# Patient Record
Sex: FEMALE | Race: BLACK OR AFRICAN AMERICAN | NOT HISPANIC OR LATINO | Employment: OTHER | URBAN - METROPOLITAN AREA
[De-identification: names, ages, dates, MRNs, and addresses within clinical notes are randomized per-mention and may not be internally consistent; named-entity substitution may affect disease eponyms.]

---

## 2017-07-25 ENCOUNTER — OFFICE VISIT (OUTPATIENT)
Dept: OPHTHALMOLOGY | Facility: CLINIC | Age: 77
End: 2017-07-25
Payer: MEDICARE

## 2017-07-25 DIAGNOSIS — H44.521: ICD-10-CM

## 2017-07-25 DIAGNOSIS — E11.3553 STABLE PROLIFERATIVE DIABETIC RETINOPATHY OF BOTH EYES ASSOCIATED WITH TYPE 2 DIABETES MELLITUS: ICD-10-CM

## 2017-07-25 DIAGNOSIS — H10.13 ALLERGIC CONJUNCTIVITIS, BILATERAL: Primary | ICD-10-CM

## 2017-07-25 PROCEDURE — 99999 PR PBB SHADOW E&M-EST. PATIENT-LVL II: CPT | Mod: PBBFAC,,, | Performed by: OPHTHALMOLOGY

## 2017-07-25 PROCEDURE — 92012 INTRM OPH EXAM EST PATIENT: CPT | Mod: S$PBB,,, | Performed by: OPHTHALMOLOGY

## 2017-07-25 PROCEDURE — 99212 OFFICE O/P EST SF 10 MIN: CPT | Mod: PBBFAC,PO | Performed by: OPHTHALMOLOGY

## 2017-07-25 RX ORDER — NEOMYCIN SULFATE, POLYMYXIN B SULFATE AND DEXAMETHASONE 3.5; 10000; 1 MG/ML; [USP'U]/ML; MG/ML
1 SUSPENSION/ DROPS OPHTHALMIC 3 TIMES DAILY
Qty: 5 ML | Refills: 0 | Status: SHIPPED | OUTPATIENT
Start: 2017-07-25 | End: 2017-08-04

## 2017-07-25 NOTE — PROGRESS NOTES
===============================  2017   Stephanie Wilder,   76 y.o. female   Last visit [facility]: :2015   Last visit eye dept. Visit date not found  VA:  Uncorrected distance visual acuity was NLP in the right eye and HM in the left eye.   Not recorded         Not recorded         Not recorded        Chief Complaint   Patient presents with    PPDR     having discharge in both eyes/ mostly in her right eye/  started up 2 weeks ago     Ophthalmic Medications     Artificial Tears and Lubricant Single Agents Start End    polyvinyl alcohol, artificial tears, (LIQUIFILM TEARS) 1.4 % ophthalmic solution      Si drop as needed.    Class: Historical Med         HPI     PPDR    Additional comments: having discharge in both eyes/ mostly in her right   eye/  started up 2 weeks ago           Comments   DM SINCE   PPDR  TRD OS  TRAUMA OD X 50+ YEARS  S/p stk od 3/21/13  Dry eyes  PRP OS 10/30/13  Pain in or around eye, right   Blind hypotensive eye, right       Last edited by Raina Reid on 2017  2:28 PM. 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  od tender - red  rec ou mtx tid x 10 days   od phthisical  Os - trd   pcoil   MR /os -.75 +1.50x180 ni   ===========================

## 2018-05-03 ENCOUNTER — TELEPHONE (OUTPATIENT)
Dept: OPHTHALMOLOGY | Facility: CLINIC | Age: 78
End: 2018-05-03

## 2018-05-03 RX ORDER — NEOMYCIN SULFATE, POLYMYXIN B SULFATE AND DEXAMETHASONE 3.5; 10000; 1 MG/ML; [USP'U]/ML; MG/ML
1 SUSPENSION/ DROPS OPHTHALMIC 3 TIMES DAILY
Qty: 5 ML | Refills: 0 | Status: SHIPPED | OUTPATIENT
Start: 2018-05-03 | End: 2018-05-13

## 2018-05-03 RX ORDER — NEOMYCIN SULFATE, POLYMYXIN B SULFATE AND DEXAMETHASONE 3.5; 10000; 1 MG/ML; [USP'U]/ML; MG/ML
1 SUSPENSION/ DROPS OPHTHALMIC 3 TIMES DAILY
Qty: 5 ML | Refills: 0 | Status: CANCELLED | OUTPATIENT
Start: 2018-05-03 | End: 2018-05-13

## 2018-05-03 RX ORDER — NEOMYCIN SULFATE, POLYMYXIN B SULFATE AND DEXAMETHASONE 3.5; 10000; 1 MG/ML; [USP'U]/ML; MG/ML
1 SUSPENSION/ DROPS OPHTHALMIC 3 TIMES DAILY
Qty: 5 ML | Refills: 0 | Status: SHIPPED | OUTPATIENT
Start: 2018-05-03 | End: 2018-05-03 | Stop reason: SDUPTHER

## 2018-05-03 NOTE — TELEPHONE ENCOUNTER
----- Message from Caridad Bustos sent at 5/3/2018  2:39 PM CDT -----  Contact: Patient  Patient needs to speak to nurse about her dry itchy eye, please call her [phone]. Thank  you      I spoke with Ms. Wilder.  Dr. Agustin will refill maxitrol for her.  I will fax it to her nurse at [phone]

## 2018-05-08 ENCOUNTER — TELEPHONE (OUTPATIENT)
Dept: OPHTHALMOLOGY | Facility: CLINIC | Age: 78
End: 2018-05-08

## 2018-06-29 ENCOUNTER — TELEPHONE (OUTPATIENT)
Dept: CARDIOLOGY | Facility: CLINIC | Age: 78
End: 2018-06-29

## 2018-06-29 ENCOUNTER — OFFICE VISIT (OUTPATIENT)
Dept: CARDIOLOGY | Facility: CLINIC | Age: 78
End: 2018-06-29
Payer: MEDICARE

## 2018-06-29 VITALS
SYSTOLIC BLOOD PRESSURE: 154 MMHG | WEIGHT: 154 LBS | DIASTOLIC BLOOD PRESSURE: 46 MMHG | HEART RATE: 72 BPM | HEIGHT: 66 IN | BODY MASS INDEX: 24.75 KG/M2

## 2018-06-29 DIAGNOSIS — M79.3 LIPODERMATOSCLEROSIS: ICD-10-CM

## 2018-06-29 DIAGNOSIS — I87.2 VENOUS INSUFFICIENCY OF BOTH LOWER EXTREMITIES: Primary | ICD-10-CM

## 2018-06-29 DIAGNOSIS — I95.3 HEMODIALYSIS-ASSOCIATED HYPOTENSION: ICD-10-CM

## 2018-06-29 DIAGNOSIS — R60.0 BILATERAL LEG EDEMA: ICD-10-CM

## 2018-06-29 PROCEDURE — 99204 OFFICE O/P NEW MOD 45 MIN: CPT | Mod: S$PBB,,, | Performed by: INTERNAL MEDICINE

## 2018-06-29 PROCEDURE — 99999 PR PBB SHADOW E&M-EST. PATIENT-LVL V: CPT | Mod: PBBFAC,,, | Performed by: INTERNAL MEDICINE

## 2018-06-29 PROCEDURE — 99215 OFFICE O/P EST HI 40 MIN: CPT | Mod: PBBFAC,PO | Performed by: INTERNAL MEDICINE

## 2018-06-29 RX ORDER — CARVEDILOL 25 MG/1
25 TABLET ORAL 2 TIMES DAILY WITH MEALS
COMMUNITY
End: 2020-06-16 | Stop reason: SDUPTHER

## 2018-06-29 RX ORDER — ACETAMINOPHEN 325 MG/1
650 TABLET ORAL EVERY 8 HOURS
Status: ON HOLD | COMMUNITY
End: 2022-03-18 | Stop reason: HOSPADM

## 2018-06-29 RX ORDER — GABAPENTIN 100 MG/1
100 CAPSULE ORAL NIGHTLY
COMMUNITY

## 2018-06-29 RX ORDER — LISINOPRIL 40 MG/1
40 TABLET ORAL DAILY
COMMUNITY
End: 2022-03-15

## 2018-06-29 RX ORDER — AMLODIPINE BESYLATE 10 MG/1
10 TABLET ORAL DAILY
COMMUNITY
End: 2020-06-16 | Stop reason: SDUPTHER

## 2018-06-29 RX ORDER — HYDROCORTISONE 1 %
CREAM (GRAM) TOPICAL 2 TIMES DAILY
COMMUNITY
End: 2022-03-15 | Stop reason: ALTCHOICE

## 2018-06-29 RX ORDER — ETHYL CHLORIDE 100 %
AEROSOL, SPRAY (ML) TOPICAL
COMMUNITY

## 2018-06-29 RX ORDER — TRAMADOL HYDROCHLORIDE 50 MG/1
50 TABLET ORAL EVERY 12 HOURS PRN
Status: ON HOLD | COMMUNITY
End: 2022-03-18 | Stop reason: SDUPTHER

## 2018-06-29 RX ORDER — LOPERAMIDE HYDROCHLORIDE 2 MG/1
2 CAPSULE ORAL 4 TIMES DAILY PRN
COMMUNITY
End: 2022-03-15

## 2018-06-29 RX ORDER — HYDRALAZINE HYDROCHLORIDE 50 MG/1
50 TABLET, FILM COATED ORAL 3 TIMES DAILY
COMMUNITY
End: 2019-07-11 | Stop reason: SDUPTHER

## 2018-06-29 RX ORDER — NEOMYCIN SULFATE, POLYMYXIN B SULFATE, AND DEXAMETHASONE 3.5; 10000; 1 MG/G; [USP'U]/G; MG/G
OINTMENT OPHTHALMIC EVERY 6 HOURS
COMMUNITY
End: 2022-03-15 | Stop reason: ALTCHOICE

## 2018-06-29 RX ORDER — CETIRIZINE HYDROCHLORIDE 10 MG/1
10 TABLET ORAL DAILY
COMMUNITY

## 2018-06-29 RX ORDER — MIRTAZAPINE 15 MG/1
15 TABLET, ORALLY DISINTEGRATING ORAL NIGHTLY
COMMUNITY

## 2018-06-29 NOTE — LETTER
June 29, 2018      Michael Cervantes MD  [address] [place] [address]           [facility]  [address] [place] [address]  Phone: [phone]  Fax: [phone]          Patient: Stephanie Wilder   MR Number: [id number]   YOB: 1941   Date of Visit: 6/29/2018       Dear Dr. Michael Cervantes:    Thank you for referring Stephanie Wilder to me for evaluation. Attached you will find relevant portions of my assessment and plan of care.    If you have questions, please do not hesitate to call me. I look forward to following Stephanie Wilder along with you.    Sincerely,    Nigel Hector MD    Enclosure  CC:  No Recipients    If you would like to receive this communication electronically, please contact [email or web address] or [phone] to request more information on [employer] access.    For providers and/or their staff who would like to refer a patient to [facility], please contact us through our one-stop-shop provider referral line, [facility] , at [phone].    If you feel you have received this communication in error or would no longer like to receive these types of communications, please e-mail [email or web address]

## 2018-06-29 NOTE — PROGRESS NOTES
Subjective:   Patient ID:  Stephanie Wilder is a 77 y.o. female who presents for cardiac consult of Leg Swelling      HPI  The patient came in today for cardiac consult of Leg Swelling    Referring Provider: Michael Cervantes  Reason for consult: Leg swelling    6/29/18  This is a 77 year old female pt with PMHx HTN, ESRD on HD T,TH, Sat, DM, neuropathy, anemia, dementia, gerd presents for initial CV evaluation of LE edema.     Pt has been having presyncopal feelings during HD. She has been having LE edema x 2 months, gradually getting worse. No SOB, but cannot lay flat, somewhat describes orthopnea.   No chest pain. No palpitations. Has been on HD for 13 years. Can walk to bathroom and back. Uses wheelchair, can't walk well overall due to weakness in legs. PT has syncopal episode 12 years ago since episode she couldn't walk, no stroke or other etiology found for lack of walking.     Patient feels no chest pain, no sob,  no PND, no palpitation, no syncope, no CNS symptoms.    Patient is compliant with medications.    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       Past Surgical History:   Procedure Laterality Date    CATARACT EXTRACTION      MASTECTOMY Right        Social History   Substance Use Topics    Smoking status: Never Smoker    Smokeless tobacco: Never Used    Alcohol use No       Family History   Problem Relation Age of Onset    Diabetes Maternal Aunt     Glaucoma Maternal Aunt     Heart attack Maternal Aunt 80    Diabetes Paternal Aunt     Glaucoma Paternal Aunt     Diabetes Child     Heart attack Brother 79       Patient's Medications   New Prescriptions    No medications on file    Previous Medications    ACETAMINOPHEN (TYLENOL) 325 MG TABLET    Take 325 mg by mouth every 6 (six) hours as needed for Pain.    AMLODIPINE (NORVASC) 10 MG TABLET    Take 10 mg by mouth once daily.    CARVEDILOL (COREG) 25 MG TABLET    Take 25 mg by mouth 2 (two) times daily with meals.    CETIRIZINE (ZYRTEC) 10 MG TABLET    Take 10 mg by mouth once daily.    CLOTRIMAZOLE-BETAMETHASONE (LOTRISONE) CREAM        CRANBERRY FRUIT EXTRACT (CRANBERRY ORAL)    Take by mouth.    DICYCLOMINE (BENTYL) 10 MG CAPSULE    Take 10 mg by mouth 4 (four) times daily before meals and nightly.    DOCUSATE SODIUM (COLACE) 100 MG CAPSULE    Take 100 mg by mouth 2 (two) times daily.    ERYTHROMYCIN (ROMYCIN) OPHTHALMIC OINTMENT    Place into the left eye every evening.    ESCITALOPRAM OXALATE (LEXAPRO) 10 MG TABLET    Take 1 tablet (10 mg total) by mouth once daily. 1 Tablet Oral Every day    ETHYL CHLORIDE 100% 100 % SPRAY    Apply topically as needed.    FERROUS SULFATE 325 MG (65 MG IRON) TAB TABLET    Take 325 mg by mouth daily with breakfast.    GABAPENTIN (NEURONTIN) 100 MG CAPSULE    Take 100 mg by mouth 3 (three) times daily.    GUAIFENESIN (MUCINEX) 600 MG 12 HR TABLET    Take 1,200 mg by mouth 2 (two) times daily.    HYDRALAZINE (APRESOLINE) 50 MG TABLET    Take 50 mg by mouth 3 (three) times daily.    HYDROCORTISONE (PREPARATION H HYDROCORTISONE) 1 % CREAM    Apply topically 2 (two) times daily.    INSULIN ASP PRT-INSULIN ASPART (NOVOLOG MIX 70-30) 100 UNIT/ML (70-30) SOLN    Sliding scale    INSULIN DETEMIR (LEVEMIR) 100 UNIT/ML INJECTION    Inject 12 Units into the skin once daily.    LISINOPRIL (PRINIVIL,ZESTRIL) 40 MG TABLET    Take 40 mg by mouth once daily.    LOPERAMIDE (IMODIUM) 2 MG CAPSULE    Take 2 mg by mouth 4 (four) times daily as needed for Diarrhea.    MIDODRINE (PROAMATINE) 5 MG TAB    Take 2.5 mg by mouth 2 (two) times daily with meals.    MIRTAZAPINE (REMERON) 30 MG TABLET    Take 30 mg by mouth every  evening.    NEOMYCIN-POLYMYXIN-DEXAMETHASONE (DEXACINE) 3.5 MG/G-10,000 UNIT/G-0.1 % OINT    every 6 (six) hours.    OLANZAPINE (ZYPREXA) 5 MG TABLET    Take 5 mg by mouth every evening.    ONDANSETRON (ZOFRAN) 4 MG TABLET    Take 4 mg by mouth every 8 (eight) hours as needed for Nausea.    PANTOPRAZOLE (PROTONIX) 40 MG TABLET    Take 1 tablet (40 mg total) by mouth 2 (two) times daily.    -PROPYLENE GLYCOL, PF, (SYSTANE ULTRA, PF,) 0.4-0.3 % DPET    Apply to eye.    POLYETHYLENE GLYCOL (GLYCOLAX) 17 GRAM PWPK    Take by mouth.    SEVELAMER CARBONATE (RENVELA) 800 MG TAB    Take 800 mg by mouth 3 (three) times daily with meals.    TRAMADOL (ULTRAM) 50 MG TABLET    Take 50 mg by mouth every 6 (six) hours as needed for Pain.   Modified Medications    No medications on file   Discontinued Medications    B CMPLX 4-VIT A0-F-RO-ZINC OX (VITAL-D RX) 1,750-60-1-12.5 UNIT-MG-MG-MG TAB    Take by mouth. 1 Tablet Oral Every day    CINACALCET (SENSIPAR) 30 MG TAB    Take 30 mg by mouth daily with breakfast.    LACTULOSE (CHRONULAC) 10 GRAM/15 ML SOLUTION    Take by mouth daily as needed.    LORATADINE (CLARITIN) 10 MG TABLET    Take 10 mg by mouth once daily.    POLYVINYL ALCOHOL, ARTIFICIAL TEARS, (LIQUIFILM TEARS) 1.4 % OPHTHALMIC SOLUTION    1 drop as needed.    TRAMADOL-ACETAMINOPHEN 37.5-325 MG (ULTRACET) 37.5-325 MG TAB    Take 1 tablet by mouth every 6 (six) hours as needed for Pain. 2 Tablet Oral Every 6 hours prn       Review of Systems   Constitutional: Negative.    HENT: Negative.    Eyes: Negative.    Respiratory: Negative.    Cardiovascular: Positive for leg swelling.   Gastrointestinal: Negative.    Genitourinary: Negative.    Musculoskeletal: Negative.    Skin: Negative.    Neurological: Positive for focal weakness.   Endo/Heme/Allergies: Negative.    Psychiatric/Behavioral: Negative.    All 12 systems otherwise negative.      Wt Readings from Last 3 Encounters:   06/29/18 69.9 kg (154 lb)   06/22/16 69.9  "kg (154 lb)   04/13/16 68.5 kg (151 lb)     Temp Readings from Last 3 Encounters:   06/22/16 98.6 °F (37 °C) (Oral)   04/11/16 98.9 °F (37.2 °C) (Oral)   03/04/16 98.9 °F (37.2 °C) (Axillary)     BP Readings from Last 3 Encounters:   06/29/18 (!) 154/46   06/22/16 (!) 160/80   04/13/16 122/60     Pulse Readings from Last 3 Encounters:   06/29/18 72   06/22/16 76   04/13/16 85       BP (!) 154/46   Pulse 72   Ht 5' 6" (1.676 m)   Wt 69.9 kg (154 lb)   BMI 24.86 kg/m²     Objective:   Physical Exam   Constitutional: She is oriented to person, place, and time. She appears well-developed and well-nourished. No distress.   HENT:   Head: Normocephalic and atraumatic.   Nose: Nose normal.   Mouth/Throat: Oropharynx is clear and moist.   Eyes: Conjunctivae and EOM are normal. No scleral icterus.   Neck: Normal range of motion. Neck supple. No JVD present. No thyromegaly present.   Cardiovascular: Normal rate, regular rhythm, S1 normal and S2 normal.  Exam reveals no gallop, no S3, no S4 and no friction rub.    Murmur heard.   Midsystolic murmur is present with a grade of 2/6  at the upper right sternal border  Pulmonary/Chest: Effort normal and breath sounds normal. No stridor. No respiratory distress. She has no wheezes. She has no rales. She exhibits no tenderness.   Abdominal: Soft. Bowel sounds are normal. She exhibits no distension and no mass. There is no tenderness. There is no rebound.   Genitourinary:   Genitourinary Comments: Deferred   Musculoskeletal: Normal range of motion. She exhibits edema (2+ b/l). She exhibits no tenderness or deformity.   Lymphadenopathy:     She has no cervical adenopathy.   Neurological: She is alert and oriented to person, place, and time. She exhibits normal muscle tone. Coordination normal.   Skin: Skin is warm and dry. Rash (lipodermatosclerosis on B/L LE) noted. She is not diaphoretic. No erythema. No pallor.   Psychiatric: She has a normal mood and affect. Her behavior is " normal. Judgment and thought content normal.   Nursing note and vitals reviewed.      Lab Results   Component Value Date     04/13/2016    K 3.9 04/13/2016     04/13/2016    CO2 30 (H) 04/13/2016    BUN 31 (H) 04/13/2016    CREATININE 5.5 (H) 04/13/2016     (H) 04/13/2016    HGBA1C 6.2 02/27/2016    MG 2.0 02/27/2016    AST 10 04/13/2016    ALT 11 04/13/2016    ALBUMIN 3.1 (L) 04/13/2016    PROT 7.2 04/13/2016    BILITOT 0.3 04/13/2016    WBC 6.81 04/13/2016    HGB 10.7 (L) 04/13/2016    HCT 33.5 (L) 04/13/2016    HCT 38 02/26/2016     (H) 04/13/2016     04/13/2016    INR 1.1 02/26/2016    TSH <0.010 (L) 04/13/2016     (H) 02/29/2016     Assessment:      1. Venous insufficiency of both lower extremities    2. Hemodialysis-associated hypotension    3. Bilateral leg edema    4. Lipodermatosclerosis        Plan:   1. LE edema   - neg LE u/s for DVTs at nursing home per pt  - likely due to chronic venous insufficiency  - rec compression stockings daily  - check 2D echo to r/o valvular disease and CHF  - low salt diet  - if swelling does not resolve will need further workup to evaluate for lymphedema or deep venous obstruction     2. HTN/hypotension with HD  - cont meds per nephrology    3. ESRD  - cont HD  - compression stockings will increase venous return to allow more volume removal with HD    Thank you for allowing me to participate in this patient's care. Please do not hesitate to contact me with any questions or concerns. Consult note has been forwarded to the referral physician.

## 2018-06-29 NOTE — TELEPHONE ENCOUNTER
Spoke with Jodee at NH and advised her that no antibiotics were prescribed by Dr. Hector.  Confirmed this with Dr. Hector.    ----- Message from Marita Alves sent at 6/29/2018 12:20 PM CDT -----  [facility]   Calling concerning the patient paperwork states she is prescribed antibiotics but does not see any. Please call Federica PINEDA today @ [phone]. Thanks, shanthi

## 2018-07-12 ENCOUNTER — CLINICAL SUPPORT (OUTPATIENT)
Dept: CARDIOLOGY | Facility: CLINIC | Age: 78
End: 2018-07-12
Attending: INTERNAL MEDICINE
Payer: MEDICARE

## 2018-07-12 DIAGNOSIS — R60.0 BILATERAL LEG EDEMA: ICD-10-CM

## 2018-07-12 DIAGNOSIS — I87.2 VENOUS INSUFFICIENCY OF BOTH LOWER EXTREMITIES: ICD-10-CM

## 2018-07-12 PROCEDURE — 93306 TTE W/DOPPLER COMPLETE: CPT | Mod: PBBFAC,PO | Performed by: INTERNAL MEDICINE

## 2018-07-13 ENCOUNTER — TELEPHONE (OUTPATIENT)
Dept: CARDIOLOGY | Facility: CLINIC | Age: 78
End: 2018-07-13

## 2018-07-13 LAB
ESTIMATED PA SYSTOLIC PRESSURE: 44.99
RETIRED EF AND QEF - SEE NOTES: 60 (ref 55–65)
TRICUSPID VALVE REGURGITATION: ABNORMAL

## 2018-07-13 NOTE — TELEPHONE ENCOUNTER
----- Message from Nigel Hector MD sent at 7/13/2018  2:25 PM CDT -----  Please call patient regarding normal result of overall heart function with mild valve disease; elevated pressures in right heart which we will monitor and need further workup with pulmonary. If he/she has any questions please let me know or have him/her schedule an appt to see me soon to discuss. Thank you

## 2018-07-13 NOTE — TELEPHONE ENCOUNTER
I called and pt is in a nursing facility. I rev'd her results with her nurse, Federica Nieves LPN. I also gave her the pt's follow up visit date, time and location. If pt needs to be seen sooner please call us back. She agreed .carmen

## 2018-07-20 ENCOUNTER — TELEPHONE (OUTPATIENT)
Dept: CARDIOLOGY | Facility: CLINIC | Age: 78
End: 2018-07-20

## 2018-07-20 NOTE — TELEPHONE ENCOUNTER
Returned call to Federica--spoke with Idalia--states Federica is unavailable-- will give Federica message to return call to [phone].

## 2018-07-20 NOTE — TELEPHONE ENCOUNTER
----- Message from Jean Bryant sent at 7/20/2018  2:53 PM CDT -----  Contact: elder joyner from [facility]   States she's calling rg her US results and the nurse mentioned a Pulm work-up and wanted to follow up on that and can be reached at [phone]//thanks/dbw

## 2018-07-25 ENCOUNTER — TELEPHONE (OUTPATIENT)
Dept: CARDIOLOGY | Facility: CLINIC | Age: 78
End: 2018-07-25

## 2018-07-25 NOTE — TELEPHONE ENCOUNTER
Spoke with Federica--states received results of patient's echo and was told patient needs to be followed up by a pulmonologist--would like to schedule an appointment--appointment has been scheduled--Federica has been made aware of all appointment information

## 2018-07-25 NOTE — TELEPHONE ENCOUNTER
----- Message from Fela Proctor sent at 7/25/2018  9:02 AM CDT -----  Contact: Stephanie Schultz of [place]  She's calling to verify if a referral will be sent for a Pulmonologist, please advise [phone]

## 2018-08-02 ENCOUNTER — LAB VISIT (OUTPATIENT)
Dept: LAB | Facility: HOSPITAL | Age: 78
End: 2018-08-02
Attending: INTERNAL MEDICINE
Payer: MEDICARE

## 2018-08-02 ENCOUNTER — OFFICE VISIT (OUTPATIENT)
Dept: PULMONOLOGY | Facility: CLINIC | Age: 78
End: 2018-08-02
Payer: MEDICARE

## 2018-08-02 VITALS
RESPIRATION RATE: 18 BRPM | OXYGEN SATURATION: 92 % | WEIGHT: 160 LBS | SYSTOLIC BLOOD PRESSURE: 130 MMHG | BODY MASS INDEX: 25.71 KG/M2 | HEART RATE: 69 BPM | DIASTOLIC BLOOD PRESSURE: 60 MMHG | HEIGHT: 66 IN

## 2018-08-02 DIAGNOSIS — I27.29 PULMONARY HYPERTENSION ASSOCIATED WITH END-STAGE RENAL DISEASE (ESRD) ON DIALYSIS: Primary | ICD-10-CM

## 2018-08-02 DIAGNOSIS — I27.29 PULMONARY HYPERTENSION ASSOCIATED WITH END-STAGE RENAL DISEASE (ESRD) ON DIALYSIS: ICD-10-CM

## 2018-08-02 DIAGNOSIS — N18.6 PULMONARY HYPERTENSION ASSOCIATED WITH END-STAGE RENAL DISEASE (ESRD) ON DIALYSIS: Primary | ICD-10-CM

## 2018-08-02 DIAGNOSIS — Z99.2 PULMONARY HYPERTENSION ASSOCIATED WITH END-STAGE RENAL DISEASE (ESRD) ON DIALYSIS: ICD-10-CM

## 2018-08-02 DIAGNOSIS — N18.6 PULMONARY HYPERTENSION ASSOCIATED WITH END-STAGE RENAL DISEASE (ESRD) ON DIALYSIS: ICD-10-CM

## 2018-08-02 DIAGNOSIS — Z99.2 PULMONARY HYPERTENSION ASSOCIATED WITH END-STAGE RENAL DISEASE (ESRD) ON DIALYSIS: Primary | ICD-10-CM

## 2018-08-02 LAB
ALBUMIN SERPL BCP-MCNC: 3.2 G/DL
ALP SERPL-CCNC: 107 U/L
ALT SERPL W/O P-5'-P-CCNC: 9 U/L
ANION GAP SERPL CALC-SCNC: 7 MMOL/L
AST SERPL-CCNC: 11 U/L
BASOPHILS # BLD AUTO: 0.01 K/UL
BASOPHILS NFR BLD: 0.2 %
BILIRUB SERPL-MCNC: 0.5 MG/DL
BNP SERPL-MCNC: 650 PG/ML
BUN SERPL-MCNC: 32 MG/DL
CALCIUM SERPL-MCNC: 9 MG/DL
CHLORIDE SERPL-SCNC: 102 MMOL/L
CO2 SERPL-SCNC: 28 MMOL/L
CREAT SERPL-MCNC: 4.5 MG/DL
CRP SERPL-MCNC: 86.8 MG/L
DIFFERENTIAL METHOD: ABNORMAL
EOSINOPHIL # BLD AUTO: 0.2 K/UL
EOSINOPHIL NFR BLD: 4.9 %
ERYTHROCYTE [DISTWIDTH] IN BLOOD BY AUTOMATED COUNT: 16.3 %
ERYTHROCYTE [SEDIMENTATION RATE] IN BLOOD BY WESTERGREN METHOD: 44 MM/HR
EST. GFR  (AFRICAN AMERICAN): 10.3 ML/MIN/1.73 M^2
EST. GFR  (NON AFRICAN AMERICAN): 9 ML/MIN/1.73 M^2
GLUCOSE SERPL-MCNC: 131 MG/DL
HCT VFR BLD AUTO: 34.7 %
HGB BLD-MCNC: 10.2 G/DL
IMM GRANULOCYTES # BLD AUTO: 0.01 K/UL
IMM GRANULOCYTES NFR BLD AUTO: 0.2 %
LYMPHOCYTES # BLD AUTO: 0.8 K/UL
LYMPHOCYTES NFR BLD: 18.8 %
MCH RBC QN AUTO: 31.9 PG
MCHC RBC AUTO-ENTMCNC: 29.4 G/DL
MCV RBC AUTO: 108 FL
MONOCYTES # BLD AUTO: 0.6 K/UL
MONOCYTES NFR BLD: 13.5 %
NEUTROPHILS # BLD AUTO: 2.8 K/UL
NEUTROPHILS NFR BLD: 62.4 %
NRBC BLD-RTO: 0 /100 WBC
PLATELET # BLD AUTO: 132 K/UL
PMV BLD AUTO: 10 FL
POTASSIUM SERPL-SCNC: 4.2 MMOL/L
PROT SERPL-MCNC: 7.6 G/DL
RBC # BLD AUTO: 3.2 M/UL
SODIUM SERPL-SCNC: 137 MMOL/L
WBC # BLD AUTO: 4.46 K/UL

## 2018-08-02 PROCEDURE — 99213 OFFICE O/P EST LOW 20 MIN: CPT | Mod: PBBFAC | Performed by: INTERNAL MEDICINE

## 2018-08-02 PROCEDURE — 86038 ANTINUCLEAR ANTIBODIES: CPT

## 2018-08-02 PROCEDURE — 99205 OFFICE O/P NEW HI 60 MIN: CPT | Mod: S$PBB,,, | Performed by: INTERNAL MEDICINE

## 2018-08-02 PROCEDURE — 99999 PR PBB SHADOW E&M-EST. PATIENT-LVL III: CPT | Mod: PBBFAC,,, | Performed by: INTERNAL MEDICINE

## 2018-08-02 PROCEDURE — 36415 COLL VENOUS BLD VENIPUNCTURE: CPT

## 2018-08-02 PROCEDURE — 80053 COMPREHEN METABOLIC PANEL: CPT

## 2018-08-02 PROCEDURE — 86255 FLUORESCENT ANTIBODY SCREEN: CPT | Mod: 91

## 2018-08-02 PROCEDURE — 86140 C-REACTIVE PROTEIN: CPT

## 2018-08-02 PROCEDURE — 85651 RBC SED RATE NONAUTOMATED: CPT

## 2018-08-02 PROCEDURE — 83880 ASSAY OF NATRIURETIC PEPTIDE: CPT

## 2018-08-02 PROCEDURE — 85025 COMPLETE CBC W/AUTO DIFF WBC: CPT

## 2018-08-02 NOTE — ASSESSMENT & PLAN NOTE
Results for orders placed or performed in visit on 07/12/18   2D echo with color flow doppler   Result Value Ref Range    EF 60 55 - 65    Est. PA Systolic Pressure 44.99 (A)     Tricuspid Valve Regurgitation MILD      mPAP = 0.61 44.99 (sPAP)  + 2 = 29.45 mmhg    The etiopathogenic mechanisms that have been studied in relatively small studies:    1. Arteriovenous fistula-induced increased cardiac output.   2. Endothelial dysfunction, dysregulation of vascular tone and systemic inflammation.   3. Vasoconstriction and vascular sclerosis due to microbubbles in the dialysis circuit.     I have discussed etiology, clinical course and history, diagnostic evaluation, treatment, complications associated with treatment of pulmonary  hypertension in detail with the patient.  The patient expressed and verbalized understanding.  I have also discussed complications of untreated pulmonary hypertension with the patient who expressed and verbalized understanding.  All questions answered.      FURTHER EVALUATION:  [x]        Complete PFT with bronchodilator.  [x]        V/Q scan.   []        Stress test, pulmonary.  [x]        PULM - Arterial Blood Gases  []        Chest X Ray  [x]        CT scan of chest.   [x]        RANJIT  [x]        Sedimentation rate  [x]        C-reactive protein  [x]        Anti-neutrophilic cytoplasmic antibody  [x]        NT Pro BNP         PLAN:  Re evaluate in four weeks with results.  Continue all current ongoing medical management.

## 2018-08-02 NOTE — PROGRESS NOTES
New patient    Subjective:      Patient ID: Stephanie Wilder is a 77 y.o. female.    Patient Active Problem List   Diagnosis    Diabetes mellitus    Dryness, eye - Both Eyes    Blind hypotensive eye - Right Eye    Tear film insufficiency, unspecified    Numbness of face    Colon cancer screening    Pain in or around eye    End stage renal disease    Hypoglycemia    DM (diabetes mellitus)    Type 2 diabetes mellitus with proliferative diabetic retinopathy without macular edema    Retinal ischemia    Traction detachment of left retina    Upper GI bleed    Hemodialysis-associated hypotension    Bilateral leg edema    Venous insufficiency of both lower extremities    Lipodermatosclerosis    Pulmonary hypertension associated with end-stage renal disease (ESRD) on dialysis       Problem list has been reviewed.    she has been referred by Nigel Hector MD for evaluation and management for   Chief Complaint   Patient presents with    Pulmonary Hypertension       Chief Complaint: Pulmonary Hypertension      HPI:      She has ESRD on HD( for the past 13 years) ; Mon. Wed, Fri @ [facility] on [address]. She has a shunt in her Right Femoral A/V shunt    She reports HD associated hypotension for the past 8 months. She reports recurrent syncope during HD. She also reports progressive pedal edema and orthopnea. She denies shortness of breath. She is non ambulatory due to a possible CVA. She is wheelchair bound.     2D ECHO revealed mild pulmonary HTN. She is her for evaluation for pulmonary HTN    Patient denies cough, difficulty breathing and wheezing. Patient denies recent sick contacts.   Patient does not have new pets. Patient does not have a history of asthma. Patient does not have a history of environmental allergens. { Patient has not traveled recently. Patient does not have a history of smoking. Patient has had a previous chest x-ray.     She reports that she was treated for TB in the past (She is not very  sure)       Previous Report Reviewed: lab reports, office notes and radiology reports     Past Medical History: The following portions of the patient's history were reviewed and updated as appropriate:   She  has a past surgical history that includes Cataract extraction and Mastectomy (Right).  Her family history includes Diabetes in her child, maternal aunt, and paternal aunt; Glaucoma in her maternal aunt and paternal aunt; Heart attack (age of onset: 79) in her brother; Heart attack (age of onset: 80) in her maternal aunt.  She  reports that she has never smoked. She has never used smokeless tobacco. She reports that she does not drink alcohol or use drugs.  She has a current medication list which includes the following prescription(s): acetaminophen, amlodipine, carvedilol, cetirizine, clotrimazole-betamethasone 1-0.05%, cranberry fruit extract, dicyclomine, docusate sodium, erythromycin, escitalopram oxalate, ethyl chloride 100%, ferrous sulfate, gabapentin, guaifenesin, hydralazine, hydrocortisone, insulin asp prt-insulin aspart (novolog 70/30), insulin detemir u-100, lisinopril, loperamide, midodrine, mirtazapine, neomycin-polymyxin-dexamethasone, olanzapine, ondansetron, peg 400-propylene glycol (pf), polyethylene glycol, sevelamer carbonate, tramadol, and pantoprazole.  She has No Known Allergies..    Review of Systems   Constitutional: Positive for fatigue and weakness. Negative for chills and night sweats.   HENT: Negative for nosebleeds, sinus pressure and congestion.         Dentures   Eyes: Negative for itching.        Cataracts   Glaucoma.   Respiratory: Positive for orthopnea. Negative for snoring, cough, wheezing, dyspnea on extertion and Paroxysmal Nocturnal Dyspnea.    Cardiovascular: Positive for leg swelling.   Genitourinary: Positive for difficulty urinating.   Endocrine: Negative for cold intolerance and heat intolerance.    Musculoskeletal: Positive for arthralgias and back pain.   Skin:  "Negative for rash.   Gastrointestinal: Positive for acid reflux. Negative for abdominal pain.        Constipation.   Hematochezia   Neurological: Negative for syncope, light-headedness and headaches.   Hematological: Excessive bruising.   All other systems reviewed and are negative.         Occupational History:    Retired CNA  denies occupational exposure to asbestos, silica and petrochemicals.    Avocational Exposures:  none    Pet Exposures:  none      Objective:     Vitals:    08/02/18 0920   BP: 130/60   Pulse: 69   Resp: 18   SpO2: (!) 92%   Weight: 72.6 kg (160 lb)   Height: 5' 6" (1.676 m)   PainSc: 0-No pain     Body mass index is 25.82 kg/m².    Physical Exam   Constitutional: She is oriented to person, place, and time. She appears well-developed and well-nourished.   HENT:   Head: Normocephalic.   OS Endopthalmitis   Eyes: EOM are normal. Pupils are equal, round, and reactive to light.   Neck: Normal range of motion. Neck supple.   Cardiovascular: Normal rate and regular rhythm.    Loud P2   Pulmonary/Chest: Effort normal. No respiratory distress. She has no wheezes. She has no rales.   Abdominal: Soft. Bowel sounds are normal.   Musculoskeletal: She exhibits edema.   Bilateral LE stasis dermatitis.    Neurological: She is alert and oriented to person, place, and time.   Skin: Skin is warm and dry.   Psychiatric: She has a normal mood and affect.   Nursing note and vitals reviewed.      Personal Diagnostic Review      Results for orders placed during the hospital encounter of 02/26/16   X-Ray Chest 1 View    Narrative Exam: XR CHEST 1 VIEW,    Date:  03/03/16 05:27:00    History: Pneumonia.    Findings: Comparison with March 1 exam.    Left axillary stent and clips are present.  Right chest walls clips are present.    Prominent soft tissue at the left paratracheal location with mass effect probably enlarged thyroid gland, unchanged    Cardiomegaly.    Bilateral suprahilar band like infiltrates, slightly " improved on left.    Impression   Please see above.      Electronically signed by: ARACELIS SANTIAGO MD  Date:     03/03/16  Time:    08:14        Results for orders placed or performed in visit on 07/12/18   2D echo with color flow doppler   Result Value Ref Range    EF 60 55 - 65    Est. PA Systolic Pressure 44.99 (A)     Tricuspid Valve Regurgitation MILD        Assessment /Plan:     Discussed diagnosis, its evaluation, treatment and usual course. All questions answered.    Problem List Items Addressed This Visit        Pulmonary    Pulmonary hypertension associated with end-stage renal disease (ESRD) on dialysis - Primary    Current Assessment & Plan       Results for orders placed or performed in visit on 07/12/18   2D echo with color flow doppler   Result Value Ref Range    EF 60 55 - 65    Est. PA Systolic Pressure 44.99 (A)     Tricuspid Valve Regurgitation MILD      mPAP = 0.61 44.99 (sPAP)  + 2 = 29.45 mmhg    The etiopathogenic mechanisms that have been studied in relatively small studies:    1. Arteriovenous fistula-induced increased cardiac output.   2. Endothelial dysfunction, dysregulation of vascular tone and systemic inflammation.   3. Vasoconstriction and vascular sclerosis due to microbubbles in the dialysis circuit.     I have discussed etiology, clinical course and history, diagnostic evaluation, treatment, complications associated with treatment of pulmonary  hypertension in detail with the patient.  The patient expressed and verbalized understanding.  I have also discussed complications of untreated pulmonary hypertension with the patient who expressed and verbalized understanding.  All questions answered.      FURTHER EVALUATION:  [x]        Complete PFT with bronchodilator.  [x]        V/Q scan.   []        Stress test, pulmonary.  [x]        PULM - Arterial Blood Gases  []        Chest X Ray  [x]        CT scan of chest.   [x]        RANJIT  [x]        Sedimentation rate  [x]        C-reactive  protein  [x]        Anti-neutrophilic cytoplasmic antibody  [x]        NT Pro BNP         PLAN:  Re evaluate in four weeks with results.  Continue all current ongoing medical management.            Relevant Orders    Complete PFT with bronchodilator    PULM - Arterial Blood Gases--in addition to PFT only    Sedimentation rate    Anti-neutrophilic cytoplasmic antibody    RAJNIT    C-reactive protein    Comprehensive metabolic panel    CBC auto differential    Brain natriuretic peptide    CT Chest Without Contrast    NM Lung Ventilation Perfusion Imaging               TIME SPENT WITH PATIENT: Time spent: 60 minutes in face to face  discussion concerning diagnosis, prognosis, review of lab and test results, benefits of treatment as well as management of disease, counseling of patient and coordination of care between various health  care providers . Greater than half the time spent was used for coordination of care and counseling of patient.     Follow-up in about 1 month (around 9/2/2018) for Pulmonary Hypertension.

## 2018-08-02 NOTE — PATIENT INSTRUCTIONS
Pulmonary Hypertension  Pulmonary hypertension is high pressure in the blood vessels that carry blood into the lungs. This strains the lungs and heart and can lead to serious problems.  Systemic hypertension means the pressure is too high in blood vessels throughout the body. A person with pulmonary hypertension may also have systemic hypertension.   What causes pulmonary hypertension?  The cause of pulmonary hypertension is sometimes unknown. But it is most often caused by another health problem. In many cases, controlling this problem can help prevent or control pulmonary hypertension. Some of the most common causes of pulmonary hypertension are:  In children  · Severe lung problems in a   · Lung conditions, such as cystic fibrosis or interstitial lung disease  · Heart disease  · Congenital heart defects  · HIV infection  · Other conditions, such as scleroderma, lupus, or sickle cell disease  In adults  · Lung conditions, such as chronic obstructive pulmonary disease (COPD), advanced bronchitis, cystic fibrosis, or pulmonary fibrosis  · Liver disease  · Blood clots in the lungs  · Left-sided heart failure  · HIV infection  · Sleep apnea  · Other conditions, such as scleroderma, lupus, or sickle cell disease  What are the symptoms of pulmonary hypertension?  Symptoms may come on suddenly. Or, they may come on slowly over time. Symptoms can include:  · Shortness of breath  · Blue lips or fingernails (signs that the body is having trouble getting oxygen)  · Tiring quickly, especially when active  · Fast heartbeat  · Pain in the upper right side of the abdomen  · Swelling in the legs or ankles  · Chest pain or pressure  · Fainting or dizzy spells  How is pulmonary hypertension diagnosed?  Your healthcare provider will examine you and listen to your heart and lungs. Your blood pressure will also be measured. Tests may be done as well. These may include:  · Blood tests. These measure certain body functions.  They also check for problems such as infection.  · A chest X-ray. This takes a picture of the inside of the chest. It can show certain heart and lung problems.  · An electrocardiogram (ECG). This test records the hearts electrical activity.  · An echocardiogram (echo). This test uses sound waves to create a moving picture of the heart.  · Pulmonary function tests. These tests measure breathing and lung capacity.  · Cardiac catheterization. This procedure gives detailed information about the hearts structures. A thin tube (catheter) is put into a blood vessel in the groin or neck and guided into the right side of the heart. Certain blood pressure tests are then done.  How is pulmonary hypertension treated?   Treatment depends on your age, health, and the severity of your symptoms. Any underlying health problems you have will be treated. Treatment may also include:  · Oxygen  · Medicine to lower the pressure in the lung blood vessels  · Medicine to help the body lose excess water  · Medicine to prevent blood clots  · Medicine that help the heart beat stronger, pump more blood and control abnormal heart rhythms  What are the long-term concerns?  Though pulmonary hypertension has no cure, certain treatments may relieve symptoms and slow progression of the disease. In rare and severe cases, a lung transplant may be needed. Your healthcare provider can tell you more about this if needed.  When you should call your healthcare provider  Call your healthcare provider right away if you have any of the following:  · Persistent blueness of lips or fingernails  · Shortness of breath  · Fever of 100.4°F (38.0°C) or higher  · Fainting spells   Date Last Reviewed: 1/1/2017  © 3795-8299 [employer]. [address] [place] [address], [place] [address]. All rights reserved. This information is not intended as a substitute for professional medical care. Always follow your healthcare professional's instructions.

## 2018-08-02 NOTE — LETTER
August 2, 2018      Nigel Hector MD  [address] [facility] [address]           [facility]  [address] [place] [id number]  Phone: [phone]  Fax: [phone]          Patient: Stephanie Wilder   MR Number: [id number]   YOB: 1941   Date of Visit: 8/2/2018       Dear Dr. Nigel Hector:    Thank you for referring Stephanie Wilder to me for evaluation. Attached you will find relevant portions of my assessment and plan of care.    If you have questions, please do not hesitate to call me. I look forward to following Stephanie Wilder along with you.    Sincerely,    Duran Welsh MD    Enclosure  CC:  No Recipients    If you would like to receive this communication electronically, please contact [email or web address] or [phone] to request more information on [employer] access.    For providers and/or their staff who would like to refer a patient to [facility], please contact us through our one-stop-shop provider referral line, [facility] , at [phone].    If you feel you have received this communication in error or would no longer like to receive these types of communications, please e-mail [email or web address]

## 2018-08-03 LAB — ANA SER QL IF: NORMAL

## 2018-08-06 LAB
ANCA AB TITR SER IF: NORMAL TITER
P-ANCA TITR SER IF: NORMAL TITER

## 2018-08-21 RX ORDER — NEOMYCIN SULFATE, POLYMYXIN B SULFATE AND DEXAMETHASONE 3.5; 10000; 1 MG/ML; [USP'U]/ML; MG/ML
1 SUSPENSION/ DROPS OPHTHALMIC 4 TIMES DAILY
Qty: 5 ML | Refills: 2 | Status: SHIPPED | OUTPATIENT
Start: 2018-08-21 | End: 2018-08-21 | Stop reason: SDUPTHER

## 2018-08-21 RX ORDER — NEOMYCIN SULFATE, POLYMYXIN B SULFATE AND DEXAMETHASONE 3.5; 10000; 1 MG/ML; [USP'U]/ML; MG/ML
1 SUSPENSION/ DROPS OPHTHALMIC 4 TIMES DAILY
Qty: 5 ML | Refills: 2 | Status: SHIPPED | OUTPATIENT
Start: 2018-08-21 | End: 2018-08-23 | Stop reason: SDUPTHER

## 2018-08-23 RX ORDER — NEOMYCIN SULFATE, POLYMYXIN B SULFATE AND DEXAMETHASONE 3.5; 10000; 1 MG/ML; [USP'U]/ML; MG/ML
1 SUSPENSION/ DROPS OPHTHALMIC 4 TIMES DAILY
Qty: 5 ML | Refills: 2 | Status: SHIPPED | OUTPATIENT
Start: 2018-08-23 | End: 2022-03-15 | Stop reason: ALTCHOICE

## 2018-08-23 NOTE — PROGRESS NOTES
Assessment /Plan     For exam results, see Encounter Report.    There are no diagnoses linked to this encounter.

## 2018-08-28 ENCOUNTER — PROCEDURE VISIT (OUTPATIENT)
Dept: PULMONOLOGY | Facility: CLINIC | Age: 78
End: 2018-08-28
Payer: MEDICARE

## 2018-08-28 ENCOUNTER — OFFICE VISIT (OUTPATIENT)
Dept: PULMONOLOGY | Facility: CLINIC | Age: 78
End: 2018-08-28
Payer: MEDICARE

## 2018-08-28 ENCOUNTER — HOSPITAL ENCOUNTER (OUTPATIENT)
Dept: RADIOLOGY | Facility: HOSPITAL | Age: 78
Discharge: HOME | End: 2018-08-28
Attending: INTERNAL MEDICINE
Payer: MEDICARE

## 2018-08-28 VITALS
DIASTOLIC BLOOD PRESSURE: 60 MMHG | HEART RATE: 74 BPM | HEIGHT: 66 IN | BODY MASS INDEX: 23.38 KG/M2 | SYSTOLIC BLOOD PRESSURE: 126 MMHG | RESPIRATION RATE: 18 BRPM | WEIGHT: 145.5 LBS | OXYGEN SATURATION: 98 %

## 2018-08-28 DIAGNOSIS — I27.29 PULMONARY HYPERTENSION ASSOCIATED WITH END-STAGE RENAL DISEASE (ESRD) ON DIALYSIS: ICD-10-CM

## 2018-08-28 DIAGNOSIS — N18.6 PULMONARY HYPERTENSION ASSOCIATED WITH END-STAGE RENAL DISEASE (ESRD) ON DIALYSIS: ICD-10-CM

## 2018-08-28 DIAGNOSIS — Z99.2 PULMONARY HYPERTENSION ASSOCIATED WITH END-STAGE RENAL DISEASE (ESRD) ON DIALYSIS: ICD-10-CM

## 2018-08-28 DIAGNOSIS — Z99.2 PULMONARY HYPERTENSION ASSOCIATED WITH END-STAGE RENAL DISEASE (ESRD) ON DIALYSIS: Chronic | ICD-10-CM

## 2018-08-28 DIAGNOSIS — N18.6 PULMONARY HYPERTENSION ASSOCIATED WITH END-STAGE RENAL DISEASE (ESRD) ON DIALYSIS: Chronic | ICD-10-CM

## 2018-08-28 DIAGNOSIS — J98.4 CRLD (CHRONIC RESTRICTIVE LUNG DISEASE): Chronic | ICD-10-CM

## 2018-08-28 DIAGNOSIS — I27.29 PULMONARY HYPERTENSION ASSOCIATED WITH END-STAGE RENAL DISEASE (ESRD) ON DIALYSIS: Chronic | ICD-10-CM

## 2018-08-28 DIAGNOSIS — J84.9 ILD (INTERSTITIAL LUNG DISEASE): Primary | Chronic | ICD-10-CM

## 2018-08-28 LAB
BRPFT: ABNORMAL
DLCO ADJ PRE: 17.07 ML/(MIN*MMHG) (ref 16.15–27.61)
DLCO PRE: 15.11 ML/(MIN*MMHG) (ref 16.15–27.61)
DLCO SINGLE BREATH LLN: 16.15
DLCO SINGLE BREATH PRE REF: 69.1 %
DLCO SINGLE BREATH REF: 21.88
DLCOC SBVA LLN: 2.8
DLCOC SBVA PRE REF: 42.7 %
DLCOC SBVA REF: 4.13
DLCOC SINGLE BREATH LLN: 16.15
DLCOC SINGLE BREATH PRE REF: 78 %
DLCOC SINGLE BREATH REF: 21.88
DLCOVA LLN: 2.8
DLCOVA PRE REF: 37.8 %
DLCOVA PRE: 1.56 ML/(MIN*MMHG*L) (ref 2.8–5.46)
DLCOVA REF: 4.13
DLVAADJ PRE: 1.76 ML/(MIN*MMHG*L) (ref 2.8–5.46)
ERV LLN: 0.6
ERV PRE REF: 46.4 %
ERV PRE: 0.28 L (ref 0.6–0.6)
ERV REF: 0.6
ERVN2 LLN: 0.6
ERVN2 REF: 0.6
FEF 25 75 CHG: 3.1 %
FEF 25 75 LLN: 0.55
FEF 25 75 POST REF: 88.6 %
FEF 25 75 POST: 1.38 L/S (ref 0.55–2.57)
FEF 25 75 PRE REF: 85.9 %
FEF 25 75 PRE: 1.34 L/S (ref 0.55–2.57)
FEF 25 75 REF: 1.56
FET100 CHG: 254.9 %
FET100 POST: 7.81 SEC
FET100 PRE: 2.2 SEC
FEV1 CHG: -10.7 %
FEV1 FVC CHG: 3.1 %
FEV1 FVC LLN: 64
FEV1 FVC POST REF: 118.1 %
FEV1 FVC POST: 91.7 % (ref 64.06–91.22)
FEV1 FVC PRE REF: 114.5 %
FEV1 FVC PRE: 88.91 % (ref 64.06–91.22)
FEV1 FVC REF: 78
FEV1 LLN: 1.29
FEV1 POST REF: 47.2 %
FEV1 POST: 0.9 L (ref 1.29–2.5)
FEV1 PRE REF: 52.8 %
FEV1 PRE: 1 L (ref 1.29–2.5)
FEV1 REF: 1.9
FEV6 LLN: 1.61
FEV6 POST REF: 41.4 %
FEV6 POST: 0.97 L (ref 1.61–3.1)
FEV6 REF: 2.35
FRCN2 LLN: 2.02
FRCN2 REF: 2.84
FRCPL PRE: 2.3 L
FRCPLETH LLN: 2.02
FRCPLETH PREREF: 81.1 %
FRCPLETH REF: 2.84
FVC CHG: -13.4 %
FVC LLN: 1.71
FVC POST REF: 39.5 %
FVC POST: 0.98 L (ref 1.71–3.23)
FVC PRE REF: 45.6 %
FVC PRE: 1.13 L (ref 1.71–3.23)
FVC REF: 2.47
IVC PRE: 1.54 L (ref 1.71–3.23)
IVC SINGLE BREATH LLN: 1.71
IVC SINGLE BREATH PRE REF: 62.4 %
IVC SINGLE BREATH REF: 2.47
MVV LLN: 73
MVV PRE REF: 42.8 %
MVV PRE: 37 L/MIN (ref 73.47–99.41)
MVV REF: 86
PEF CHG: -1.4 %
PEF LLN: 2.62
PEF POST REF: 79 %
PEF POST: 3.76 L/S (ref 2.62–6.9)
PEF PRE REF: 80.1 %
PEF PRE: 3.81 L/S (ref 2.62–6.9)
PEF REF: 4.76
RAW LLN: 3.06
RAW PRE REF: 144.1 %
RAW PRE: 4.41 CMH2O*S/L (ref 3.06–3.06)
RAW REF: 3.06
RV LLN: 1.67
RV PRE REF: 82.3 %
RV PRE: 1.84 L (ref 1.67–2.82)
RV REF: 2.24
RVN2 LLN: 1.67
RVN2 REF: 2.24
RVN2TLCN2 LLN: 35
RVN2TLCN2 REF: 44
RVTLC LLN: 35
RVTLC PRE REF: 139.6 %
RVTLC PRE: 62.07 % (ref 34.87–54.05)
RVTLC REF: 44
TLC LLN: 4.31
TLC PRE REF: 56.1 %
TLC PRE: 2.97 L (ref 4.31–6.29)
TLC REF: 5.3
TLCN2 LLN: 4.31
TLCN2 REF: 5.3
VA PRE: 9.68 L (ref 5.15–5.15)
VA SINGLE BREATH LLN: 5.15
VA SINGLE BREATH PRE REF: 188 %
VA SINGLE BREATH REF: 5.15
VC LLN: 1.71
VC PRE REF: 45.6 %
VC PRE: 1.13 L (ref 1.71–3.23)
VC REF: 2.47
VCMAXN2 LLN: 1.71
VCMAXN2 REF: 2.47
VTGRAWPRE: 2.45 L

## 2018-08-28 PROCEDURE — 78582 LUNG VENTILAT&PERFUS IMAGING: CPT | Mod: TC

## 2018-08-28 PROCEDURE — 94729 DIFFUSING CAPACITY: CPT | Mod: PBBFAC

## 2018-08-28 PROCEDURE — 94060 EVALUATION OF WHEEZING: CPT | Mod: 26,S$PBB,, | Performed by: INTERNAL MEDICINE

## 2018-08-28 PROCEDURE — 94762 N-INVAS EAR/PLS OXIMTRY CONT: CPT | Mod: PBBFAC

## 2018-08-28 PROCEDURE — 99999 PR PBB SHADOW E&M-EST. PATIENT-LVL III: CPT | Mod: PBBFAC,,, | Performed by: INTERNAL MEDICINE

## 2018-08-28 PROCEDURE — A9539 TC99M PENTETATE: HCPCS

## 2018-08-28 PROCEDURE — 94726 PLETHYSMOGRAPHY LUNG VOLUMES: CPT | Mod: PBBFAC

## 2018-08-28 PROCEDURE — 94726 PLETHYSMOGRAPHY LUNG VOLUMES: CPT | Mod: 26,S$PBB,, | Performed by: INTERNAL MEDICINE

## 2018-08-28 PROCEDURE — 36600 WITHDRAWAL OF ARTERIAL BLOOD: CPT | Mod: PBBFAC

## 2018-08-28 PROCEDURE — 99213 OFFICE O/P EST LOW 20 MIN: CPT | Mod: PBBFAC,25 | Performed by: INTERNAL MEDICINE

## 2018-08-28 PROCEDURE — 71250 CT THORAX DX C-: CPT | Mod: TC

## 2018-08-28 PROCEDURE — 94729 DIFFUSING CAPACITY: CPT | Mod: 26,S$PBB,, | Performed by: INTERNAL MEDICINE

## 2018-08-28 PROCEDURE — 99215 OFFICE O/P EST HI 40 MIN: CPT | Mod: 25,S$PBB,, | Performed by: INTERNAL MEDICINE

## 2018-08-28 PROCEDURE — 36600 WITHDRAWAL OF ARTERIAL BLOOD: CPT | Mod: 59,S$PBB,, | Performed by: INTERNAL MEDICINE

## 2018-08-28 PROCEDURE — 94060 EVALUATION OF WHEEZING: CPT | Mod: PBBFAC

## 2018-08-28 PROCEDURE — 82803 BLOOD GASES ANY COMBINATION: CPT | Mod: PBBFAC

## 2018-08-28 NOTE — PROGRESS NOTES
Subjective:      Established patient    Patient ID: Stephanie Wilder is a 77 y.o. female.  Patient Active Problem List   Diagnosis    Diabetes mellitus    Dryness, eye - Both Eyes    Blind hypotensive eye - Right Eye    Tear film insufficiency, unspecified    Numbness of face    Colon cancer screening    Pain in or around eye    End stage renal disease    Hypoglycemia    DM (diabetes mellitus)    Type 2 diabetes mellitus with proliferative diabetic retinopathy without macular edema    Retinal ischemia    Traction detachment of left retina    Upper GI bleed    Hemodialysis-associated hypotension    Bilateral leg edema    Venous insufficiency of both lower extremities    Lipodermatosclerosis    Pulmonary hypertension associated with end-stage renal disease (ESRD) on dialysis    CRLD (chronic restrictive lung disease)    ILD (interstitial lung disease)       Problem list has been reviewed.    Chief Complaint: Pulmonary Hypertension      HPI    PFT, 6 MWT and ABG reviewed with patient who expressed and voiced understanding.   All questions were answered to the patients satisfaction.      Patients reports NYHA II dyspnea    The patient does not have currently have symptoms / an exacerbation.         No recent change in breathing.      A full  review of systems, past , family  and social histories was performed except as mentioned in the note above, these are non contributory to the main issues discussed today.       Previous Report Reviewed: lab reports, office notes and radiology reports     The following portions of the patient's history were reviewed and updated as appropriate: She  has a past medical history of Anemia, Constipation, Dementia, Dementia, Diabetes mellitus, Diabetic retinopathy, Dysphagia, End stage renal disease on dialysis, GERD (gastroesophageal reflux disease), Hypertension, Neuropathy, Pneumonia, and Traction detachment of left retina (9/28/2015).  She  has a past surgical history  that includes Cataract extraction; Mastectomy (Right); ESOPHAGOGASTRODUODENOSCOPY (EGD) (N/A, 2/26/2016); EGD (N/A, 6/4/2014); and COLONOSCOPY (N/A, 6/4/2014).  Her family history includes Diabetes in her child, maternal aunt, and paternal aunt; Glaucoma in her maternal aunt and paternal aunt; Heart attack (age of onset: 79) in her brother; Heart attack (age of onset: 80) in her maternal aunt.  She  reports that  has never smoked. she has never used smokeless tobacco. She reports that she does not drink alcohol or use drugs.  She has a current medication list which includes the following prescription(s): acetaminophen, amlodipine, carvedilol, cetirizine, clotrimazole-betamethasone 1-0.05%, cranberry fruit extract, dicyclomine, docusate sodium, erythromycin, escitalopram oxalate, ethyl chloride 100%, ferrous sulfate, gabapentin, guaifenesin, hydralazine, hydrocortisone, insulin asp prt-insulin aspart (novolog 70/30), insulin detemir u-100, lisinopril, loperamide, midodrine, mirtazapine, neomycin-polymyxin-dexamethasone, neomycin-polymyxin-dexamethasone, olanzapine, ondansetron, peg 400-propylene glycol (pf), polyethylene glycol, sevelamer carbonate, tramadol, and pantoprazole.  She has No Known Allergies..    Review of Systems   Constitutional: Positive for fatigue and weakness. Negative for chills and night sweats.   HENT: Negative for nosebleeds, sinus pressure and congestion.         Dentures   Eyes: Negative for itching.        Cataracts   Glaucoma.   Respiratory: Positive for orthopnea. Negative for snoring, cough, wheezing, dyspnea on extertion and Paroxysmal Nocturnal Dyspnea.    Cardiovascular: Positive for leg swelling.   Genitourinary: Positive for difficulty urinating.   Endocrine: Negative for cold intolerance and heat intolerance.    Musculoskeletal: Positive for arthralgias and back pain.   Skin: Negative for rash.   Gastrointestinal: Positive for acid reflux. Negative for abdominal pain.         "Constipation.   Hematochezia   Neurological: Negative for syncope, light-headedness and headaches.   Hematological: Excessive bruising.   All other systems reviewed and are negative.       Objective:     /60   Pulse 74   Resp 18   Ht 5' 6" (1.676 m)   Wt 66 kg (145 lb 8 oz)   SpO2 98%   BMI 23.48 kg/m²   Body mass index is 23.48 kg/m².     Physical Exam   Constitutional: She is oriented to person, place, and time. She appears well-developed and well-nourished.   HENT:   Head: Normocephalic.   OS Endopthalmitis   Eyes: EOM are normal. Pupils are equal, round, and reactive to light.   Neck: Normal range of motion. Neck supple.   Cardiovascular: Normal rate and regular rhythm.   Loud P2   Pulmonary/Chest: Effort normal. No respiratory distress. She has no wheezes. She has no rales.   Abdominal: Soft. Bowel sounds are normal.   Musculoskeletal: She exhibits edema.   Bilateral LE stasis dermatitis.    Neurological: She is alert and oriented to person, place, and time.   Skin: Skin is warm and dry.   Psychiatric: She has a normal mood and affect.   Nursing note and vitals reviewed.      Personal Diagnostic Review      ABG: pH: 7.9  PaO2: 69 PaCO2 46.0   SaO2 93%  Moderate hypoxemia with elevated A - a gradient.   Chronic (compensated) primary respiratory acidosis    pH < 7.35 and HCO3 < 25, for acute (uncompensated)  pH > 7.38 and HCO3 > 26, for chronic (compensated)    expected pH = 7.41  expected CO2 = 48  expected HCO3- = 27    Pulmonary function tests: FEV1: 1.00  (52.8  % predicted), FVC:  1.13 (45.6 % predicted), FEV1/FVC:  89, T.97 (56.1 % predicted), RV/TLVC: 62 (139.6 % predicted), DLCO: 15.11 (69.1 % predicted)  Normla airflow. Severe restriction. Diffusion capacity is mildly reduced.       CT of chest performed on 18:      1. There is chronic enlarged abnormal appearance to the thyroid gland.  It appears increased in size on today's study persistent heterogeneous masslike appearance and " deviation of the trachea to the right of midline.  2. A few lymph nodes are seen within the mediastinum.  These do not appear significantly changed over the interval.  3. Extensive chronic interstitial disease of the lungs is present.  There is calcification of the pleural surface on the right as well as irregular pleural thickening.  This appears diffusely increased in the right lower lobe on today's examination but no discrete mass is identified.  4. A pattern of diffuse ground glass type increased attenuation with patchy distribution is seen within the lungs.  5. Multiple chronic findings are present.  Incidental note is made of gallstones.       NM Lung Ventilation Perfusion Imaging 08/28/18::   Ventilation-perfusion lung scan demonstrates low probability of pulmonary embolism.  Chronic findings are present.    Assessment / Plan:       Discussed diagnosis, its evaluation, treatment and usual course. All questions answered.    Problem List Items Addressed This Visit        Pulmonary    Pulmonary hypertension associated with end-stage renal disease (ESRD) on dialysis    Current Assessment & Plan     Continue HD Mon Wed & Fri. Aggressive volume control with HD.          Relevant Orders    Pulse oximetry overnight    CRLD (chronic restrictive lung disease)    Current Assessment & Plan     CRLD ROS: Secondary to ILD. taking medications as instructed, no medication side effects noted, no significant ongoing wheezing or shortness of breath, using bronchodilator MDI less than twice a week.   New concerns: None.   Exam: appears well, vitals normal, no respiratory distress, acyanotic, normal RR.   Assessment:  CRLD stable.   Plan: Does not curently require any inhalers. ONSAT on room air. Orders as documented in EMR.         Relevant Orders    Pulse oximetry overnight    ILD (interstitial lung disease) - Primary    Current Assessment & Plan     Etiology unclear. Continue to monitor radiologically.                  TIME  SPENT WITH PATIENT: Time spent: 40 minutes in face to face  discussion concerning diagnosis, prognosis, review of lab and test results, benefits of treatment as well as management of disease, counseling of patient and coordination of care between various health  care providers . Greater than half the time spent was used for coordination of care and counseling of patient.       Follow-up in about 1 month (around 9/28/2018) for Interstitial Lung Disease, Restrictive Lung Disease, Pulmonary Hypertension.

## 2018-08-28 NOTE — PROCEDURES
PHYSICIAN INTERPRETATION:    pH: 7.9  PaO2: 69 PaCO2 46.0   SaO2 93%    Moderate hypoxemia with elevated A - a gradient.   Chronic (compensated) primary respiratory acidosis     pH < 7.35 and HCO3 < 25, for acute (uncompensated)  pH > 7.38 and HCO3 > 26, for chronic (compensated)     expected pH = 7.41  expected CO2 = 48  expected HCO3- = 27

## 2018-08-28 NOTE — PATIENT INSTRUCTIONS
Lung Anatomy  Your lungs take air in to give your body oxygen, which the body needs to work. Your lungs, like all the tissues in your body, are made up of billions of tiny specialized cells. Old lung cells die and are replaced by new, identical lung cells. This natural process helps ensure healthy lungs.    Date Last Reviewed: 11/1/2016  © 2633-1962 [employer]. [address] [place] [address], [place] [address]. All rights reserved. This information is not intended as a substitute for professional medical care. Always follow your healthcare professional's instructions.

## 2018-08-28 NOTE — ASSESSMENT & PLAN NOTE
CRLD ROS: Secondary to ILD. taking medications as instructed, no medication side effects noted, no significant ongoing wheezing or shortness of breath, using bronchodilator MDI less than twice a week.   New concerns: None.   Exam: appears well, vitals normal, no respiratory distress, acyanotic, normal RR.   Assessment:  CRLD stable.   Plan: Does not curently require any inhalers. ONSAT on room air. Orders as documented in EMR.

## 2018-08-29 ENCOUNTER — TELEPHONE (OUTPATIENT)
Dept: PULMONOLOGY | Facility: CLINIC | Age: 78
End: 2018-08-29

## 2018-08-29 LAB
ALLENS TEST: ABNORMAL
DELSYS: ABNORMAL
HCO3 UR-SCNC: 28 MMOL/L (ref 24–28)
MODE: ABNORMAL
PCO2 BLDA: 46 MMHG (ref 35–45)
PH SMN: 7.39 [PH] (ref 7.35–7.45)
PO2 BLDA: 69 MMHG (ref 80–100)
POC BE: 3 MMOL/L
POC SATURATED O2: 93 % (ref 95–100)
SAMPLE: ABNORMAL
SITE: ABNORMAL

## 2018-08-29 NOTE — PROCEDURES
[facility]  [address] * [place] [address]  Telephone: [phone]  Test date: 18 Start: 18 23:47:52 Stephanie Wilder  Doctor: Dr. Welsh End: 18 05:30:00 [id number]  Oximetry: Summary Report  Comments: Room Air  Recording time: 05:42:08 Highest pulse: 74 Highest SpO2: 99%  Excluded samplin:00:00 Lowest pulse: 57 Lowest SpO2: 90%  Total valid samplin:42:08 Mean pulse: 63 Mean SpO2: 94.8%  1 S.D.: 4.2 1 S.D.: 1.6  Time with SpO2<90: 0:00:00, 0.0%  Time with SpO2<80: 0:00:00, 0.0%  Time with SpO2<70: 0:00:00, 0.0%  Time with SpO2<60: 0:00:00, 0.0%  Time with SpO2<88: 0:00:00, 0.0%  Time with SpO2 =>90: 5:42:08, 100.0%  Time with SpO2=>80 & <90: 0:00:00, 0.0%  Time with SpO2=>70 & <80: 0:00:00, 0.0%  Time with SpO2=>60 & <70: 0:00:00, 0.0%  There was no time spent with a saturation less than or equal to 88.  A desaturation event was defined as a decrease of saturation by 4 or more.  No events were excluded due to artifact.  There were 5 desaturation events over 3 minutes duration.  There were 4 desaturation events of less than 3 minutes duration during which:  The mean high was 97.0%. The mean low was 91.5%.  The number of these events that were:  > 0 & <10 seconds: 0 > 0 seconds: 4  =>10 & <20 seconds: 0 =>10 seconds: 4  =>20 & <30 seconds: 0 =>20 seconds: 4  =>30 & <40 seconds: 0 =>30 seconds: 4  =>40 & <50 seconds: 0 =>40 seconds: 4  =>50 & <60 seconds: 0 =>50 seconds: 4  =>60 seconds: 4 =>60 seconds: 4  The mean length of desaturation events that were >=10 sec & <=3 mins was: 108.0 sec.  Desaturation event index (events >=10 sec per sampled hour): 0.7  Desaturation event index (events >= 0 sec per sampled hour): 0.7  © 1794-7172 [employer] Oximetry version Standard OA3051.  Oximeter: [employer] 920M Plus memory, 4 second resolution.      PHYSICIAN INTERPRETATION:    OVERNIGHT OXIMETRY REPORT:    Dictated by: Duran Yancey M.D.  Test date:  18  Dictated on: 2018      Comment: This test was performed on Room Air     A desaturation event was defined as a decrease of saturation by 4 or more.    REPORT SUMMARY  Total recording time: 05:42:08  Excluded samplin:00:00:  Total valid samplin:42:08  High pulse: 74 /min  Low pulse: 57 /min    Mean pulse: 63/min    High SpO2: 99%    Low SpO2: 90%    Mean SpO2  94.8 %  Cumulative time with oxygen saturation less than 88% (TC88): 00:00:00 ( 0/0%)      CLINICAL INTERPRETATION  Results are normal    Medicare Criteria Comments:   Oximetry test results suggest the patient does not fall under Medicare Group 1 Criteria. ( Arterial oxygen saturation at or below 88% for at least 5 minutes taken during sleep)    Details about Medicare Group Criteria coverage can be found at http://www.cms.hhs.gov/manuals/downloads/

## 2018-08-29 NOTE — TELEPHONE ENCOUNTER
OVERNIGHT OXIMETRY REPORT:    Dictated by: Duran Yancey M.D.  Test date: 18  Dictated on: 2018      Comment: This test was performed on Room Air     A desaturation event was defined as a decrease of saturation by 4 or more.    REPORT SUMMARY  Total recording time: 05:42:08  Excluded samplin:00:00:  Total valid samplin:42:08  High pulse: 74 /min  Low pulse: 57 /min    Mean pulse: 63/min    High SpO2: 99%    Low SpO2: 90%    Mean SpO2  94.8 %  Cumulative time with oxygen saturation less than 88% (TC88): 00:00:00 ( 0/0%)      CLINICAL INTERPRETATION  Results are normal    Medicare Criteria Comments:   Oximetry test results suggest the patient does not fall under Medicare Group 1 Criteria. ( Arterial oxygen saturation at or below 88% for at least 5 minutes taken during sleep)      PLAN: Normal overnight oximetry reports. Please inform patient

## 2018-10-02 ENCOUNTER — OFFICE VISIT (OUTPATIENT)
Dept: PULMONOLOGY | Facility: CLINIC | Age: 78
End: 2018-10-02
Payer: MEDICARE

## 2018-10-02 VITALS
OXYGEN SATURATION: 96 % | HEIGHT: 66 IN | RESPIRATION RATE: 18 BRPM | BODY MASS INDEX: 27 KG/M2 | WEIGHT: 168 LBS | HEART RATE: 73 BPM | DIASTOLIC BLOOD PRESSURE: 73 MMHG | SYSTOLIC BLOOD PRESSURE: 140 MMHG

## 2018-10-02 DIAGNOSIS — J98.4 CRLD (CHRONIC RESTRICTIVE LUNG DISEASE): ICD-10-CM

## 2018-10-02 DIAGNOSIS — J84.9 ILD (INTERSTITIAL LUNG DISEASE): ICD-10-CM

## 2018-10-02 DIAGNOSIS — N18.6 PULMONARY HYPERTENSION ASSOCIATED WITH END-STAGE RENAL DISEASE (ESRD) ON DIALYSIS: Primary | ICD-10-CM

## 2018-10-02 DIAGNOSIS — Z99.2 PULMONARY HYPERTENSION ASSOCIATED WITH END-STAGE RENAL DISEASE (ESRD) ON DIALYSIS: Primary | ICD-10-CM

## 2018-10-02 DIAGNOSIS — I27.29 PULMONARY HYPERTENSION ASSOCIATED WITH END-STAGE RENAL DISEASE (ESRD) ON DIALYSIS: Primary | ICD-10-CM

## 2018-10-02 PROCEDURE — 99999 PR PBB SHADOW E&M-EST. PATIENT-LVL III: CPT | Mod: PBBFAC,,, | Performed by: INTERNAL MEDICINE

## 2018-10-02 PROCEDURE — 99213 OFFICE O/P EST LOW 20 MIN: CPT | Mod: PBBFAC | Performed by: INTERNAL MEDICINE

## 2018-10-02 PROCEDURE — 99215 OFFICE O/P EST HI 40 MIN: CPT | Mod: S$PBB,,, | Performed by: INTERNAL MEDICINE

## 2018-10-02 NOTE — PROGRESS NOTES
Subjective:      Established patient    Patient ID: Stephanie Wilder is a 77 y.o. female.  Patient Active Problem List   Diagnosis    Diabetes mellitus    Dryness, eye - Both Eyes    Blind hypotensive eye - Right Eye    Tear film insufficiency, unspecified    Numbness of face    Colon cancer screening    Pain in or around eye    End stage renal disease    Hypoglycemia    DM (diabetes mellitus)    Type 2 diabetes mellitus with proliferative diabetic retinopathy without macular edema    Retinal ischemia    Traction detachment of left retina    Upper GI bleed    Hemodialysis-associated hypotension    Bilateral leg edema    Venous insufficiency of both lower extremities    Lipodermatosclerosis    Pulmonary hypertension associated with end-stage renal disease (ESRD) on dialysis    CRLD (chronic restrictive lung disease)    ILD (interstitial lung disease)       Problem list has been reviewed.    Chief Complaint: Pulmonary Hypertension      HPI    Pulmonary hypertension associated with ESRD on HD. (Not hemodynamically significant.)     ONSAT reviewed with patient who expressed and voiced understanding.   All questions were answered to the patients satisfaction.        Patients reports NYHA II dyspnea    The patient does not have currently have symptoms / an exacerbation.       No recent change in breathing.      A full  review of systems, past , family  and social histories was performed except as mentioned in the note above, these are non contributory to the main issues discussed today.       Previous Report Reviewed: lab reports, office notes and radiology reports     The following portions of the patient's history were reviewed and updated as appropriate: She  has a past medical history of Anemia, Constipation, Dementia, Dementia, Diabetes mellitus, Diabetic retinopathy, Dysphagia, End stage renal disease on dialysis, GERD (gastroesophageal reflux disease), Hypertension, Neuropathy, Pneumonia, and  Traction detachment of left retina (9/28/2015).  She  has a past surgical history that includes Cataract extraction; Mastectomy (Right); ESOPHAGOGASTRODUODENOSCOPY (EGD) (N/A, 2/26/2016); EGD (N/A, 6/4/2014); and COLONOSCOPY (N/A, 6/4/2014).  Her family history includes Diabetes in her child, maternal aunt, and paternal aunt; Glaucoma in her maternal aunt and paternal aunt; Heart attack (age of onset: 79) in her brother; Heart attack (age of onset: 80) in her maternal aunt.  She  reports that  has never smoked. she has never used smokeless tobacco. She reports that she does not drink alcohol or use drugs.  She has a current medication list which includes the following prescription(s): amlodipine, carvedilol, cetirizine, dicyclomine, docusate sodium, hydralazine, hydrocortisone, insulin asp prt-insulin aspart (novolog 70/30), polyethylene glycol, sevelamer carbonate, tramadol, acetaminophen, clotrimazole-betamethasone 1-0.05%, cranberry fruit extract, erythromycin, escitalopram oxalate, ethyl chloride 100%, ferrous sulfate, gabapentin, guaifenesin, insulin detemir u-100, lisinopril, loperamide, midodrine, mirtazapine, neomycin-polymyxin-dexamethasone, neomycin-polymyxin-dexamethasone, olanzapine, ondansetron, pantoprazole, and peg 400-propylene glycol (pf).  She has No Known Allergies..    Review of Systems   Constitutional: Positive for fatigue and weakness. Negative for chills and night sweats.   HENT: Negative for nosebleeds, sinus pressure and congestion.         Dentures   Eyes: Negative for itching.        Cataracts   Glaucoma.   Respiratory: Positive for orthopnea. Negative for snoring, cough, wheezing, dyspnea on extertion and Paroxysmal Nocturnal Dyspnea.    Cardiovascular: Positive for leg swelling.   Genitourinary: Positive for difficulty urinating.   Endocrine: Negative for cold intolerance and heat intolerance.    Musculoskeletal: Positive for arthralgias and back pain.   Skin: Negative for rash.  "  Gastrointestinal: Positive for acid reflux. Negative for abdominal pain.        Constipation.   Hematochezia   Neurological: Negative for syncope, light-headedness and headaches.   Hematological: Excessive bruising.   All other systems reviewed and are negative.       Objective:     BP (!) 140/73   Pulse 73   Resp 18   Ht 5' 6" (1.676 m)   Wt 76.2 kg (168 lb)   SpO2 96%   BMI 27.12 kg/m²   Body mass index is 27.12 kg/m².     Physical Exam   Constitutional: She is oriented to person, place, and time. She appears well-developed and well-nourished.   HENT:   Head: Normocephalic.   OS Endopthalmitis   Eyes: EOM are normal. Pupils are equal, round, and reactive to light.   Neck: Normal range of motion. Neck supple.   Cardiovascular: Normal rate and regular rhythm.   Pulmonary/Chest: Effort normal. No respiratory distress. She has no wheezes. She has no rales.   Abdominal: Soft. Bowel sounds are normal.   Musculoskeletal: She exhibits edema.   Bilateral LE stasis dermatitis.    Neurological: She is alert and oriented to person, place, and time.   Skin: Skin is warm and dry.   Psychiatric: She has a normal mood and affect.   Nursing note and vitals reviewed.      Personal Diagnostic Review    OVERNIGHT OXIMETRY REPORT:    Dictated by: Duran Yancey M.D.  Test date: 18  Dictated on: 2018      Comment: This test was performed on Room Air     A desaturation event was defined as a decrease of saturation by   4 or more.    REPORT SUMMARY  Total recording time: 05:42:08  Excluded samplin:00:00:  Total valid samplin:42:08  High pulse: 74 /min  Low pulse: 57 /min    Mean pulse: 63/min    High SpO2: 99%    Low SpO2: 90%    Mean SpO2  94.8 %  Cumulative time with oxygen saturation less than 88% (TC88):   00:00:00 ( 0/0%)      CLINICAL INTERPRETATION  Results are normal    Medicare Criteria Comments:   Oximetry test results suggest the patient does not fall under   Medicare Group 1 Criteria. ( Arterial " oxygen saturation at or   below 88% for at least 5 minutes taken during sleep)    Assessment / Plan:       Discussed diagnosis, its evaluation, treatment and usual course. All questions answered.    Problem List Items Addressed This Visit        Pulmonary    Pulmonary hypertension associated with end-stage renal disease (ESRD) on dialysis - Primary    Current Assessment & Plan       Results for orders placed or performed in visit on 07/12/18   2D echo with color flow doppler   Result Value Ref Range    EF 60 55 - 65    Est. PA Systolic Pressure 44.99 (A)     Tricuspid Valve Regurgitation MILD      mPAP = 0.61 44.99 (sPAP)  + 2 = 29.45 mmhg    The etiopathogenic mechanisms that have been studied in relatively small studies:    1. Arteriovenous fistula-induced increased cardiac output.   2. Endothelial dysfunction, dysregulation of vascular tone and systemic inflammation.   3. Vasoconstriction and vascular sclerosis due to microbubbles in the dialysis circuit.     I have discussed etiology, clinical course and history, diagnostic evaluation, treatment, complications associated with treatment of pulmonary  hypertension in detail with the patient.  The patient expressed and verbalized understanding.  I have also discussed complications of untreated pulmonary hypertension with the patient who expressed and verbalized understanding.  All questions answered.    Continue HD Mon Wed & Fri. Aggressive volume control with HD.          CRLD (chronic restrictive lung disease)    Current Assessment & Plan     CRLD ROS: Secondary to ILD.  no significant ongoing wheezing or shortness of breath   New concerns: None.   Exam: appears well, vitals normal, no respiratory distress, acyanotic, normal RR.   Assessment:  CRLD stable.   Plan: DENIES ANY DELGADO. Does not curently require any inhalers.          ILD (interstitial lung disease)    Current Assessment & Plan     Etiology unclear. Continue to monitor radiologically.                   TIME SPENT WITH PATIENT: Time spent: 40 minutes in face to face  discussion concerning diagnosis, prognosis, review of lab and test results, benefits of treatment as well as management of disease, counseling of patient and coordination of care between various health  care providers . Greater than half the time spent was used for coordination of care and counseling of patient.       Follow-up if symptoms worsen or fail to improve, for Chronic Respiratory Failure, Interstitial Lung Disease, Pulmonary Hypertension.

## 2018-10-02 NOTE — ASSESSMENT & PLAN NOTE
CRLD ROS: Secondary to ILD.  no significant ongoing wheezing or shortness of breath   New concerns: None.   Exam: appears well, vitals normal, no respiratory distress, acyanotic, normal RR.   Assessment:  CRLD stable.   Plan: DENIES ANY DELGADO. Does not curently require any inhalers.

## 2018-10-02 NOTE — PATIENT INSTRUCTIONS
Lung Anatomy  Your lungs take air in to give your body oxygen, which the body needs to work. Your lungs, like all the tissues in your body, are made up of billions of tiny specialized cells. Old lung cells die and are replaced by new, identical lung cells. This natural process helps ensure healthy lungs.    Date Last Reviewed: 11/1/2016  © 5831-2518 [employer]. [address] [place] [address], [place] [address]. All rights reserved. This information is not intended as a substitute for professional medical care. Always follow your healthcare professional's instructions.

## 2018-10-02 NOTE — ASSESSMENT & PLAN NOTE
Results for orders placed or performed in visit on 07/12/18   2D echo with color flow doppler   Result Value Ref Range    EF 60 55 - 65    Est. PA Systolic Pressure 44.99 (A)     Tricuspid Valve Regurgitation MILD      mPAP = 0.61 44.99 (sPAP)  + 2 = 29.45 mmhg    The etiopathogenic mechanisms that have been studied in relatively small studies:    1. Arteriovenous fistula-induced increased cardiac output.   2. Endothelial dysfunction, dysregulation of vascular tone and systemic inflammation.   3. Vasoconstriction and vascular sclerosis due to microbubbles in the dialysis circuit.     I have discussed etiology, clinical course and history, diagnostic evaluation, treatment, complications associated with treatment of pulmonary  hypertension in detail with the patient.  The patient expressed and verbalized understanding.  I have also discussed complications of untreated pulmonary hypertension with the patient who expressed and verbalized understanding.  All questions answered.    Continue HD Mon Wed & Fri. Aggressive volume control with HD.

## 2018-10-23 ENCOUNTER — CLINICAL SUPPORT (OUTPATIENT)
Dept: CARDIOLOGY | Facility: CLINIC | Age: 78
End: 2018-10-23
Payer: MEDICARE

## 2018-10-23 ENCOUNTER — OFFICE VISIT (OUTPATIENT)
Dept: CARDIOLOGY | Facility: CLINIC | Age: 78
End: 2018-10-23
Payer: MEDICARE

## 2018-10-23 VITALS
SYSTOLIC BLOOD PRESSURE: 146 MMHG | BODY MASS INDEX: 27 KG/M2 | DIASTOLIC BLOOD PRESSURE: 88 MMHG | WEIGHT: 168 LBS | HEART RATE: 80 BPM | HEIGHT: 66 IN

## 2018-10-23 DIAGNOSIS — I95.3 HEMODIALYSIS-ASSOCIATED HYPOTENSION: ICD-10-CM

## 2018-10-23 DIAGNOSIS — I15.1 HYPERTENSION SECONDARY TO OTHER RENAL DISORDERS: Primary | ICD-10-CM

## 2018-10-23 DIAGNOSIS — I15.8 OTHER SECONDARY HYPERTENSION: ICD-10-CM

## 2018-10-23 DIAGNOSIS — R60.0 BILATERAL LEG EDEMA: ICD-10-CM

## 2018-10-23 DIAGNOSIS — M79.3 LIPODERMATOSCLEROSIS: ICD-10-CM

## 2018-10-23 DIAGNOSIS — Z99.2 PULMONARY HYPERTENSION ASSOCIATED WITH END-STAGE RENAL DISEASE (ESRD) ON DIALYSIS: Primary | ICD-10-CM

## 2018-10-23 DIAGNOSIS — I27.29 PULMONARY HYPERTENSION ASSOCIATED WITH END-STAGE RENAL DISEASE (ESRD) ON DIALYSIS: Primary | ICD-10-CM

## 2018-10-23 DIAGNOSIS — E11.21 TYPE 2 DIABETES MELLITUS WITH DIABETIC NEPHROPATHY, UNSPECIFIED WHETHER LONG TERM INSULIN USE: ICD-10-CM

## 2018-10-23 DIAGNOSIS — I87.2 VENOUS INSUFFICIENCY OF BOTH LOWER EXTREMITIES: ICD-10-CM

## 2018-10-23 DIAGNOSIS — J84.9 ILD (INTERSTITIAL LUNG DISEASE): ICD-10-CM

## 2018-10-23 DIAGNOSIS — N18.6 END STAGE RENAL DISEASE: ICD-10-CM

## 2018-10-23 DIAGNOSIS — N18.6 PULMONARY HYPERTENSION ASSOCIATED WITH END-STAGE RENAL DISEASE (ESRD) ON DIALYSIS: Primary | ICD-10-CM

## 2018-10-23 PROCEDURE — 93005 ELECTROCARDIOGRAM TRACING: CPT | Mod: PBBFAC | Performed by: INTERNAL MEDICINE

## 2018-10-23 PROCEDURE — 99213 OFFICE O/P EST LOW 20 MIN: CPT | Mod: PBBFAC | Performed by: INTERNAL MEDICINE

## 2018-10-23 PROCEDURE — 99999 PR PBB SHADOW E&M-EST. PATIENT-LVL III: CPT | Mod: PBBFAC,,, | Performed by: INTERNAL MEDICINE

## 2018-10-23 PROCEDURE — 99215 OFFICE O/P EST HI 40 MIN: CPT | Mod: S$PBB,,, | Performed by: INTERNAL MEDICINE

## 2018-10-23 PROCEDURE — 93010 ELECTROCARDIOGRAM REPORT: CPT | Mod: S$PBB,,, | Performed by: INTERNAL MEDICINE

## 2018-10-23 RX ORDER — DOXYCYCLINE 100 MG/1
100 CAPSULE ORAL DAILY
Status: ON HOLD | COMMUNITY
End: 2022-03-18 | Stop reason: HOSPADM

## 2018-10-23 NOTE — PROGRESS NOTES
Subjective:   Patient ID:  Stephanie Wilder is a 77 y.o. female who presents for cardiac consult of Congestive Heart Failure (3 mth f/u)      HPI  The patient came in today for cardiac consult of Congestive Heart Failure (3 mth f/u)    Referring Provider: Michael Cervantes  Reason for consult: Leg swelling    This is a 77 year old female pt with current medical conditions HTN, venous insufficiency, ESRD on HD T,TH, Sat, DM, neuropathy, anemia, dementia, gerd presents for follow up CV evaluation of LE edema.     6/29/18  Pt has been having presyncopal feelings during HD. She has been having LE edema x 2 months, gradually getting worse. No SOB, but cannot lay flat, somewhat describes orthopnea.   No chest pain. No palpitations. Has been on HD for 13 years. Can walk to bathroom and back. Uses wheelchair, can't walk well overall due to weakness in legs. PT has syncopal episode 12 years ago since episode she couldn't walk, no stroke or other etiology found for lack of walking.     10/23/18  Pt had 2D ECHO with normal Bi V function, Pulm HTN, mild TR. She has LE cellulitis on PO abx, has not cleared for about 2-3 months now, no fevers/chills. Compression stockings have been helpful.   ECG - NSR, RBBB, 1st deg AVB, septal infarct - old    Patient feels no chest pain, no sob,  no PND, no palpitation, no syncope, no CNS symptoms.    Patient is compliant with medications.    2D ECHO  CONCLUSIONS     1 - Concentric hypertrophy.     2 - No wall motion abnormalities.     3 - Normal left ventricular systolic function (EF 60-65%).     4 - Indeterminate LV diastolic function.     5 - Normal right ventricular systolic function .     6 - Pulmonary hypertension. The estimated PA systolic pressure is 45 mmHg.     7 - Mild tricuspid regurgitation.     Past Medical History:   Diagnosis Date    Anemia     Constipation     Dementia     Dementia     Diabetes mellitus     Diabetic retinopathy     Dysphagia     End stage renal disease on  dialysis     Tu, Th, Sat    GERD (gastroesophageal reflux disease)     Hypertension     Neuropathy     Pneumonia     Traction detachment of left retina 9/28/2015       Past Surgical History:   Procedure Laterality Date    CATARACT EXTRACTION      COLONOSCOPY N/A 6/4/2014    [facility]    EGD N/A 6/4/2014    Per[facility] by [facility] ENDO    ESOPHAGOGASTRODUODENOSCOPY (EGD) N/A 2/26/2016    Performed by [facility]ren Manning MD at [facility] END[facility]    MASTECTOMY Right        Social History     Tobacco Use    Smoking status: Never Smoker    Smokeless tobacco: Never Used   Substance Use Topics    Alcohol use: No    Drug use: No       Family History   Problem Relation Age of Onset    Diabetes Maternal Aunt     Glaucoma Maternal Aunt     Heart attack Maternal Aunt 80    Diabetes Paternal Aunt     Glaucoma Paternal Aunt     Diabetes Child     Heart attack Brother 79          Medication List           Accurate as of 10/23/18  3:24 PM. If you have any questions, ask your nurse or doctor.               CONTINUE taking these medications    acetaminophen 325 MG tablet  Commonly known as:  TYLENOL     amLODIPine 10 MG tablet  Commonly known as:  NORVASC     carvedilol 25 MG tablet  Commonly known as:  COREG     cetirizine 10 MG tablet  Commonly known as:  ZYRTEC     CRANBERRY ORAL     dicyclomine 10 MG capsule  Commonly known as:  BENTYL     docusate sodium 100 MG capsule  Commonly known as:  COLACE     doxycycline 100 MG Cap  Commonly known as:  VIBRAMYCIN     erythromycin ophthalmic ointment  Commonly known as:  ROMYCIN  Place into the left eye every evening.     escitalopram oxalate 10 MG tablet  Commonly known as:  LEXAPRO  Take 1 tablet (10 mg total) by mouth once daily. 1 Tablet Oral Every day     ethyl chloride 100% 100 % spray     ferrous sulfate 325 mg (65 mg iron) Tab tablet  Commonly known as:  FEOSOL     gabapentin 100 MG capsule  Commonly known  as:  NEURONTIN     guaiFENesin 600 mg 12 hr tablet  Commonly known as:  MUCINEX     hydrALAZINE 50 MG tablet  Commonly known as:  APRESOLINE     insulin detemir U-100 100 unit/mL injection  Commonly known as:  LEVEMIR     lisinopril 40 MG tablet  Commonly known as:  PRINIVIL,ZESTRIL     loperamide 2 mg capsule  Commonly known as:  IMODIUM     LOTRISONE cream  Generic drug:  clotrimazole-betamethasone 1-0.05%     midodrine 5 MG Tab  Commonly known as:  PROAMATINE     mirtazapine 30 MG tablet  Commonly known as:  REMERON     * neomycin-polymyxin-dexamethasone 3.5 mg/g-10,000 unit/g-0.1 % Oint  Commonly known as:  DEXACINE     * neomycin-polymyxin-dexamethasone 3.5mg/mL-10,000 unit/mL-0.1 % Drps  Commonly known as:  MAXITROL  Place 1 drop into the right eye 4 (four) times daily. USE 1 DROP 4 TIMES A DAY FOR 1 WEEK INTO THE AFFECTED EYE     NovoLOG Mix 70-30 U-100 Insuln 100 unit/mL (70-30) Soln  Generic drug:  insulin asp prt-insulin aspart (NOVOLOG 70/30)     OLANZapine 5 MG tablet  Commonly known as:  ZyPREXA     ondansetron 4 MG tablet  Commonly known as:  ZOFRAN     pantoprazole 40 MG tablet  Commonly known as:  PROTONIX  Take 1 tablet (40 mg total) by mouth 2 (two) times daily.     polyethylene glycol 17 gram Pwpk  Commonly known as:  GLYCOLAX     PREPARATION H HYDROCORTISONE 1 % cream  Generic drug:  hydrocortisone     sevelamer carbonate 800 mg Tab  Commonly known as:  RENVELA     SYSTANE ULTRA (PF) 0.4-0.3 % Dpet  Generic drug:  peg 400-propylene glycol (PF)     traMADol 50 mg tablet  Commonly known as:  ULTRAM         * This list has 2 medication(s) that are the same as other medications prescribed for you. Read the directions carefully, and ask your doctor or other care provider to review them with you.                Review of Systems   Constitutional: Negative.    HENT: Negative.    Eyes: Negative.    Respiratory: Negative.    Cardiovascular: Positive for leg swelling.   Gastrointestinal: Negative.   "  Genitourinary: Negative.    Musculoskeletal: Negative.    Skin: Negative.    Neurological: Positive for focal weakness.   Endo/Heme/Allergies: Negative.    Psychiatric/Behavioral: Negative.    All 12 systems otherwise negative.      Wt Readings from Last 3 Encounters:   10/23/18 76.2 kg (168 lb)   10/02/18 76.2 kg (168 lb)   08/28/18 66 kg (145 lb 8 oz)     Temp Readings from Last 3 Encounters:   06/22/16 98.6 °F (37 °C) (Oral)   04/11/16 98.9 °F (37.2 °C) (Oral)   03/04/16 98.9 °F (37.2 °C) (Axillary)     BP Readings from Last 3 Encounters:   10/23/18 (!) 146/88   10/02/18 (!) 140/73   08/28/18 126/60     Pulse Readings from Last 3 Encounters:   10/23/18 80   10/02/18 73   08/28/18 74       BP (!) 146/88 (Patient Position: Sitting, BP Method: Large (Manual))   Pulse 80   Ht 5' 6" (1.676 m)   Wt 76.2 kg (168 lb)   BMI 27.12 kg/m²     Objective:   Physical Exam   Constitutional: She is oriented to person, place, and time. She appears well-developed and well-nourished. No distress.   HENT:   Head: Normocephalic and atraumatic.   Nose: Nose normal.   Mouth/Throat: Oropharynx is clear and moist.   Eyes: Conjunctivae and EOM are normal. No scleral icterus.   Neck: Normal range of motion. Neck supple. No JVD present. No thyromegaly present.   Cardiovascular: Normal rate, regular rhythm, S1 normal and S2 normal. Exam reveals no gallop, no S3, no S4 and no friction rub.   Murmur heard.   Midsystolic murmur is present with a grade of 2/6 at the upper right sternal border.  Pulmonary/Chest: Effort normal and breath sounds normal. No stridor. No respiratory distress. She has no wheezes. She has no rales. She exhibits no tenderness.   Abdominal: Soft. Bowel sounds are normal. She exhibits no distension and no mass. There is no tenderness. There is no rebound.   Genitourinary:   Genitourinary Comments: Deferred   Musculoskeletal: Normal range of motion. She exhibits edema (2+ b/l). She exhibits no tenderness or deformity. "   Lymphadenopathy:     She has no cervical adenopathy.   Neurological: She is alert and oriented to person, place, and time. She exhibits normal muscle tone. Coordination normal.   Skin: Skin is warm and dry. Rash (lipodermatosclerosis on B/L LE) noted. She is not diaphoretic. No erythema. No pallor.   Psychiatric: She has a normal mood and affect. Her behavior is normal. Judgment and thought content normal.   Nursing note and vitals reviewed.      Lab Results   Component Value Date     08/02/2018    K 4.2 08/02/2018     08/02/2018    CO2 28 08/02/2018    BUN 32 (H) 08/02/2018    CREATININE 4.5 (H) 08/02/2018     (H) 08/02/2018    HGBA1C 6.2 02/27/2016    MG 2.0 02/27/2016    AST 11 08/02/2018    ALT 9 (L) 08/02/2018    ALBUMIN 3.2 (L) 08/02/2018    PROT 7.6 08/02/2018    BILITOT 0.5 08/02/2018    WBC 4.46 08/02/2018    HGB 10.2 (L) 08/02/2018    HCT 34.7 (L) 08/02/2018    HCT 38 02/26/2016     (H) 08/02/2018     (L) 08/02/2018    INR 1.1 02/26/2016    TSH <0.010 (L) 04/13/2016     (H) 08/02/2018     Assessment:      1. Pulmonary hypertension associated with end-stage renal disease (ESRD) on dialysis    2. ILD (interstitial lung disease)    3. Venous insufficiency of both lower extremities    4. Hemodialysis-associated hypotension    5. Lipodermatosclerosis    6. End stage renal disease    7. Type 2 diabetes mellitus with diabetic nephropathy, unspecified whether long term insulin use    8. Bilateral leg edema        Plan:   1. LE edema   - neg LE u/s for DVTs at nursing home per pt  - likely due to chronic venous insufficiency  - rec compression stockings daily once cellulitis resolved  - low salt diet    2. HTN/hypotension with HD  - cont meds per nephrology    3. ESRD  - cont HD  - compression stockings will increase venous return to allow more volume removal with HD    4. Pulm HTN  - cont HD and f/u with pulm    5. Venous insufficiency  - rec compression stockings once  cellulitis resolved  - order u/s for LE    Thank you for allowing me to participate in this patient's care. Please do not hesitate to contact me with any questions or concerns. Consult note has been forwarded to the referral physician.

## 2018-10-23 NOTE — PATIENT INSTRUCTIONS
Understanding Chronic Venous Insufficiency  Problems with the veins in the legs may lead to chronic venous insufficiency (CVI). CVI means that there is a long-term problem with the veins not being able to pump blood back to your heart. When this happens, blood stays in the legs and causes swelling and aching.   Two problems that may lead to chronic venous insufficiency are:  · Damaged valves. Valves keep blood flowing from the legs through the blood vessels and back to the heart. When the valves are damaged, blood does not flow as well.   · Deep vein thrombosis (DVT). Blood clots may form in the deep veins of the legs. This may cause pain, redness, and swelling in the legs. It may also block the flow of blood back to the heart. Seek immediate medical care if you have these symptoms.  · A blood clot in the leg can also break off and travel to the lungs. This is called pulmonary embolism (PE). In the lungs, the clot can cut off the flow of blood. This may cause chest pain, trouble breathing, sweating, a fast heartbeat, coughing (may cough up blood), and fainting. It is a medical emergency and may cause death. Call 911 if you have these symptoms.  · Healthcare providers call the two conditions, DVT and PE, venous thromboembolism (VTE).  CVI cant be cured, but you can control leg swelling to reduce the likelihood of ulcers (sores).  Recognizing the symptoms  Be aware of the following:  · If you stand or sit with your feet down for long periods, your legs may ache or feel heavy.  · Swollen ankles are possibly the most common symptom of CVI.  · As swelling increases, the skin over your ankles may show red spots or a brownish tinge. The skin may feel leathery or scaly, and may start to itch.  · If swelling is not controlled, an ulcer (open wound) may form.  What you can do  Reduce your risk of developing ulcers by doing the following:  · Increase blood flow back to your heart by elevating your legs, exercising daily,  and wearing elastic stockings.  · Boost blood flow in your legs by losing excess weight.  · If you must stand or sit in one place for a period of time, keep your blood moving by wiggling your toes, shifting your body position, and rising up on the balls of your feet.    Date Last Reviewed: 5/1/2016  © 2225-2636 [employer]. [address] [place] [address], [place] [address]. All rights reserved. This information is not intended as a substitute for professional medical care. Always follow your healthcare professional's instructions.

## 2018-12-04 ENCOUNTER — DOCUMENTATION ONLY (OUTPATIENT)
Dept: RADIOLOGY | Facility: HOSPITAL | Age: 78
End: 2018-12-04

## 2018-12-04 NOTE — PROGRESS NOTES
Pt presented to clinic today with bandaged right lower extremity. Unable to perform the SMITHA due to bandages. Notified Dr. Hector. Verbal order with readback to cancel testing today. Dr. Hector wants patient to have a lower extremity venous insufficency study done in January at [facility]. Pt verbalized understanding as well as the healthcare provider from her nursing home.

## 2019-04-09 ENCOUNTER — OFFICE VISIT (OUTPATIENT)
Dept: CARDIOLOGY | Facility: CLINIC | Age: 79
End: 2019-04-09
Payer: MEDICARE

## 2019-04-09 VITALS — HEIGHT: 66 IN | SYSTOLIC BLOOD PRESSURE: 162 MMHG | BODY MASS INDEX: 27.12 KG/M2 | DIASTOLIC BLOOD PRESSURE: 58 MMHG

## 2019-04-09 DIAGNOSIS — M79.3 LIPODERMATOSCLEROSIS: ICD-10-CM

## 2019-04-09 DIAGNOSIS — I87.2 VENOUS INSUFFICIENCY OF BOTH LOWER EXTREMITIES: ICD-10-CM

## 2019-04-09 DIAGNOSIS — I95.3 HEMODIALYSIS-ASSOCIATED HYPOTENSION: ICD-10-CM

## 2019-04-09 DIAGNOSIS — Z99.2 PULMONARY HYPERTENSION ASSOCIATED WITH END-STAGE RENAL DISEASE (ESRD) ON DIALYSIS: ICD-10-CM

## 2019-04-09 DIAGNOSIS — I27.29 PULMONARY HYPERTENSION ASSOCIATED WITH END-STAGE RENAL DISEASE (ESRD) ON DIALYSIS: ICD-10-CM

## 2019-04-09 DIAGNOSIS — R60.0 BILATERAL LEG EDEMA: Primary | ICD-10-CM

## 2019-04-09 DIAGNOSIS — N18.6 PULMONARY HYPERTENSION ASSOCIATED WITH END-STAGE RENAL DISEASE (ESRD) ON DIALYSIS: ICD-10-CM

## 2019-04-09 DIAGNOSIS — N18.6 END STAGE RENAL DISEASE: ICD-10-CM

## 2019-04-09 PROCEDURE — 99999 PR PBB SHADOW E&M-EST. PATIENT-LVL III: CPT | Mod: PBBFAC,,, | Performed by: INTERNAL MEDICINE

## 2019-04-09 PROCEDURE — 99213 OFFICE O/P EST LOW 20 MIN: CPT | Mod: PBBFAC | Performed by: INTERNAL MEDICINE

## 2019-04-09 PROCEDURE — 99214 PR OFFICE/OUTPT VISIT, EST, LEVL IV, 30-39 MIN: ICD-10-PCS | Mod: S$PBB,,, | Performed by: INTERNAL MEDICINE

## 2019-04-09 PROCEDURE — 99999 PR PBB SHADOW E&M-EST. PATIENT-LVL III: ICD-10-PCS | Mod: PBBFAC,,, | Performed by: INTERNAL MEDICINE

## 2019-04-09 PROCEDURE — 99214 OFFICE O/P EST MOD 30 MIN: CPT | Mod: S$PBB,,, | Performed by: INTERNAL MEDICINE

## 2019-04-09 RX ORDER — INSULIN ASPART 100 [IU]/ML
INJECTION, SOLUTION INTRAVENOUS; SUBCUTANEOUS
COMMUNITY

## 2019-04-09 RX ORDER — HYDROXYZINE PAMOATE 25 MG/1
25 CAPSULE ORAL EVERY 6 HOURS PRN
COMMUNITY

## 2019-04-09 RX ORDER — ERGOCALCIFEROL 1.25 MG/1
1000 CAPSULE ORAL
COMMUNITY
End: 2022-03-15 | Stop reason: ALTCHOICE

## 2019-04-09 NOTE — PROGRESS NOTES
Subjective:   Patient ID:  Stephanie Wilder is a 78 y.o. female who presents for cardiac consult of Follow-up (6 month follow up )      HPI  The patient came in today for cardiac consult of Follow-up (6 month follow up )    Referring Provider: Michael Cervantes  Reason for initial consult: Leg swelling    Stephanie Wilder is a 78 y.o. female pt with current medical conditions HTN, venous insufficiency, ESRD on HD M, W,F, DM, neuropathy, anemia, dementia, gerd presents for follow up CV evaluation.     6/29/18  Pt has been having presyncopal feelings during HD. She has been having LE edema x 2 months, gradually getting worse. No SOB, but cannot lay flat, somewhat describes orthopnea. No chest pain. No palpitations. Has been on HD for 13 years. Can walk to bathroom and back. Uses wheelchair, can't walk well overall due to weakness in legs. PT has syncopal episode 12 years ago since episode she couldn't walk, no stroke or other etiology found for lack of walking.     10/23/18  Pt had 2D ECHO with normal Bi V function, Pulm HTN, mild TR. She has LE cellulitis on PO abx, has not cleared for about 2-3 months now, no fevers/chills. Compression stockings have been helpful.   ECG - NSR, RBBB, 1st deg AVB, septal infarct - old    4/9/19  LE SMITHA/venous/arterial Dopplers not completed. BP elevated today but usually very low on HD days. Overall feels well. No syncope/palpitations. Right leg with swelling more with compression stockings on. Discussed needs ultrasounds of legs soon.     Patient feels no chest pain, no sob,  no PND, no palpitation, no syncope, no CNS symptoms.    Patient is compliant with medications.    2D ECHO  CONCLUSIONS     1 - Concentric hypertrophy.     2 - No wall motion abnormalities.     3 - Normal left ventricular systolic function (EF 60-65%).     4 - Indeterminate LV diastolic function.     5 - Normal right ventricular systolic function .     6 - Pulmonary hypertension. The estimated PA systolic pressure is 45 mmHg.      7 - Mild tricuspid regurgitation.     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       Past Surgical History:   Procedure Laterality Date    CATARACT EXTRACTION      COLONOSCOPY N/A 6/4/2014    [facility] ENDO    EGD N/A 6/4/2014    [facility] ENDO    ESOPHAGOGASTRODUODENOSCOPY (EGD) N/A 2/26/2016    [facility]formed by [facility] at [facility] ENDO    MASTECTOMY Right        Social History     Tobacco Use    Smoking status: Never Smoker    Smokeless tobacco: Never Used   Substance Use Topics    Alcohol use: No    Drug use: No       Family History   Problem Relation Age of Onset    Diabetes Maternal Aunt     Glaucoma Maternal Aunt     Heart attack Maternal Aunt 80    Diabetes Paternal Aunt     Glaucoma Paternal Aunt     Diabetes Child     Heart attack Brother 79       Patient's Medications   New Prescriptions    No medications on file   Previous Medications    ACETAMINOPHEN (TYLENOL) 325 MG TABLET    Take 325 mg by mouth every 6 (six) hours as needed for Pain.    AMLODIPINE (NORVASC) 10 MG TABLET    Take 10 mg by mouth once daily.    CARVEDILOL (COREG) 25 MG TABLET    Take 25 mg by mouth 2 (two) times daily with meals.    CETIRIZINE (ZYRTEC) 10 MG TABLET    Take 10 mg by mouth once daily.    CLOTRIMAZOLE-BETAMETHASONE (LOTRISONE) CREAM        CRANBERRY FRUIT EXTRACT (CRANBERRY ORAL)    Take by mouth.    DICYCLOMINE (BENTYL) 10 MG CAPSULE    Take 10 mg by mouth 4 (four) times daily before meals and nightly.    DOCUSATE SODIUM (COLACE) 100 MG CAPSULE    Take 100 mg by mouth 2 (two) times daily.    DOXYCYCLINE  (VIBRAMYCIN) 100 MG CAP    Take 100 mg by mouth every 12 (twelve) hours.    ERGOCALCIFEROL (VITAMIN D2) 50,000 UNIT CAP    Take 1,000 Units by mouth every 7 days.    ERYTHROMYCIN (ROMYCIN) OPHTHALMIC OINTMENT    Place into the left eye every evening.    ESCITALOPRAM OXALATE (LEXAPRO) 10 MG TABLET    Take 1 tablet (10 mg total) by mouth once daily. 1 Tablet Oral Every day    ETHYL CHLORIDE 100% 100 % SPRAY    Apply topically as needed.    FERROUS SULFATE 325 MG (65 MG IRON) TAB TABLET    Take 325 mg by mouth daily with breakfast.    GABAPENTIN (NEURONTIN) 100 MG CAPSULE    Take 100 mg by mouth 3 (three) times daily.    GUAIFENESIN (MUCINEX) 600 MG 12 HR TABLET    Take 1,200 mg by mouth 2 (two) times daily.    HYDRALAZINE (APRESOLINE) 50 MG TABLET    Take 50 mg by mouth 3 (three) times daily.    HYDROCORTISONE (PREPARATION H HYDROCORTISONE) 1 % CREAM    Apply topically 2 (two) times daily.    HYDROXYZINE PAMOATE (VISTARIL) 25 MG CAP    Take 25 mg by mouth nightly.    INSULIN ASP PRT-INSULIN ASPART (NOVOLOG MIX 70-30) 100 UNIT/ML (70-30) SOLN    Sliding scale    INSULIN ASPART U-100 (NOVOLOG) 100 UNIT/ML INJECTION    Inject into the skin 3 (three) times daily before meals.    INSULIN DETEMIR (LEVEMIR) 100 UNIT/ML INJECTION    Inject 12 Units into the skin once daily.    LISINOPRIL (PRINIVIL,ZESTRIL) 40 MG TABLET    Take 40 mg by mouth once daily.    LOPERAMIDE (IMODIUM) 2 MG CAPSULE    Take 2 mg by mouth 4 (four) times daily as needed for Diarrhea.    MIDODRINE (PROAMATINE) 5 MG TAB    Take 2.5 mg by mouth 2 (two) times daily with meals.    MIRTAZAPINE (REMERON) 30 MG TABLET    Take 30 mg by mouth every evening.    NEOMYCIN-POLYMYXIN-DEXAMETHASONE (DEXACINE) 3.5 MG/G-10,000 UNIT/G-0.1 % OINT    every 6 (six) hours.    NEOMYCIN-POLYMYXIN-DEXAMETHASONE (MAXITROL) 3.5MG/ML-10,000 UNIT/ML-0.1 % DRPS    Place 1 drop into the right eye 4 (four) times daily. USE 1 DROP 4 TIMES A DAY FOR 1 WEEK INTO THE AFFECTED EYE     "OLANZAPINE (ZYPREXA) 5 MG TABLET    Take 5 mg by mouth every evening.    ONDANSETRON (ZOFRAN) 4 MG TABLET    Take 4 mg by mouth every 8 (eight) hours as needed for Nausea.    PANTOPRAZOLE (PROTONIX) 40 MG TABLET    Take 1 tablet (40 mg total) by mouth 2 (two) times daily.    -PROPYLENE GLYCOL, PF, (SYSTANE ULTRA, PF,) 0.4-0.3 % DPET    Apply to eye.    POLYETHYLENE GLYCOL (GLYCOLAX) 17 GRAM PWPK    Take by mouth.    SEVELAMER CARBONATE (RENVELA) 800 MG TAB    Take 800 mg by mouth 3 (three) times daily with meals.    TRAMADOL (ULTRAM) 50 MG TABLET    Take 50 mg by mouth every 6 (six) hours as needed for Pain.   Modified Medications    No medications on file   Discontinued Medications    No medications on file       Review of Systems   Constitutional: Negative.    HENT: Negative.    Eyes: Negative.    Respiratory: Negative.    Cardiovascular: Positive for leg swelling.   Gastrointestinal: Negative.    Genitourinary: Negative.    Musculoskeletal: Negative.    Skin: Negative.    Neurological: Positive for focal weakness.   Endo/Heme/Allergies: Negative.    Psychiatric/Behavioral: Negative.    All 12 systems otherwise negative.      Wt Readings from Last 3 Encounters:   10/23/18 76.2 kg (168 lb)   10/02/18 76.2 kg (168 lb)   08/28/18 66 kg (145 lb 8 oz)     Temp Readings from Last 3 Encounters:   06/22/16 98.6 °F (37 °C) (Oral)   04/11/16 98.9 °F (37.2 °C) (Oral)   03/04/16 98.9 °F (37.2 °C) (Axillary)     BP Readings from Last 3 Encounters:   04/09/19 (!) 162/58   10/23/18 (!) 146/88   10/02/18 (!) 140/73     Pulse Readings from Last 3 Encounters:   10/23/18 80   10/02/18 73   08/28/18 74       BP (!) 162/58 (BP Location: Right arm, Patient Position: Sitting, BP Method: Medium (Manual))   Ht 5' 6" (1.676 m)   BMI 27.12 kg/m²     Objective:   Physical Exam   Constitutional: She is oriented to person, place, and time. She appears well-developed and well-nourished. No distress.   HENT:   Head: Normocephalic and " atraumatic.   Nose: Nose normal.   Mouth/Throat: Oropharynx is clear and moist.   Eyes: Conjunctivae and EOM are normal. No scleral icterus.   Neck: Normal range of motion. Neck supple. No JVD present. No thyromegaly present.   Cardiovascular: Normal rate, regular rhythm, S1 normal and S2 normal. Exam reveals no gallop, no S3, no S4 and no friction rub.   Murmur heard.   Midsystolic murmur is present with a grade of 2/6 at the upper right sternal border.  Pulmonary/Chest: Effort normal and breath sounds normal. No stridor. No respiratory distress. She has no wheezes. She has no rales. She exhibits no tenderness.   Abdominal: Soft. Bowel sounds are normal. She exhibits no distension and no mass. There is no tenderness. There is no rebound.   Genitourinary:   Genitourinary Comments: Deferred   Musculoskeletal: Normal range of motion. She exhibits edema (1+). She exhibits no tenderness or deformity.   Lymphadenopathy:     She has no cervical adenopathy.   Neurological: She is alert and oriented to person, place, and time. She exhibits normal muscle tone. Coordination normal.   Skin: Skin is warm and dry. Rash (lipodermatosclerosis on B/L LE) noted. She is not diaphoretic. No erythema. No pallor.   Psychiatric: She has a normal mood and affect. Her behavior is normal. Judgment and thought content normal.   Nursing note and vitals reviewed.      Lab Results   Component Value Date     08/02/2018    K 4.2 08/02/2018     08/02/2018    CO2 28 08/02/2018    BUN 32 (H) 08/02/2018    CREATININE 4.5 (H) 08/02/2018     (H) 08/02/2018    HGBA1C 6.2 02/27/2016    MG 2.0 02/27/2016    AST 11 08/02/2018    ALT 9 (L) 08/02/2018    ALBUMIN 3.2 (L) 08/02/2018    PROT 7.6 08/02/2018    BILITOT 0.5 08/02/2018    WBC 4.46 08/02/2018    HGB 10.2 (L) 08/02/2018    HCT 34.7 (L) 08/02/2018    HCT 38 02/26/2016     (H) 08/02/2018     (L) 08/02/2018    INR 1.1 02/26/2016    TSH <0.010 (L) 04/13/2016      (H) 08/02/2018     Assessment:      1. Hemodialysis-associated hypotension    2. Venous insufficiency of both lower extremities    3. Lipodermatosclerosis    4. Pulmonary hypertension associated with end-stage renal disease (ESRD) on dialysis    5. End stage renal disease    6. Bilateral leg edema        Plan:   1. LE edema sec Venous insufficiency  - rec compression stockings daily  - low salt diet  - r/o PVD - LE arterial u/s with SMITHA ordered  - LE venous u/s    2. HTN/hypotension with HD  - cont meds per nephrology    3. ESRD  - cont HD  - compression stockings will increase venous return to allow more volume removal with HD    4. Pulm HTN  - cont HD and f/u with pulm      Thank you for allowing me to participate in this patient's care. Please do not hesitate to contact me with any questions or concerns. Consult note has been forwarded to the referral physician.

## 2019-04-09 NOTE — PATIENT INSTRUCTIONS
Understanding Chronic Venous Insufficiency  Problems with the veins in the legs may lead to chronic venous insufficiency (CVI). CVI means that there is a long-term problem with the veins not being able to pump blood back to your heart. When this happens, blood stays in the legs and causes swelling and aching.   Two problems that may lead to chronic venous insufficiency are:  · Damaged valves. Valves keep blood flowing from the legs through the blood vessels and back to the heart. When the valves are damaged, blood does not flow as well.   · Deep vein thrombosis (DVT). Blood clots may form in the deep veins of the legs. This may cause pain, redness, and swelling in the legs. It may also block the flow of blood back to the heart. Seek immediate medical care if you have these symptoms.  · A blood clot in the leg can also break off and travel to the lungs. This is called pulmonary embolism (PE). In the lungs, the clot can cut off the flow of blood. This may cause chest pain, trouble breathing, sweating, a fast heartbeat, coughing (may cough up blood), and fainting. It is a medical emergency and may cause death. Call 911 if you have these symptoms.  · Healthcare providers call the two conditions, DVT and PE, venous thromboembolism (VTE).  CVI cant be cured, but you can control leg swelling to reduce the likelihood of ulcers (sores).  Recognizing the symptoms  Be aware of the following:  · If you stand or sit with your feet down for long periods, your legs may ache or feel heavy.  · Swollen ankles are possibly the most common symptom of CVI.  · As swelling increases, the skin over your ankles may show red spots or a brownish tinge. The skin may feel leathery or scaly, and may start to itch.  · If swelling is not controlled, an ulcer (open wound) may form.  What you can do  Reduce your risk of developing ulcers by doing the following:  · Increase blood flow back to your heart by elevating your legs, exercising daily,  and wearing elastic stockings.  · Boost blood flow in your legs by losing excess weight.  · If you must stand or sit in one place for a period of time, keep your blood moving by wiggling your toes, shifting your body position, and rising up on the balls of your feet.    Date Last Reviewed: 5/1/2016  © 7732-8118 [employer]. [address] [place] [address], [place] [address]. All rights reserved. This information is not intended as a substitute for professional medical care. Always follow your healthcare professional's instructions.

## 2019-04-30 ENCOUNTER — CLINICAL SUPPORT (OUTPATIENT)
Dept: CARDIOLOGY | Facility: CLINIC | Age: 79
End: 2019-04-30
Attending: INTERNAL MEDICINE
Payer: MEDICARE

## 2019-04-30 ENCOUNTER — DOCUMENTATION ONLY (OUTPATIENT)
Dept: CARDIOLOGY | Facility: CLINIC | Age: 79
End: 2019-04-30

## 2019-04-30 DIAGNOSIS — I87.2 VENOUS INSUFFICIENCY OF BOTH LOWER EXTREMITIES: ICD-10-CM

## 2019-04-30 DIAGNOSIS — R60.0 BILATERAL LEG EDEMA: ICD-10-CM

## 2019-04-30 DIAGNOSIS — M79.3 LIPODERMATOSCLEROSIS: ICD-10-CM

## 2019-04-30 LAB — VASCULAR ANKLE BRACHIAL INDEX (ABI) RIGHT: 0.8 (ref 0.9–1.2)

## 2019-04-30 PROCEDURE — 93922 CAR US ANKLE BRACHIAL INDICES EXT LTD WO STR: ICD-10-PCS | Mod: 26,S$PBB,, | Performed by: INTERNAL MEDICINE

## 2019-04-30 PROCEDURE — 93922 UPR/L XTREMITY ART 2 LEVELS: CPT | Mod: PBBFAC,PN | Performed by: INTERNAL MEDICINE

## 2019-04-30 NOTE — PROGRESS NOTES
Patient presented to clinic today for LEV, DAVID and SMITHA.  Attempted the LEV, after two compressions she said she could not take the pressure.  I asked if I could just scan her legs without doing any compressions to which she replied that just the probe going up and down her thigh was painful and she said she was ready to go.  The DAVID and LEV both were cx.  She did allow me to do her SMITHA stating that her lower legs were not as sensitive as her upper legs.  Of note there was a patent dialysis graft in the right thigh.

## 2019-04-30 NOTE — PROGRESS NOTES
SMITHA complete.  Pt refused LEV and DAVID stating that the compressions were untolerable. Appointments cx and ordering Dr notified.

## 2019-05-01 RX ORDER — ASPIRIN 81 MG/1
81 TABLET ORAL DAILY
Qty: 30 TABLET | Refills: 0 | Status: SHIPPED | OUTPATIENT
Start: 2019-05-01 | End: 2022-03-15

## 2019-05-01 RX ORDER — PRAVASTATIN SODIUM 40 MG/1
40 TABLET ORAL DAILY
Qty: 90 TABLET | Refills: 1 | Status: SHIPPED | OUTPATIENT
Start: 2019-05-01 | End: 2020-06-16

## 2019-05-03 ENCOUNTER — TELEPHONE (OUTPATIENT)
Dept: CARDIOLOGY | Facility: CLINIC | Age: 79
End: 2019-05-03

## 2019-05-03 NOTE — TELEPHONE ENCOUNTER
Called to patient to give results of echo and medication orders--spoke with Ritu who says this is [facility] where patient currently resides--informed Ritu need to fax test results and medication orders--was given fax number [phone]

## 2019-05-03 NOTE — TELEPHONE ENCOUNTER
----- Message from Nigel Hector MD sent at 5/1/2019 10:07 AM CDT -----  Please call the patient regarding her abnormal result. Mild disease in leg arteries, start aspirin 81mg daily and pravastatin for cholesterol. Follow up as scheduled to discuss further testing if needed.

## 2019-05-09 ENCOUNTER — LAB VISIT (OUTPATIENT)
Dept: LAB | Facility: HOSPITAL | Age: 79
End: 2019-05-09
Attending: INTERNAL MEDICINE
Payer: MEDICARE

## 2019-05-09 ENCOUNTER — INITIAL CONSULT (OUTPATIENT)
Dept: RHEUMATOLOGY | Facility: CLINIC | Age: 79
End: 2019-05-09
Payer: MEDICARE

## 2019-05-09 VITALS
SYSTOLIC BLOOD PRESSURE: 150 MMHG | WEIGHT: 170 LBS | DIASTOLIC BLOOD PRESSURE: 72 MMHG | HEIGHT: 66 IN | BODY MASS INDEX: 27.32 KG/M2

## 2019-05-09 DIAGNOSIS — R21 RASH: ICD-10-CM

## 2019-05-09 DIAGNOSIS — R53.83 FATIGUE, UNSPECIFIED TYPE: ICD-10-CM

## 2019-05-09 DIAGNOSIS — R21 RASH: Primary | ICD-10-CM

## 2019-05-09 DIAGNOSIS — M67.929 BICEPS TENDINOPATHY, UNSPECIFIED LATERALITY: ICD-10-CM

## 2019-05-09 DIAGNOSIS — J84.9 ILD (INTERSTITIAL LUNG DISEASE): ICD-10-CM

## 2019-05-09 DIAGNOSIS — Z71.89 COUNSELING ON HEALTH PROMOTION AND DISEASE PREVENTION: ICD-10-CM

## 2019-05-09 LAB
ALBUMIN SERPL BCP-MCNC: 3.4 G/DL (ref 3.5–5.2)
ALP SERPL-CCNC: 65 U/L (ref 55–135)
ALT SERPL W/O P-5'-P-CCNC: 10 U/L (ref 10–44)
ANION GAP SERPL CALC-SCNC: 9 MMOL/L (ref 8–16)
AST SERPL-CCNC: 12 U/L (ref 10–40)
BILIRUB SERPL-MCNC: 0.3 MG/DL (ref 0.1–1)
BUN SERPL-MCNC: 21 MG/DL (ref 8–23)
C3 SERPL-MCNC: 108 MG/DL (ref 50–180)
C4 SERPL-MCNC: 29 MG/DL (ref 11–44)
CALCIUM SERPL-MCNC: 9.7 MG/DL (ref 8.7–10.5)
CHLORIDE SERPL-SCNC: 98 MMOL/L (ref 95–110)
CO2 SERPL-SCNC: 29 MMOL/L (ref 23–29)
CREAT SERPL-MCNC: 4.3 MG/DL (ref 0.5–1.4)
EST. GFR  (AFRICAN AMERICAN): 10.8 ML/MIN/1.73 M^2
EST. GFR  (NON AFRICAN AMERICAN): 9.4 ML/MIN/1.73 M^2
GLUCOSE SERPL-MCNC: 98 MG/DL (ref 70–110)
PHOSPHATE SERPL-MCNC: 3.3 MG/DL (ref 2.7–4.5)
POTASSIUM SERPL-SCNC: 4.6 MMOL/L (ref 3.5–5.1)
PROT SERPL-MCNC: 8 G/DL (ref 6–8.4)
PTH-INTACT SERPL-MCNC: 244 PG/ML (ref 9–77)
SODIUM SERPL-SCNC: 136 MMOL/L (ref 136–145)

## 2019-05-09 PROCEDURE — 99999 PR PBB SHADOW E&M-EST. PATIENT-LVL III: ICD-10-PCS | Mod: PBBFAC,,, | Performed by: INTERNAL MEDICINE

## 2019-05-09 PROCEDURE — 80053 COMPREHEN METABOLIC PANEL: CPT

## 2019-05-09 PROCEDURE — 99999 PR PBB SHADOW E&M-EST. PATIENT-LVL III: CPT | Mod: PBBFAC,,, | Performed by: INTERNAL MEDICINE

## 2019-05-09 PROCEDURE — 80074 ACUTE HEPATITIS PANEL: CPT

## 2019-05-09 PROCEDURE — 36415 COLL VENOUS BLD VENIPUNCTURE: CPT

## 2019-05-09 PROCEDURE — 99205 OFFICE O/P NEW HI 60 MIN: CPT | Mod: S$PBB,,, | Performed by: INTERNAL MEDICINE

## 2019-05-09 PROCEDURE — 86225 DNA ANTIBODY NATIVE: CPT

## 2019-05-09 PROCEDURE — 99205 PR OFFICE/OUTPT VISIT, NEW, LEVL V, 60-74 MIN: ICD-10-PCS | Mod: S$PBB,,, | Performed by: INTERNAL MEDICINE

## 2019-05-09 PROCEDURE — 86160 COMPLEMENT ANTIGEN: CPT | Mod: 59

## 2019-05-09 PROCEDURE — 86160 COMPLEMENT ANTIGEN: CPT

## 2019-05-09 PROCEDURE — 99213 OFFICE O/P EST LOW 20 MIN: CPT | Mod: PBBFAC,PN | Performed by: INTERNAL MEDICINE

## 2019-05-09 PROCEDURE — 84100 ASSAY OF PHOSPHORUS: CPT

## 2019-05-09 PROCEDURE — 83970 ASSAY OF PARATHORMONE: CPT

## 2019-05-09 NOTE — PATIENT INSTRUCTIONS
Understanding Rotator Cuff Tendonitis    Tendons are tough tissues that connect muscles to bone. A group of 4 muscles and their tendons form a cuff around the head of the upper arm bone. This is called the rotator cuff. It connects the upper arm to the shoulder blade. It gives the shoulder joint stability and strength.  If tendons are injured or strained, they may become irritated and swollen (inflamed). This is called tendonitis. Rotator cuff tendonitis may cause shoulder pain and loss of function.  What causes rotator cuff tendonitis?  Tendonitis results when the rotator cuff tendons are injured or overworked. The most common cause of injury is repetitive overhead activities. These can be work-related activities such as reaching, pushing, or lifting. Or they can be sports-related activities such as throwing, swimming, or lifting weights.  Symptoms of rotator cuff tendonitis  Pain on the side of the upper arm is the most common symptom. Pain may get worse with overhead movements or when you raise the arm above shoulder level. It may also hurt to lie on the shoulder at night.  Treatment for rotator cuff tendonitis  Treatment may include the following:  · Active rest. This lets the rotator cuff heal. Active rest means using your arm and shoulder, but avoiding activities that cause pain, such as reaching overhead or sleeping on the shoulder.  · Cold packs. Putting ice packs on the shoulder helps reduce swelling and relieve pain.  · Pain medicines. Prescription or over-the-counter pain medicines can help relieve pain and swelling.  · Arm and shoulder exercises. These help keep the shoulder joint mobile as it heals. They also help improve the strength of muscles around the joint.  Possible complications  It might be tempting to stop using your shoulder completely to avoid pain. But doing so may lead to a condition called frozen shoulder. To help prevent this, following instructions you are given for active rest  and for doing exercises to help your shoulder heal.  When to call your healthcare provider  Call your healthcare provider right away if you have any of these:  · Fever of 100.4°F (38°C) or higher, or as directed  · Symptoms that dont get better, or get worse  · New symptoms   Date Last Reviewed: 3/10/2016  © 8636-5237 [employer]. [address] [place] [address]. All rights reserved. This information is not intended as a substitute for professional medical care. Always follow your healthcare professional's instructions.        Understanding Biceps Tendonitis    A tendon is a strong band of tissue that connects muscle to bone. The biceps muscle is in the front of the upper arm. It helps with movements such as bending the elbow or raising the arm. The upper end of the biceps muscle is called the proximal end. It has two tendons called the long head and the short head. These tendons attach the muscle to the bones in the shoulder. Biceps tendonitis occurs when either of these tendons is irritated or red and swollen (inflamed). Most cases involve the long head.  Causes of biceps tendonitis  Causes can include:  · Wear and tear of the tendon from aging or normal use over time  · Overuse of the tendon from sports or work activities, especially those that involve repeated overhead movements  · Injury to the tendon from a fall or other accident  · Other problems in the shoulder, such as shoulder impingement or a rotator cuff tear  Symptoms of biceps tendonitis  Common symptoms include:  · Pain in the front of the shoulder that may also travel down the arm. The pain may be worse with activity and at night.  · Swelling in the shoulder  · Clicking or catching sensation when using the arm and shoulder  · Trouble moving the arm and shoulder  Treating biceps tendonitis  Treatment for biceps tendonitis may include:  · Resting the arm and shoulder. This involves limiting certain movements, such as reaching  above the head or raising the arm. These can slow healing and make symptoms worse. You may also need to limit certain sports and types of work for a time.  · Cold therapy. This involves using items such as ice packs to help relieve symptoms. Cold can help reduce pain and swelling.  · Medicines. These help relieve pain and swelling. NSAIDs (nonsteroidal anti-inflammatory drugs) are the most common medicines used. Medicines may be prescribed or bought over-the-counter. They may be given as pills. Or they may be applied to the skin in the form of a gel, cream, or patch.  · Injections of medicine into the injured area. These help relieve pain and swelling for a time.  · Physical therapy and exercises.  These help improve strength and range of motion in the arm and shoulder.  Possible complications  · If the tendon isnt given time to heal, symptoms may worsen. Also, the tendon may tear (rupture).  · If the tendon ruptures or doesnt get better with treatment, your healthcare provider may recommend surgery. This most often involves repairing and reattaching the tendon.     When to call your healthcare provider  Call your healthcare provider right away if you have any of these:  · Fever of 100.4°F (38°C) or higher, or as directed  · Symptoms that dont get better with treatment, or get worse  · Weakness or instability in the arm or shoulder  · Sudden sharp pain, bruising, swelling, popping or snapping sensation, or bulge in the upper arm or shoulder  · New symptoms   Date Last Reviewed: 3/10/2016  © 6328-4656 [employer]. [address] [place] [address]. All rights reserved. This information is not intended as a substitute for professional medical care. Always follow your healthcare professional's instructions.        Shoulder External Rotation, Isometric (Strength)    1. Bend your right arm in front of your body, palm up. Hold your right wrist with your left hand.  2. Try to push your right arm  outward, while pulling back with your left arm. Try not to let either arm move. Push and pull both arms firmly in opposite directions.  3. Hold for 5 seconds. Then relax.  4. Repeat 5 times.  5. Repeat this exercise 3 times a day, or as instructed.  Date Last Reviewed: 3/10/2016  © 6538-2598 [employer]. [address] [place] [address]. All rights reserved. This information is not intended as a substitute for professional medical care. Always follow your healthcare professional's instructions.        Shoulder Internal Rotation, Isometric (Strength)    6. Bend your right arm in front of your body, palm up.  Hold your wrist with your left hand.   7. Try to push your right arm inward, while pulling back with your left arm.  Try not to let either arm move.  Push and pull both arms firmly in opposite directions.  8. Hold for 5 seconds. Then relax.  9. Repeat 5 times.  10. Repeat this exercise 3 times a day, or as instructed.  Date Last Reviewed: 3/10/2016  © 9675-8755 [employer]. [address] [place] [address]. All rights reserved. This information is not intended as a substitute for professional medical care. Always follow your healthcare professional's instructions.        Shoulder Flexion (Flexibility)    11. Sit in a chair sideways next to a table. Lay your right arm on the table, pointed forward.  12. Slowly lean forward.  Slide your arm forward on the table. Feel the stretch in your right shoulder.  13. Hold for 5 seconds. Slowly sit back up.  14. Repeat 5 times.  15. Repeat this exercise 3 times a day, or as instructed.  Date Last Reviewed: 3/10/2016  © 1618-8137 [employer]. [address] [place] [address]. All rights reserved. This information is not intended as a substitute for professional medical care. Always follow your healthcare professional's instructions.        Pendulum (Flexibility)    16. Lean over next to a table, with your  left arm supporting your weight on the table.  17. Relax your right arm and let it hang straight down.  18. Slowly begin to swing your right arm in a small [place]. Gradually make the [place] bigger if you can. Change direction after 1 minute of motion.  19. Next, swing your right arm backward and forward. Then move it side to side. Change direction after 1 minute of motion.  20. Repeat these movements for about 5 minutes.  21. Do this exercise 3 times a day, or as often as instructed.  Date Last Reviewed: 3/10/2016  © 4343-6552 [employer]. [address] [place] [id number]. All rights reserved. This information is not intended as a substitute for professional medical care. Always follow your healthcare professional's instructions.

## 2019-05-09 NOTE — PROGRESS NOTES
RHEUMATOLOGY OUTPATIENT CLINIC NOTE    5/9/2019    Attending Rheumatologist: Oneil Aldana  Primary Care Provider: Garry Vazquez MD   Physician Requesting Consultation: Michael Cervantes MD  [address] [facility] [place] [place] [address]  Chief Complaint/Reason For Consultation:  Disease Management    Subjective:       CAPRICE Wilder is a 78 y.o. Black or  female with skin thickening and rash refers for rheumatic evaluation.  The patient reports having does induration her posterior lower extremities for several months.  They are initially of the painful temp come into rated and painless.  She has a past medical history of end-stage renal disease on hemodialysis.  Denies having any MRI with contrast performed previously.  Denies any particular nodule at this time or oozing from lesions.  She has past medical history of end-stage renal disease on dialysis for >10 years.  Main complaint today is intermittent left shoulder pain.  It worsened with range of motion, aggravated by lying on affected side.  Denies any trauma or fall.  Able to perform activities of daily living without any particular difficulty.  She denies having any other joint pain or swelling.  Does not have any discoloration fingertips upon cold exposure.  No rash or skin thickening otherwise, denies photosensitivity.  Denies shortness of breath or cough despite history of ILD and pHTN.  Denies sicca symptoms, current numbness,  or GI complaints.    Review of Systems   Constitutional: Negative for chills, fever, malaise/fatigue and weight loss.   HENT:        Denies eye or mouth sicca symptoms, denies oral ulcers, denies parotid swelling, denies swollen glands.   Eyes: Negative for blurred vision, pain and redness.   Respiratory: Negative for cough, hemoptysis, sputum production and shortness of breath.    Cardiovascular: Negative for chest pain, orthopnea, leg swelling and PND.   Gastrointestinal: Negative for abdominal pain, blood  in stool, constipation, diarrhea and heartburn.        Denies dysphagia.   Genitourinary: Negative for dysuria and hematuria.        Denies genital ulcers or sicca symptoms, denies foaming of the urine, denies nephrolithiasis.   Musculoskeletal: Positive for joint pain (Left shoulder, mechanical pattern.). Negative for back pain, myalgias and neck pain.   Skin: Positive for rash (Lower extremities, Hx of PVD).        Denies photosensitivity, denies alopecia, denies sclerodactyly, denies Raynaud's phenomenon,denies digital ulcers, no psoriasis, no purpura, denies nodules.   Neurological: Negative for tingling, sensory change, focal weakness, seizures, weakness and headaches.        Denies transient ischemic attack, denies strokes, denies seizures   Endo/Heme/Allergies: Does not bruise/bleed easily.        Denies fetal loss,frequent infections, denies deep vein thrombosis or pulmonary embolism.   Psychiatric/Behavioral: Negative for substance abuse. The patient does not have insomnia.    All other systems reviewed and are negative.      Chronic comorbid conditions affecting medical decision making today:  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     Past Surgical History:   Procedure Laterality Date    CATARACT EXTRACTION      COLONOSCOPY N/A 6/4/2014    Performed [facility]    EGD N/A 6/4/2014    Performed by [facility] at [facility] ENDO    ESOPHAGOGASTRODUODENOSCOPY (EGD) N/A 2/26/2016    Performed by Darren Manning MD at [facility] ENDO    MASTECTOMY Right      Family History   Problem Relation Age of Onset    Diabetes Maternal Aunt     Glaucoma Maternal Aunt      Heart attack Maternal Aunt 80    Diabetes Paternal Aunt     Glaucoma Paternal Aunt     Diabetes Child     Heart attack Brother 79     Social History     Substance and Sexual Activity   Alcohol Use No     Social History     Tobacco Use   Smoking Status Never Smoker   Smokeless Tobacco Never Used     Social History     Substance and Sexual Activity   Drug Use No       Current Outpatient Medications:     acetaminophen (TYLENOL) 325 MG tablet, Take 325 mg by mouth every 6 (six) hours as needed for Pain., Disp: , Rfl:     amLODIPine (NORVASC) 10 MG tablet, Take 10 mg by mouth once daily., Disp: , Rfl:     aspirin (ECOTRIN) 81 MG EC tablet, Take 1 tablet (81 mg total) by mouth once daily., Disp: 30 tablet, Rfl: 0    carvedilol (COREG) 25 MG tablet, Take 25 mg by mouth 2 (two) times daily with meals., Disp: , Rfl:     cetirizine (ZYRTEC) 10 MG tablet, Take 10 mg by mouth once daily., Disp: , Rfl:     cranberry fruit extract (CRANBERRY ORAL), Take by mouth., Disp: , Rfl:     dicyclomine (BENTYL) 10 MG capsule, Take 10 mg by mouth 4 (four) times daily before meals and nightly., Disp: , Rfl:     docusate sodium (COLACE) 100 MG capsule, Take 100 mg by mouth 2 (two) times daily., Disp: , Rfl:     ergocalciferol (VITAMIN D2) 50,000 unit Cap, Take 1,000 Units by mouth every 7 days., Disp: , Rfl:     erythromycin (ROMYCIN) ophthalmic ointment, Place into the left eye every evening., Disp: 3.5 g, Rfl: 0    escitalopram oxalate (LEXAPRO) 10 MG tablet, Take 1 tablet (10 mg total) by mouth once daily. 1 Tablet Oral Every day, Disp: 15 tablet, Rfl: 0    ferrous sulfate 325 mg (65 mg iron) Tab tablet, Take 325 mg by mouth daily with breakfast., Disp: , Rfl:     gabapentin (NEURONTIN) 100 MG capsule, Take 100 mg by mouth 3 (three) times daily., Disp: , Rfl:     guaifenesin (MUCINEX) 600 mg 12 hr tablet, Take 1,200 mg by mouth 2 (two) times daily., Disp: , Rfl:     hydrALAZINE (APRESOLINE) 50 MG tablet, Take 50 mg  by mouth 3 (three) times daily., Disp: , Rfl:     hydrocortisone (PREPARATION H HYDROCORTISONE) 1 % cream, Apply topically 2 (two) times daily., Disp: , Rfl:     hydrOXYzine pamoate (VISTARIL) 25 MG Cap, Take 25 mg by mouth nightly., Disp: , Rfl:     insulin asp prt-insulin aspart (NOVOLOG MIX 70-30) 100 unit/mL (70-30) Soln, Sliding scale, Disp: , Rfl:     insulin aspart U-100 (NOVOLOG) 100 unit/mL injection, Inject into the skin 3 (three) times daily before meals., Disp: , Rfl:     insulin detemir (LEVEMIR) 100 unit/mL injection, Inject 12 Units into the skin once daily., Disp: , Rfl:     lisinopril (PRINIVIL,ZESTRIL) 40 MG tablet, Take 40 mg by mouth once daily., Disp: , Rfl:     loperamide (IMODIUM) 2 mg capsule, Take 2 mg by mouth 4 (four) times daily as needed for Diarrhea., Disp: , Rfl:     midodrine (PROAMATINE) 5 MG Tab, Take 2.5 mg by mouth 2 (two) times daily with meals., Disp: , Rfl:     mirtazapine (REMERON) 30 MG tablet, Take 30 mg by mouth every evening., Disp: , Rfl:     neomycin-polymyxin-dexamethasone (DEXACINE) 3.5 mg/g-10,000 unit/g-0.1 % Oint, every 6 (six) hours., Disp: , Rfl:     neomycin-polymyxin-dexamethasone (MAXITROL) 3.5mg/mL-10,000 unit/mL-0.1 % DrpS, Place 1 drop into the right eye 4 (four) times daily. USE 1 DROP 4 TIMES A DAY FOR 1 WEEK INTO THE AFFECTED EYE, Disp: 5 mL, Rfl: 2    olanzapine (ZYPREXA) 5 MG tablet, Take 5 mg by mouth every evening., Disp: , Rfl:     ondansetron (ZOFRAN) 4 MG tablet, Take 4 mg by mouth every 8 (eight) hours as needed for Nausea., Disp: , Rfl:     peg 400-propylene glycol, PF, (SYSTANE ULTRA, PF,) 0.4-0.3 % Dpet, Apply to eye., Disp: , Rfl:     polyethylene glycol (GLYCOLAX) 17 gram PwPk, Take by mouth., Disp: , Rfl:     pravastatin (PRAVACHOL) 40 MG tablet, Take 1 tablet (40 mg total) by mouth once daily., Disp: 90 tablet, Rfl: 1    sevelamer carbonate (RENVELA) 800 mg Tab, Take 800 mg by mouth 3 (three) times daily with meals., Disp:  , Rfl:     traMADol (ULTRAM) 50 mg tablet, Take 50 mg by mouth every 6 (six) hours as needed for Pain., Disp: , Rfl:     clotrimazole-betamethasone (LOTRISONE) cream, , Disp: , Rfl:     doxycycline (VIBRAMYCIN) 100 MG Cap, Take 100 mg by mouth every 12 (twelve) hours., Disp: , Rfl:     ethyl chloride 100% 100 % spray, Apply topically as needed., Disp: , Rfl:     pantoprazole (PROTONIX) 40 MG tablet, Take 1 tablet (40 mg total) by mouth 2 (two) times daily., Disp: 60 tablet, Rfl: 1     Objective:         Vitals:    05/09/19 0903   BP: (!) 150/72     Physical Exam   Nursing note and vitals reviewed.  Constitutional: She is oriented to person, place, and time and well-developed, well-nourished, and in no distress. No distress.   HENT:   Head: Normocephalic.   no oral ulcers, normal pooling of saliva.  no parotid enlargement.   Eyes: Conjunctivae are normal. Pupils are equal, round, and reactive to light.   Absent Episcleritis/scleritis.   Neck: Normal range of motion.   Cardiovascular: Normal rate and intact distal pulses.    Pulmonary/Chest: Effort normal. No respiratory distress.   Abdominal: Soft. She exhibits no distension.   Neurological: She is alert and oriented to person, place, and time.   absent sensory deficits  muscle strength 4+/5 LE proximally, 5/5 through otherwise.     Reports difficulty walking for > a decade s/p syncopal episode   Skin: Rash (The parameters sclerosis.  Skin induration noted on posterior thigh areas bilaterally.  No particular nodule or calcification noted.) noted.     No teleangiectasias, discoid lesions, purpura, livedo reticularis.   Musculoskeletal: Normal range of motion. She exhibits tenderness (Bicipital tendon left.). She exhibits no edema or deformity.   : strong  No synovitis.    AROM: intact  PROM: intact    Rotator cuff maneuvers negative, Yergason's positive left.  Arm drop test negative.    Devices used by patient:  Wheelchair       Reviewed old and all outside  pertinent medical records available.    All lab results personally reviewed and interpreted by me.  Lab Results   Component Value Date    WBC 4.46 08/02/2018    HGB 10.2 (L) 08/02/2018    HCT 34.7 (L) 08/02/2018     (H) 08/02/2018    MCH 31.9 (H) 08/02/2018    MCHC 29.4 (L) 08/02/2018    RDW 16.3 (H) 08/02/2018     (L) 08/02/2018    MPV 10.0 08/02/2018     Lab Results   Component Value Date     08/02/2018    K 4.2 08/02/2018     08/02/2018    CO2 28 08/02/2018     (H) 08/02/2018    BUN 32 (H) 08/02/2018    CALCIUM 9.0 08/02/2018    PROT 7.6 08/02/2018    ALBUMIN 3.2 (L) 08/02/2018    BILITOT 0.5 08/02/2018    AST 11 08/02/2018    ALKPHOS 107 08/02/2018    ALT 9 (L) 08/02/2018     No results found for: COLORU, APPEARANCEUA, SPECGRAV, PHUR, PROTEINUA, GLUCOSEU, KETONESU, BLOODU, LEUKOCYTESUR, NITRITE, UROBILINOGEN    Lab Results   Component Value Date    CRP 86.8 (H) 08/02/2018       Lab Results   Component Value Date    SEDRATE 44 (H) 08/02/2018       Lab Results   Component Value Date    SEDRATE 44 (H) 08/02/2018       No components found for: 25OHVITDTOT, 60VKHKQT0, 99TWVVJW2, METHODNOTE    No results found for: URICACID    No components found for: TSPOTTB    Rheum Labs:  RANJIT negative  ANCA screen negative     Infectious Labs:  n/a     Imaging:  All imaging reviewed and independently  interpreted by me.    CT chest without contrast August 2018  chronic enlarged abnormal appearance to the thyroid gland, appears enlarged compared to previous study.  Extensive chronic interstitial disease of the lungs is present.  There is calcification of the pleural surface on the right as well as irregular pleural thickening.  A pattern of diffuse ground glass type increased attenuation with patchy distribution is seen within the lungs.    TTE July 2018   1 - Concentric hypertrophy.     2 - No wall motion abnormalities.     3 - Normal left ventricular systolic function (EF 60-65%).     4 -  Indeterminate LV diastolic function.     5 - Normal right ventricular systolic function .     6 - Pulmonary hypertension. The estimated PA systolic pressure is 45 mmHg.     7 - Mild tricuspid regurgitation.     Pulmonary function test: FVC / FEV1 / Ratio / TLC / DLCO  August 2018  45.6 / 52.8 / 89 / 56 / 69     ASSESSMENT / PLAN:     Stephanie Wilder is a 78 y.o. Black or  female with:    1. Rash  - unknown etiology.  Clinically without significant manifestations of overt rheumatic disorder.  - of mention, patient on chronic hydralazine.  ANCA screen negative.  Will consider repeating.  - Hx of restrictive ILD, pulmonary hypertension.  Depending on results, may consider doing scleroderma panel (Sclerodactyly absent, equivocal lower extremity weakness on exam likely due to deconditioning; Negative RANJIT, and no RP reported).  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  Recommend for blood work as below.  - will recommend for Dermatology evaluation to consider for skin biopsy on next visit.  - PTH, intact; Standing  - Comprehensive metabolic panel; Future  - Phosphorus; Standing  - C3 complement; Standing  - C4 complement; Standing  - Anti-DNA antibody, double-stranded; Standing  - Hepatitis panel, acute; Future    3. Biceps tendinopathy, unspecified laterality  - history of present illness and current findings consistent with rotator cuff/bicipital tendinopathy  - Discussed and recommended exercise range of motion, flexibility, and strengthening exercises.  - Acetaminophen prn -> standing   - Topicals therapy: Capsaicin / NSAIDs   - consider for corticosteroid injection and PT referral    3. Other specified counseling  - over 10 minutes spent regarding below topics:  - Immunization counseling done.  - Nutrition and exercise counseling.  - Regular exercise:  Range of motion and flexibility.  - Medication counseling provided.      Follow up in about 2 weeks (around 5/23/2019).    Method of  contact with patient concerns: [facility]    Disclaimer:  This note is prepared using voice recognition software and as such is likely to have errors and has not been proof read. Please contact me for questions.     Time spent: 60 minutes in face to face discussion concerning diagnosis, prognosis, review of lab and test results, benefits of treatment as well as management of disease, counseling of patient and coordination of care between various health care providers.  Greater than half the time spent was used for coordination of care and counseling of patient.    Oneil Aldana M.D.  Rheumatology Department   [facility]

## 2019-05-09 NOTE — LETTER
May 9, 2019      Micheal Cervantes MD  [address] [facility]  [place] [address]           [facility] Rheumatology  [address] [place] [address] [address] [place] [address]  Phone: [phone]  Fax: [phone]          Patient: Stephanie Wilder   MR Number: [id number]   YOB: 1941   Date of Visit: 5/9/2019       Dear Dr. Michael Cervantes:    Thank you for referring Stephanie Wilder to me for evaluation. Attached you will find relevant portions of my assessment and plan of care.    If you have questions, please do not hesitate to call me. I look forward to following Stephanie Wilder along with you.    Sincerely,    Oneil Aldana MD    Enclosure  CC:  No Recipients    If you would like to receive this communication electronically, please contact [email or web address] or [phone] to request more information on [employer] access.    For providers and/or their staff who would like to refer a patient to [facility], please contact us through our one-stop-shop provider referral line, [facility] , at [phone].    If you feel you have received this communication in error or would no longer like to receive these types of communications, please e-mail [email or web address]

## 2019-05-10 LAB
DSDNA AB SER-ACNC: NORMAL [IU]/ML
HAV IGM SERPL QL IA: NEGATIVE
HBV CORE IGM SERPL QL IA: NEGATIVE
HBV SURFACE AG SERPL QL IA: NEGATIVE
HCV AB SERPL QL IA: NEGATIVE

## 2019-06-04 ENCOUNTER — OFFICE VISIT (OUTPATIENT)
Dept: RHEUMATOLOGY | Facility: CLINIC | Age: 79
End: 2019-06-04
Payer: MEDICARE

## 2019-06-04 VITALS
HEIGHT: 66 IN | WEIGHT: 170 LBS | DIASTOLIC BLOOD PRESSURE: 71 MMHG | BODY MASS INDEX: 27.32 KG/M2 | SYSTOLIC BLOOD PRESSURE: 189 MMHG | HEART RATE: 73 BPM

## 2019-06-04 DIAGNOSIS — Z71.89 COUNSELING ON HEALTH PROMOTION AND DISEASE PREVENTION: ICD-10-CM

## 2019-06-04 DIAGNOSIS — M15.9 PRIMARY OSTEOARTHRITIS INVOLVING MULTIPLE JOINTS: ICD-10-CM

## 2019-06-04 DIAGNOSIS — R53.83 FATIGUE, UNSPECIFIED TYPE: Primary | ICD-10-CM

## 2019-06-04 PROCEDURE — 99999 PR PBB SHADOW E&M-EST. PATIENT-LVL III: ICD-10-PCS | Mod: PBBFAC,,, | Performed by: INTERNAL MEDICINE

## 2019-06-04 PROCEDURE — 99213 OFFICE O/P EST LOW 20 MIN: CPT | Mod: PBBFAC | Performed by: INTERNAL MEDICINE

## 2019-06-04 PROCEDURE — 99214 OFFICE O/P EST MOD 30 MIN: CPT | Mod: S$PBB,,, | Performed by: INTERNAL MEDICINE

## 2019-06-04 PROCEDURE — 99999 PR PBB SHADOW E&M-EST. PATIENT-LVL III: CPT | Mod: PBBFAC,,, | Performed by: INTERNAL MEDICINE

## 2019-06-04 PROCEDURE — 99214 PR OFFICE/OUTPT VISIT, EST, LEVL IV, 30-39 MIN: ICD-10-PCS | Mod: S$PBB,,, | Performed by: INTERNAL MEDICINE

## 2019-06-04 NOTE — PROGRESS NOTES
RHEUMATOLOGY OUTPATIENT CLINIC NOTE    6/4/2019    Attending Rheumatologist: Oneil Aldana  Primary Care Provider: Garry Vazquez MD   Physician Requesting Consultation: No referring provider defined for this encounter.  Chief Complaint/Reason For Consultation:  Assess for rheumatic disease.    Subjective:       CAPRICE Wilder is a 78 y.o. Black or  female with skin thickening comes for follow up.    Today  Last seen on early May.  Patient with no features of active rheumatic disorder, recommended for blood work.  Management options for shoulder/bicipital tendinopathy provided.  No acute complaints today.  Reports skin induration posterior leg remains unchanged, reports not being tender or painful.  No complaints otherwise.  Denies any new joint pain or swelling.  Denies Raynaud's phenomenon, fever,  or GI complaints.    Review of Systems   Constitutional: Negative for chills and fever.   Eyes: Negative for pain and redness.   Respiratory: Negative for cough and shortness of breath.    Cardiovascular: Negative for chest pain and leg swelling.   Gastrointestinal: Negative for blood in stool, constipation and heartburn.        Denies dysphagia.   Genitourinary: Negative for dysuria and urgency.   Musculoskeletal: Negative for back pain and joint pain (previously on Left shoulder, mechanical pattern.).   Skin: Positive for rash (Lower extremities, Hx of PVD).        Denies photosensitivity, denies alopecia, denies sclerodactyly, denies Raynaud's phenomenon,denies digital ulcers, no psoriasis, no purpura, denies nodules.   Neurological: Negative for tingling and focal weakness.   Endo/Heme/Allergies: Does not bruise/bleed easily.   Psychiatric/Behavioral: Negative for memory loss. The patient does not have insomnia.    All other systems reviewed and are negative.    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     Past Surgical History:   Procedure Laterality Date    CATARACT EXTRACTION      COLONOSCOPY N/A 6/4/2014    Performed by Darrell Sanchez III, [facility] at [facility]    EGD N/A 6/4/2014    Performed by Darrell Sanchez III, MD at [facility]    ESOPHAGOGASTRODUODENOSCOPY (EGD) N/A 2/26/2016    Performed by Darren Manning MD at [facility]    MASTECTOMY Right      Family History   Problem Relation Age of Onset    Diabetes Maternal Aunt     Glaucoma Maternal Aunt     Heart attack Maternal Aunt 80    Diabetes Paternal Aunt     Glaucoma Paternal Aunt     Diabetes Child     Heart attack Brother 79     Social History     Substance and Sexual Activity   Alcohol Use No     Social History     Tobacco Use   Smoking Status Never Smoker   Smokeless Tobacco Never Used     Social History     Substance and Sexual Activity   Drug Use No       Current Outpatient Medications:     acetaminophen (TYLENOL) 325 MG tablet, Take 325 mg by mouth every 6 (six) hours as needed for Pain., Disp: , Rfl:     amLODIPine (NORVASC) 10 MG tablet, Take 10 mg by mouth once daily., Disp: , Rfl:     aspirin (ECOTRIN) 81 MG EC tablet, Take 1 tablet (81 mg total) by mouth once daily., Disp: 30 tablet, Rfl: 0    carvedilol (COREG) 25 MG tablet, Take 25 mg by mouth 2 (two) times daily with meals., Disp: , Rfl:     cetirizine (ZYRTEC) 10 MG tablet, Take 10 mg by mouth once daily., Disp: , Rfl:     clotrimazole-betamethasone (LOTRISONE) cream, , Disp: , Rfl:     cranberry fruit extract (CRANBERRY ORAL), Take by mouth., Disp: , Rfl:     dicyclomine (BENTYL) 10 MG capsule, Take 10 mg by mouth 4 (four) times daily before meals and nightly., Disp: ,  Rfl:     docusate sodium (COLACE) 100 MG capsule, Take 100 mg by mouth 2 (two) times daily., Disp: , Rfl:     doxycycline (VIBRAMYCIN) 100 MG Cap, Take 100 mg by mouth every 12 (twelve) hours., Disp: , Rfl:     ergocalciferol (VITAMIN D2) 50,000 unit Cap, Take 1,000 Units by mouth every 7 days., Disp: , Rfl:     erythromycin (ROMYCIN) ophthalmic ointment, Place into the left eye every evening., Disp: 3.5 g, Rfl: 0    escitalopram oxalate (LEXAPRO) 10 MG tablet, Take 1 tablet (10 mg total) by mouth once daily. 1 Tablet Oral Every day, Disp: 15 tablet, Rfl: 0    ethyl chloride 100% 100 % spray, Apply topically as needed., Disp: , Rfl:     ferrous sulfate 325 mg (65 mg iron) Tab tablet, Take 325 mg by mouth daily with breakfast., Disp: , Rfl:     gabapentin (NEURONTIN) 100 MG capsule, Take 100 mg by mouth 3 (three) times daily., Disp: , Rfl:     guaifenesin (MUCINEX) 600 mg 12 hr tablet, Take 1,200 mg by mouth 2 (two) times daily., Disp: , Rfl:     hydrALAZINE (APRESOLINE) 50 MG tablet, Take 50 mg by mouth 3 (three) times daily., Disp: , Rfl:     hydrocortisone (PREPARATION H HYDROCORTISONE) 1 % cream, Apply topically 2 (two) times daily., Disp: , Rfl:     hydrOXYzine pamoate (VISTARIL) 25 MG Cap, Take 25 mg by mouth nightly., Disp: , Rfl:     insulin asp prt-insulin aspart (NOVOLOG MIX 70-30) 100 unit/mL (70-30) Soln, Sliding scale, Disp: , Rfl:     insulin aspart U-100 (NOVOLOG) 100 unit/mL injection, Inject into the skin 3 (three) times daily before meals., Disp: , Rfl:     insulin detemir (LEVEMIR) 100 unit/mL injection, Inject 12 Units into the skin once daily., Disp: , Rfl:     lisinopril (PRINIVIL,ZESTRIL) 40 MG tablet, Take 40 mg by mouth once daily., Disp: , Rfl:     loperamide (IMODIUM) 2 mg capsule, Take 2 mg by mouth 4 (four) times daily as needed for Diarrhea., Disp: , Rfl:     midodrine (PROAMATINE) 5 MG Tab, Take 2.5 mg by mouth 2 (two) times daily with meals., Disp: , Rfl:      mirtazapine (REMERON) 30 MG tablet, Take 30 mg by mouth every evening., Disp: , Rfl:     neomycin-polymyxin-dexamethasone (DEXACINE) 3.5 mg/g-10,000 unit/g-0.1 % Oint, every 6 (six) hours., Disp: , Rfl:     neomycin-polymyxin-dexamethasone (MAXITROL) 3.5mg/mL-10,000 unit/mL-0.1 % DrpS, Place 1 drop into the right eye 4 (four) times daily. USE 1 DROP 4 TIMES A DAY FOR 1 WEEK INTO THE AFFECTED EYE, Disp: 5 mL, Rfl: 2    olanzapine (ZYPREXA) 5 MG tablet, Take 5 mg by mouth every evening., Disp: , Rfl:     ondansetron (ZOFRAN) 4 MG tablet, Take 4 mg by mouth every 8 (eight) hours as needed for Nausea., Disp: , Rfl:     peg 400-propylene glycol, PF, (SYSTANE ULTRA, PF,) 0.4-0.3 % Dpet, Apply to eye., Disp: , Rfl:     polyethylene glycol (GLYCOLAX) 17 gram PwPk, Take by mouth., Disp: , Rfl:     pravastatin (PRAVACHOL) 40 MG tablet, Take 1 tablet (40 mg total) by mouth once daily., Disp: 90 tablet, Rfl: 1    sevelamer carbonate (RENVELA) 800 mg Tab, Take 800 mg by mouth 3 (three) times daily with meals., Disp: , Rfl:     traMADol (ULTRAM) 50 mg tablet, Take 50 mg by mouth every 6 (six) hours as needed for Pain., Disp: , Rfl:     pantoprazole (PROTONIX) 40 MG tablet, Take 1 tablet (40 mg total) by mouth 2 (two) times daily., Disp: 60 tablet, Rfl: 1     Objective:         Vitals:    06/04/19 1353   BP: (!) 189/71   Pulse: 73     Physical Exam   Nursing note and vitals reviewed.  Constitutional: She is oriented to person, place, and time and well-developed, well-nourished, and in no distress. No distress.   HENT:   Head: Normocephalic.   no oral ulcers, normal pooling of saliva.  no parotid enlargement.   Eyes: Conjunctivae are normal. Pupils are equal, round, and reactive to light.   Absent Episcleritis/scleritis.   Neck: Normal range of motion.   Cardiovascular: Normal rate and intact distal pulses.    Pulmonary/Chest: Effort normal. No respiratory distress.   Abdominal: Soft. She exhibits no distension.    Neurological: She is alert and oriented to person, place, and time.   absent sensory deficits  muscle strength 4+/5 LE proximally, 5/5 through otherwise.     Reports difficulty walking for > a decade s/p syncopal episode   Skin: Rash (+Lipodermatosclerosis.  Skin induration noted on posterior thigh areas bilaterally.  No particular nodule or calcification on my exam.) noted. No erythema.     Musculoskeletal: Normal range of motion. She exhibits tenderness (Bicipital tendon left.). She exhibits no edema or deformity.   : strong  No synovitis.    AROM: intact  PROM: intact    Rotator cuff maneuvers negative, Yergason's positive left.  Arm drop test negative.    Devices used by patient:  Wheelchair       Reviewed old and all outside pertinent medical records available.    All lab results personally reviewed and interpreted by me.  Lab Results   Component Value Date    WBC 4.46 08/02/2018    HGB 10.2 (L) 08/02/2018    HCT 34.7 (L) 08/02/2018     (H) 08/02/2018    MCH 31.9 (H) 08/02/2018    MCHC 29.4 (L) 08/02/2018    RDW 16.3 (H) 08/02/2018     (L) 08/02/2018    MPV 10.0 08/02/2018     Lab Results   Component Value Date     05/09/2019    K 4.6 05/09/2019    CL 98 05/09/2019    CO2 29 05/09/2019    GLU 98 05/09/2019    BUN 21 05/09/2019    CALCIUM 9.7 05/09/2019    PROT 8.0 05/09/2019    ALBUMIN 3.4 (L) 05/09/2019    BILITOT 0.3 05/09/2019    AST 12 05/09/2019    ALKPHOS 65 05/09/2019    ALT 10 05/09/2019     No results found for: COLORU, APPEARANCEUA, SPECGRAV, PHUR, PROTEINUA, GLUCOSEU, KETONESU, BLOODU, LEUKOCYTESUR, NITRITE, UROBILINOGEN    Lab Results   Component Value Date    CRP 86.8 (H) 08/02/2018       Lab Results   Component Value Date    SEDRATE 44 (H) 08/02/2018       Lab Results   Component Value Date    SEDRATE 44 (H) 08/02/2018       No components found for: 25OHVITDTOT, 09EQPFDO2, 33HVMBRJ2, METHODNOTE    No results found for: URICACID    No components found for:  TSPOTTB      · PTH: 244    Rheum Labs:  RANJIT negative  · ANCA screen negative   · C3/4 within normal range  · Double-stranded DNA negative    Infectious Labs:  Hepatitis profile NR     Imaging:  All imaging reviewed and independently  interpreted by me.    CT chest without contrast August 2018  chronic enlarged abnormal appearance to the thyroid gland, appears enlarged compared to previous study.  Extensive chronic interstitial disease of the lungs is present.  There is calcification of the pleural surface on the right as well as irregular pleural thickening.  A pattern of diffuse ground glass type increased attenuation with patchy distribution is seen within the lungs.    TTE July 2018   1 - Concentric hypertrophy.     2 - No wall motion abnormalities.     3 - Normal left ventricular systolic function (EF 60-65%).     4 - Indeterminate LV diastolic function.     5 - Normal right ventricular systolic function .     6 - Pulmonary hypertension. The estimated PA systolic pressure is 45 mmHg.     7 - Mild tricuspid regurgitation.     Pulmonary function test: FVC / FEV1 / Ratio / TLC / DLCO  August 2018  45.6 / 52.8 / 89 / 56 / 69     ASSESSMENT / PLAN:     Stephanie Wilder is a 78 y.o. Black or  female with:    1. Rash  - unknown etiology.  Remains without significant manifestations of overt rheumatic disorder.  - autoimmune workup unrevealing.  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  - Of mention patient on chronic hydralazine.  ANCA screen negative. No features of vasculitis.   - Hx of Venous insufficiency/lipodermatosclerosis.  LE weakness likely from deconditioning.  - continue management per PMD and HD per schedule for ESRD  - consider for Dermatology evaluation per PMD    2. Osteoarthritis  - history of present illness and current findings consistent with rotator cuff/bicipital tendinopathy  - stable since last visit.  Able to perform ADL w/o particular difficulty.  - continue with  range of motion, flexibility, and strengthening exercises.  - Acetaminophen prn -> standing up to 3 grams per day  - Topicals therapy: Capsaicin / NSAIDs   - consider for corticosteroid injection and PT referral    3. Other specified counseling  - over 10 minutes spent regarding below topics:  - Immunization counseling done.  - Nutrition and exercise counseling.  - Regular exercise:  Range of motion and flexibility.  - Medication counseling provided.      Follow up in about 4 months (around 10/4/2019).    Method of contact with patient concerns: [facility]    Disclaimer:  This note is prepared using voice recognition software and as such is likely to have errors and has not been proof read. Please contact me for questions.     Time spent: 25 minutes in face to face discussion concerning diagnosis, prognosis, review of lab and test results, benefits of treatment as well as management of disease, counseling of patient and coordination of care between various health care providers.  Greater than half the time spent was used for coordination of care and counseling of patient.    Oneil Aldana M.D.  Rheumatology Department   [facility]

## 2019-07-11 ENCOUNTER — OFFICE VISIT (OUTPATIENT)
Dept: CARDIOLOGY | Facility: CLINIC | Age: 79
End: 2019-07-11
Payer: MEDICARE

## 2019-07-11 VITALS
HEIGHT: 66 IN | BODY MASS INDEX: 27.44 KG/M2 | DIASTOLIC BLOOD PRESSURE: 66 MMHG | HEART RATE: 72 BPM | SYSTOLIC BLOOD PRESSURE: 180 MMHG

## 2019-07-11 DIAGNOSIS — N18.6 END STAGE RENAL DISEASE: ICD-10-CM

## 2019-07-11 DIAGNOSIS — I27.29 PULMONARY HYPERTENSION ASSOCIATED WITH END-STAGE RENAL DISEASE (ESRD) ON DIALYSIS: ICD-10-CM

## 2019-07-11 DIAGNOSIS — E11.21 TYPE 2 DIABETES MELLITUS WITH DIABETIC NEPHROPATHY, UNSPECIFIED WHETHER LONG TERM INSULIN USE: ICD-10-CM

## 2019-07-11 DIAGNOSIS — Z99.2 PULMONARY HYPERTENSION ASSOCIATED WITH END-STAGE RENAL DISEASE (ESRD) ON DIALYSIS: ICD-10-CM

## 2019-07-11 DIAGNOSIS — I73.9 PVD (PERIPHERAL VASCULAR DISEASE): ICD-10-CM

## 2019-07-11 DIAGNOSIS — N18.6 PULMONARY HYPERTENSION ASSOCIATED WITH END-STAGE RENAL DISEASE (ESRD) ON DIALYSIS: ICD-10-CM

## 2019-07-11 DIAGNOSIS — R60.0 LOCALIZED EDEMA: ICD-10-CM

## 2019-07-11 DIAGNOSIS — I87.2 VENOUS INSUFFICIENCY OF BOTH LOWER EXTREMITIES: Primary | ICD-10-CM

## 2019-07-11 PROCEDURE — 99999 PR PBB SHADOW E&M-EST. PATIENT-LVL III: ICD-10-PCS | Mod: PBBFAC,,, | Performed by: INTERNAL MEDICINE

## 2019-07-11 PROCEDURE — 99213 OFFICE O/P EST LOW 20 MIN: CPT | Mod: PBBFAC | Performed by: INTERNAL MEDICINE

## 2019-07-11 PROCEDURE — 99214 OFFICE O/P EST MOD 30 MIN: CPT | Mod: S$PBB,,, | Performed by: INTERNAL MEDICINE

## 2019-07-11 PROCEDURE — 99999 PR PBB SHADOW E&M-EST. PATIENT-LVL III: CPT | Mod: PBBFAC,,, | Performed by: INTERNAL MEDICINE

## 2019-07-11 PROCEDURE — 99214 PR OFFICE/OUTPT VISIT, EST, LEVL IV, 30-39 MIN: ICD-10-PCS | Mod: S$PBB,,, | Performed by: INTERNAL MEDICINE

## 2019-07-11 RX ORDER — HYDRALAZINE HYDROCHLORIDE 100 MG/1
100 TABLET, FILM COATED ORAL 3 TIMES DAILY
Qty: 90 TABLET | Refills: 6 | Status: SHIPPED | OUTPATIENT
Start: 2019-07-11 | End: 2020-06-16 | Stop reason: SDUPTHER

## 2019-07-11 NOTE — PROGRESS NOTES
Subjective:   Patient ID:  Stephanie Wilder is a 78 y.o. female who presents for cardiac consult of Leg Swelling (feet swelling)      HPI  The patient came in today for cardiac consult of Leg Swelling (feet swelling)    Referring Provider: Michael Cervantes  Reason for initial consult: Leg swelling    Stephanie Wilder is a 78 y.o. female pt with current medical conditions HTN, PVD, venous insufficiency, ESRD on HD M, W,F, DM, neuropathy, anemia, dementia, gerd presents for follow up CV evaluation.     6/29/18  Pt has been having presyncopal feelings during HD. She has been having LE edema x 2 months, gradually getting worse. No SOB, but cannot lay flat, somewhat describes orthopnea. No chest pain. No palpitations. Has been on HD for 13 years. Can walk to bathroom and back. Uses wheelchair, can't walk well overall due to weakness in legs. PT has syncopal episode 12 years ago since episode she couldn't walk, no stroke or other etiology found for lack of walking.     10/23/18  Pt had 2D ECHO with normal Bi V function, Pulm HTN, mild TR. She has LE cellulitis on PO abx, has not cleared for about 2-3 months now, no fevers/chills. Compression stockings have been helpful.   ECG - NSR, RBBB, 1st deg AVB, septal infarct - old    4/9/19  LE SMITHA/venous/arterial Dopplers not completed. BP elevated today but usually very low on HD days. Overall feels well. No syncope/palpitations. Right leg with swelling more with compression stockings on. Discussed needs ultrasounds of legs soon.     7/11/19  Mild PAD noted in recent SMITHA. U/S pending. Started on asa and statin. Has not gotten LE u/s yet. Will increase Hydralazine to 100 tid due to elevated Bp but needs to monitor closely at home.     Patient feels no chest pain, no sob,  no PND, no palpitation, no syncope, no CNS symptoms.    Patient is compliant with medications.    SMITHA  The right ankle brachial index was 0.80 which suggests mild right lower extremity arterial disease.   The left ankle  brachial index was 0.97 which suggests minimal left lower extremity arterial disease.   The right TBI is 0.43.   The left TBI is 0.63.   Waveform analysis are compatible with the above findings.       This document has been electronically    SIGNED BY: Juan Tabares MD On: 04/30/2019 12:46    2D ECHO  CONCLUSIONS     1 - Concentric hypertrophy.     2 - No wall motion abnormalities.     3 - Normal left ventricular systolic function (EF 60-65%).     4 - Indeterminate LV diastolic function.     5 - Normal right ventricular systolic function .     6 - Pulmonary hypertension. The estimated PA systolic pressure is 45 mmHg.     7 - Mild tricuspid regurgitation.     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       Past Surgical History:   Procedure Laterality Date    CATARACT EXTRACTION      COLONOSCOPY N/A 6/4/2014    Performed by [facility]rell [facility] III[facility] at [facility]    EGD N/A 6/4/2014    Performed by Darrell [facility] III, [facility] at [facility]    ESOPHAGOGASTRODUODENOSCOPY (EGD) N/A 2/26/2016    Performed by Darren Manning MD at [facility]    MASTECTOMY Right        Social History     Tobacco Use    Smoking status: Never Smoker    Smokeless tobacco: Never Used   Substance Use Topics    Alcohol use: No    Drug use: No       Family History   Problem Relation Age of Onset    Diabetes Maternal Aunt     Glaucoma Maternal Aunt     Heart attack Maternal Aunt 80    Diabetes Paternal Aunt     Glaucoma Paternal Aunt     Diabetes Child     Heart attack Brother 79       Patient's Medications   New Prescriptions    No medications on file   Previous Medications    ACETAMINOPHEN (TYLENOL) 325 MG TABLET    Take  325 mg by mouth every 6 (six) hours as needed for Pain.    AMLODIPINE (NORVASC) 10 MG TABLET    Take 10 mg by mouth once daily.    ASPIRIN (ECOTRIN) 81 MG EC TABLET    Take 1 tablet (81 mg total) by mouth once daily.    CARVEDILOL (COREG) 25 MG TABLET    Take 25 mg by mouth 2 (two) times daily with meals.    CETIRIZINE (ZYRTEC) 10 MG TABLET    Take 10 mg by mouth once daily.    CLOTRIMAZOLE-BETAMETHASONE (LOTRISONE) CREAM        CRANBERRY FRUIT EXTRACT (CRANBERRY ORAL)    Take by mouth.    DICYCLOMINE (BENTYL) 10 MG CAPSULE    Take 10 mg by mouth 4 (four) times daily before meals and nightly.    DOCUSATE SODIUM (COLACE) 100 MG CAPSULE    Take 100 mg by mouth 2 (two) times daily.    DOXYCYCLINE (VIBRAMYCIN) 100 MG CAP    Take 100 mg by mouth every 12 (twelve) hours.    ERGOCALCIFEROL (VITAMIN D2) 50,000 UNIT CAP    Take 1,000 Units by mouth every 7 days.    ERYTHROMYCIN (ROMYCIN) OPHTHALMIC OINTMENT    Place into the left eye every evening.    ESCITALOPRAM OXALATE (LEXAPRO) 10 MG TABLET    Take 1 tablet (10 mg total) by mouth once daily. 1 Tablet Oral Every day    ETHYL CHLORIDE 100% 100 % SPRAY    Apply topically as needed.    FERROUS SULFATE 325 MG (65 MG IRON) TAB TABLET    Take 325 mg by mouth daily with breakfast.    GABAPENTIN (NEURONTIN) 100 MG CAPSULE    Take 100 mg by mouth 3 (three) times daily.    GUAIFENESIN (MUCINEX) 600 MG 12 HR TABLET    Take 1,200 mg by mouth 2 (two) times daily.    HYDROCORTISONE (PREPARATION H HYDROCORTISONE) 1 % CREAM    Apply topically 2 (two) times daily.    HYDROXYZINE PAMOATE (VISTARIL) 25 MG CAP    Take 25 mg by mouth nightly.    INSULIN ASP PRT-INSULIN ASPART (NOVOLOG MIX 70-30) 100 UNIT/ML (70-30) SOLN    Sliding scale    INSULIN ASPART U-100 (NOVOLOG) 100 UNIT/ML INJECTION    Inject into the skin 3 (three) times daily before meals.    INSULIN DETEMIR (LEVEMIR) 100 UNIT/ML INJECTION    Inject 12 Units into the skin once daily.    LISINOPRIL (PRINIVIL,ZESTRIL) 40 MG TABLET     Take 40 mg by mouth once daily.    LOPERAMIDE (IMODIUM) 2 MG CAPSULE    Take 2 mg by mouth 4 (four) times daily as needed for Diarrhea.    MIDODRINE (PROAMATINE) 5 MG TAB    Take 2.5 mg by mouth 2 (two) times daily with meals.    MIRTAZAPINE (REMERON) 30 MG TABLET    Take 30 mg by mouth every evening.    NEOMYCIN-POLYMYXIN-DEXAMETHASONE (DEXACINE) 3.5 MG/G-10,000 UNIT/G-0.1 % OINT    every 6 (six) hours.    NEOMYCIN-POLYMYXIN-DEXAMETHASONE (MAXITROL) 3.5MG/ML-10,000 UNIT/ML-0.1 % DRPS    Place 1 drop into the right eye 4 (four) times daily. USE 1 DROP 4 TIMES A DAY FOR 1 WEEK INTO THE AFFECTED EYE    OLANZAPINE (ZYPREXA) 5 MG TABLET    Take 5 mg by mouth every evening.    ONDANSETRON (ZOFRAN) 4 MG TABLET    Take 4 mg by mouth every 8 (eight) hours as needed for Nausea.    PANTOPRAZOLE (PROTONIX) 40 MG TABLET    Take 1 tablet (40 mg total) by mouth 2 (two) times daily.    -PROPYLENE GLYCOL, PF, (SYSTANE ULTRA, PF,) 0.4-0.3 % DPET    Apply to eye.    POLYETHYLENE GLYCOL (GLYCOLAX) 17 GRAM PWPK    Take by mouth.    PRAVASTATIN (PRAVACHOL) 40 MG TABLET    Take 1 tablet (40 mg total) by mouth once daily.    SEVELAMER CARBONATE (RENVELA) 800 MG TAB    Take 800 mg by mouth 3 (three) times daily with meals.    TRAMADOL (ULTRAM) 50 MG TABLET    Take 50 mg by mouth every 6 (six) hours as needed for Pain.   Modified Medications    Modified Medication Previous Medication    HYDRALAZINE (APRESOLINE) 100 MG TABLET hydrALAZINE (APRESOLINE) 50 MG tablet       Take 1 tablet (100 mg total) by mouth 3 (three) times daily.    Take 50 mg by mouth 3 (three) times daily.   Discontinued Medications    No medications on file       Review of Systems   Constitutional: Negative.    HENT: Negative.    Eyes: Negative.    Respiratory: Negative.    Cardiovascular: Positive for leg swelling.   Gastrointestinal: Negative.    Genitourinary: Negative.    Musculoskeletal: Negative.    Skin: Negative.    Neurological: Positive for focal weakness.  "  Endo/Heme/Allergies: Negative.    Psychiatric/Behavioral: Negative.    All 12 systems otherwise negative.      Wt Readings from Last 3 Encounters:   06/04/19 77.1 kg (170 lb)   05/09/19 77.1 kg (170 lb)   10/23/18 76.2 kg (168 lb)     Temp Readings from Last 3 Encounters:   06/22/16 98.6 °F (37 °C) (Oral)   04/11/16 98.9 °F (37.2 °C) (Oral)   03/04/16 98.9 °F (37.2 °C) (Axillary)     BP Readings from Last 3 Encounters:   07/11/19 (!) 180/66   06/04/19 (!) 189/71   05/09/19 (!) 150/72     Pulse Readings from Last 3 Encounters:   07/11/19 72   06/04/19 73   10/23/18 80       BP (!) 180/66 (BP Location: Left arm, Patient Position: Sitting, BP Method: Medium (Manual))   Pulse 72   Ht 5' 6" (1.676 m)   BMI 27.44 kg/m²     Objective:   Physical Exam   Constitutional: She is oriented to person, place, and time. She appears well-developed and well-nourished. No distress.   HENT:   Head: Normocephalic and atraumatic.   Nose: Nose normal.   Mouth/Throat: Oropharynx is clear and moist.   Eyes: Conjunctivae and EOM are normal. No scleral icterus.   Neck: Normal range of motion. Neck supple. No JVD present. No thyromegaly present.   Cardiovascular: Normal rate, regular rhythm, S1 normal and S2 normal. Exam reveals no gallop, no S3, no S4 and no friction rub.   Murmur heard.   Midsystolic murmur is present with a grade of 2/6 at the upper right sternal border.  Pulmonary/Chest: Effort normal and breath sounds normal. No stridor. No respiratory distress. She has no wheezes. She has no rales. She exhibits no tenderness.   Abdominal: Soft. Bowel sounds are normal. She exhibits no distension and no mass. There is no tenderness. There is no rebound.   Genitourinary:   Genitourinary Comments: Deferred   Musculoskeletal: Normal range of motion. She exhibits edema (1+). She exhibits no tenderness or deformity.   Lymphadenopathy:     She has no cervical adenopathy.   Neurological: She is alert and oriented to person, place, and time. " She exhibits normal muscle tone. Coordination normal.   Skin: Skin is warm and dry. Rash (lipodermatosclerosis on B/L LE) noted. She is not diaphoretic. No erythema. No pallor.   Psychiatric: She has a normal mood and affect. Her behavior is normal. Judgment and thought content normal.   Nursing note and vitals reviewed.      Lab Results   Component Value Date     05/09/2019    K 4.6 05/09/2019    CL 98 05/09/2019    CO2 29 05/09/2019    BUN 21 05/09/2019    CREATININE 4.3 (H) 05/09/2019    GLU 98 05/09/2019    HGBA1C 6.2 02/27/2016    MG 2.0 02/27/2016    AST 12 05/09/2019    ALT 10 05/09/2019    ALBUMIN 3.4 (L) 05/09/2019    PROT 8.0 05/09/2019    BILITOT 0.3 05/09/2019    WBC 4.46 08/02/2018    HGB 10.2 (L) 08/02/2018    HCT 34.7 (L) 08/02/2018    HCT 38 02/26/2016     (H) 08/02/2018     (L) 08/02/2018    INR 1.1 02/26/2016    TSH <0.010 (L) 04/13/2016     (H) 08/02/2018     Assessment:      1. Venous insufficiency of both lower extremities    2. PVD (peripheral vascular disease)    3. Pulmonary hypertension associated with end-stage renal disease (ESRD) on dialysis    4. End stage renal disease    5. Type 2 diabetes mellitus with diabetic nephropathy, unspecified whether long term insulin use    6. Localized edema        Plan:   1. LE edema sec Venous insufficiency  - rec compression stockings daily  - low salt diet  - r/o PVD - LE arterial u/s pending  - LE venous u/s    2. HTN/hypotension with HD - uncontrolled  -increase Hydralazine, cont rest of meds    3. ESRD  - cont HD  - compression stockings will increase venous return to allow more volume removal with HD    4. Pulm HTN  - cont HD and f/u with pulm    5. PVD  - cont asa, statin  - Le u/s ordered   Thank you for allowing me to participate in this patient's care. Please do not hesitate to contact me with any questions or concerns. Consult note has been forwarded to the referral physician.

## 2019-07-16 ENCOUNTER — HOSPITAL ENCOUNTER (OUTPATIENT)
Dept: RADIOLOGY | Facility: HOSPITAL | Age: 79
Discharge: HOME | End: 2019-07-16
Attending: INTERNAL MEDICINE
Payer: MEDICARE

## 2019-07-16 ENCOUNTER — OFFICE VISIT (OUTPATIENT)
Dept: DERMATOLOGY | Facility: CLINIC | Age: 79
End: 2019-07-16
Payer: MEDICARE

## 2019-07-16 DIAGNOSIS — I87.2 VENOUS INSUFFICIENCY OF BOTH LOWER EXTREMITIES: ICD-10-CM

## 2019-07-16 DIAGNOSIS — R60.0 EDEMA OF BOTH LEGS: ICD-10-CM

## 2019-07-16 DIAGNOSIS — I89.0 ELEPHANTIASIS: Primary | ICD-10-CM

## 2019-07-16 DIAGNOSIS — R60.0 LOCALIZED EDEMA: ICD-10-CM

## 2019-07-16 PROCEDURE — 99999 PR PBB SHADOW E&M-EST. PATIENT-LVL II: CPT | Mod: PBBFAC,,, | Performed by: STUDENT IN AN ORGANIZED HEALTH CARE EDUCATION/TRAINING PROGRAM

## 2019-07-16 PROCEDURE — 93970 EXTREMITY STUDY: CPT | Mod: TC

## 2019-07-16 PROCEDURE — 99212 OFFICE O/P EST SF 10 MIN: CPT | Mod: PBBFAC,25 | Performed by: STUDENT IN AN ORGANIZED HEALTH CARE EDUCATION/TRAINING PROGRAM

## 2019-07-16 PROCEDURE — 99999 PR PBB SHADOW E&M-EST. PATIENT-LVL II: ICD-10-PCS | Mod: PBBFAC,,, | Performed by: STUDENT IN AN ORGANIZED HEALTH CARE EDUCATION/TRAINING PROGRAM

## 2019-07-16 PROCEDURE — 99202 OFFICE O/P NEW SF 15 MIN: CPT | Mod: S$PBB,,, | Performed by: STUDENT IN AN ORGANIZED HEALTH CARE EDUCATION/TRAINING PROGRAM

## 2019-07-16 PROCEDURE — 93970 EXTREMITY STUDY: CPT | Mod: 26,,, | Performed by: RADIOLOGY

## 2019-07-16 PROCEDURE — 93970 US LOWER EXTREMITY VEINS BILATERAL: ICD-10-PCS | Mod: 26,,, | Performed by: RADIOLOGY

## 2019-07-16 PROCEDURE — 99202 PR OFFICE/OUTPT VISIT, NEW, LEVL II, 15-29 MIN: ICD-10-PCS | Mod: S$PBB,,, | Performed by: STUDENT IN AN ORGANIZED HEALTH CARE EDUCATION/TRAINING PROGRAM

## 2019-07-16 NOTE — PROGRESS NOTES
Subjective:       Patient ID:  Stephanie Wilder is a 78 y.o. female who presents for   Chief Complaint   Patient presents with    Lesion     to bilateral legs x 6 months, drain, no tx     History of Present Illness: The patient presents with chief complaint of lesions.  Location: legs  Duration: several years  Signs/Symptoms: thickened skin, draining blisters. Pt complains of fluid in the legs for many years.     Prior treatments: none        Review of Systems   Skin: Negative for itching and rash.        Objective:    Physical Exam   Constitutional: She appears well-developed and well-nourished. No distress.   Neurological: She is alert and oriented to person, place, and time. She is not disoriented.   Psychiatric: She has a normal mood and affect.   Skin:   Areas Examined (abnormalities noted in diagram):   Head / Face Inspection Performed  Neck Inspection Performed  RUE Inspected  LUE Inspection Performed  RLE Inspected  LLE Inspection Performed              Diagram Legend     Erythematous scaling macule/papule c/w actinic keratosis       Vascular papule c/w angioma      Pigmented verrucoid papule/plaque c/w seborrheic keratosis      Yellow umbilicated papule c/w sebaceous hyperplasia      Irregularly shaped tan macule c/w lentigo     1-2 mm smooth white papules consistent with Milia      Movable subcutaneous cyst with punctum c/w epidermal inclusion cyst      Subcutaneous movable cyst c/w pilar cyst      Firm pink to brown papule c/w dermatofibroma      Pedunculated fleshy papule(s) c/w skin tag(s)      Evenly pigmented macule c/w junctional nevus     Mildly variegated pigmented, slightly irregular-bordered macule c/w mildly atypical nevus      Flesh colored to evenly pigmented papule c/w intradermal nevus       Pink pearly papule/plaque c/w basal cell carcinoma      Erythematous hyperkeratotic cursted plaque c/w SCC      Surgical scar with no sign of skin cancer recurrence      Open and closed comedones       Inflammatory papules and pustules      Verrucoid papule consistent consistent with wart     Erythematous eczematous patches and plaques     Dystrophic onycholytic nail with subungual debris c/w onychomycosis     Umbilicated papule    Erythematous-base heme-crusted tan verrucoid plaque consistent with inflamed seborrheic keratosis     Erythematous Silvery Scaling Plaque c/w Psoriasis     See annotation      Assessment / Plan:        Elephantiasis with edema of both legs - Discussed etiology in depth with patient and chronic, long term changes on the legs due to the swelling and fluid extravasation. Counseled patient on wound care/cleaning to the area, as well as leg elevation, compression, and need for appropriate fluid management.          Follow up if symptoms worsen or fail to improve.

## 2019-07-19 ENCOUNTER — TELEPHONE (OUTPATIENT)
Dept: CARDIOLOGY | Facility: CLINIC | Age: 79
End: 2019-07-19

## 2019-07-19 NOTE — TELEPHONE ENCOUNTER
Called and spoke w/ Rosa FORTUNE at [facility]. Gave results of US Lower Ext Veins Bilat.    Was instructed to fax a copy of report and provider note to [phone].

## 2019-08-01 ENCOUNTER — CLINICAL SUPPORT (OUTPATIENT)
Dept: CARDIOLOGY | Facility: CLINIC | Age: 79
End: 2019-08-01
Attending: INTERNAL MEDICINE
Payer: MEDICARE

## 2019-08-01 DIAGNOSIS — I73.9 PVD (PERIPHERAL VASCULAR DISEASE): ICD-10-CM

## 2019-08-01 PROCEDURE — 93925 LOWER EXTREMITY STUDY: CPT | Mod: PBBFAC | Performed by: INTERNAL MEDICINE

## 2019-08-01 PROCEDURE — 93925 CAR US DOPPLER ARTERIAL LEGS BILATERAL: ICD-10-PCS | Mod: 26,S$PBB,, | Performed by: INTERNAL MEDICINE

## 2019-08-07 ENCOUNTER — TELEPHONE (OUTPATIENT)
Dept: CARDIOLOGY | Facility: CLINIC | Age: 79
End: 2019-08-07

## 2019-08-07 NOTE — TELEPHONE ENCOUNTER
Called and identified myself and reason for and was informed, could not give information on clients living there.    I explained again, this is a medical call and need to speak with someone in care of Mrs. Wilder.    I was transferred to the nurse and explained results of recent leg study and followup appointment needed ASAP.    I was told I need the appointment desk and was transferred. Held phone greater than 5 minutes and no one answered.    Disconnected call and will contact later.

## 2019-08-07 NOTE — TELEPHONE ENCOUNTER
Called to [facility] and spoke with nurse Idalia--informed of patient's results of leg test and need for appointment as soon as possible with --states patient can come on Tuesdays and Thursdays only--patient has been scheduled with ZHEN Khan on 8/15/19 and Dr. Reina will see speak with patient in reference to ultrasound of leg

## 2019-08-07 NOTE — TELEPHONE ENCOUNTER
----- Message from Nigel Hector MD sent at 8/3/2019 12:21 PM CDT -----  Please call the patient regarding her abnormal result. Significant blockages in leg arteries, set up appt with Dr. Latosha cheek to discuss angiogram and stents/bypass if needed.

## 2019-08-15 ENCOUNTER — OFFICE VISIT (OUTPATIENT)
Dept: CARDIOLOGY | Facility: CLINIC | Age: 79
End: 2019-08-15
Payer: MEDICARE

## 2019-08-15 VITALS
HEIGHT: 66 IN | WEIGHT: 166 LBS | SYSTOLIC BLOOD PRESSURE: 178 MMHG | DIASTOLIC BLOOD PRESSURE: 68 MMHG | HEART RATE: 68 BPM | BODY MASS INDEX: 26.68 KG/M2

## 2019-08-15 DIAGNOSIS — M79.3 LIPODERMATOSCLEROSIS: ICD-10-CM

## 2019-08-15 DIAGNOSIS — I73.9 PVD (PERIPHERAL VASCULAR DISEASE): Primary | ICD-10-CM

## 2019-08-15 DIAGNOSIS — Z99.2 PULMONARY HYPERTENSION ASSOCIATED WITH END-STAGE RENAL DISEASE (ESRD) ON DIALYSIS: ICD-10-CM

## 2019-08-15 DIAGNOSIS — J84.9 ILD (INTERSTITIAL LUNG DISEASE): ICD-10-CM

## 2019-08-15 DIAGNOSIS — N18.6 PULMONARY HYPERTENSION ASSOCIATED WITH END-STAGE RENAL DISEASE (ESRD) ON DIALYSIS: ICD-10-CM

## 2019-08-15 DIAGNOSIS — E11.3599 TYPE 2 DIABETES MELLITUS WITH PROLIFERATIVE RETINOPATHY WITHOUT MACULAR EDEMA, WITH LONG-TERM CURRENT USE OF INSULIN, UNSPECIFIED LATERALITY: ICD-10-CM

## 2019-08-15 DIAGNOSIS — Z79.4 TYPE 2 DIABETES MELLITUS WITH PROLIFERATIVE RETINOPATHY WITHOUT MACULAR EDEMA, WITH LONG-TERM CURRENT USE OF INSULIN, UNSPECIFIED LATERALITY: ICD-10-CM

## 2019-08-15 DIAGNOSIS — I27.29 PULMONARY HYPERTENSION ASSOCIATED WITH END-STAGE RENAL DISEASE (ESRD) ON DIALYSIS: ICD-10-CM

## 2019-08-15 DIAGNOSIS — I87.2 VENOUS INSUFFICIENCY OF BOTH LOWER EXTREMITIES: ICD-10-CM

## 2019-08-15 PROCEDURE — 99215 OFFICE O/P EST HI 40 MIN: CPT | Mod: PBBFAC | Performed by: PHYSICIAN ASSISTANT

## 2019-08-15 PROCEDURE — 99999 PR PBB SHADOW E&M-EST. PATIENT-LVL V: CPT | Mod: PBBFAC,,, | Performed by: PHYSICIAN ASSISTANT

## 2019-08-15 PROCEDURE — 99214 PR OFFICE/OUTPT VISIT, EST, LEVL IV, 30-39 MIN: ICD-10-PCS | Mod: S$PBB,,, | Performed by: PHYSICIAN ASSISTANT

## 2019-08-15 PROCEDURE — 99214 OFFICE O/P EST MOD 30 MIN: CPT | Mod: S$PBB,,, | Performed by: PHYSICIAN ASSISTANT

## 2019-08-15 PROCEDURE — 99999 PR PBB SHADOW E&M-EST. PATIENT-LVL V: ICD-10-PCS | Mod: PBBFAC,,, | Performed by: PHYSICIAN ASSISTANT

## 2019-08-15 NOTE — PROGRESS NOTES
Subjective:    Patient ID:  Stephanie Wilder is a 78 y.o. female who presents for follow-up of PVD/arterial U/S results      HPI   Ms. Wilder is a 78 year old female patient whose current medical conditions include HTN, PVD, venous insufficiency, ESRD on HD, DM, anemia, dementia, and GERD who presents today for follow-up. Patient recently seen by Dr. Hector and had leg studies ordered due to leg weakness/fatigue. She returns today and states she is doing well. Still complains of LE swelling, chronic per patient. No other cardiac complaints. Denies any chest pain, SOB, or other anginal signs or symptoms. No orthopnea, PND, or abdominal bloating. No excessive weight gain. Occasional lightheadedness/dizziness, sometimes when her BG drops. No near syncope, syncope, or falls. BP elevated today however patient took her meds shortly PTA. It tends to be variable, and will drop with dialysis (she has to take midodrine on those days). She is compliant with her medications.    Followed by vascular surgery in the past. Discussed leg studies in detail---Dr. Reina reviewed and recommended patient f/u with vascular surgery as she has her dialysis access graft in her right thigh area.       Review of Systems   Constitution: Positive for malaise/fatigue. Negative for chills, decreased appetite and fever.   HENT: Negative for congestion, hoarse voice and sore throat.    Eyes: Negative for blurred vision and discharge.   Cardiovascular: Positive for leg swelling. Negative for chest pain, claudication, cyanosis, dyspnea on exertion, irregular heartbeat, near-syncope, orthopnea, palpitations and paroxysmal nocturnal dyspnea.   Respiratory: Negative for cough, hemoptysis, shortness of breath, snoring, sputum production and wheezing.    Endocrine: Negative for cold intolerance and heat intolerance.   Hematologic/Lymphatic: Negative for bleeding problem. Does not bruise/bleed easily.   Skin: Negative for rash.   Musculoskeletal: Positive for  "arthritis and joint pain. Negative for back pain, joint swelling, muscle cramps, muscle weakness and myalgias.   Gastrointestinal: Negative for abdominal pain, constipation, diarrhea, heartburn, melena and nausea.   Genitourinary: Negative for hematuria.   Neurological: Positive for weakness. Negative for dizziness, focal weakness, headaches, light-headedness, loss of balance, numbness, paresthesias and seizures.   Psychiatric/Behavioral: Negative for memory loss. The patient does not have insomnia.    Allergic/Immunologic: Negative for hives.     BP (!) 178/68   Pulse 68   Ht 5' 6" (1.676 m)   Wt 75.3 kg (166 lb)   BMI 26.79 kg/m²     Objective:    Physical Exam   Constitutional: She is oriented to person, place, and time. She appears well-developed and well-nourished. No distress.   HENT:   Head: Normocephalic and atraumatic.   Eyes: Pupils are equal, round, and reactive to light. Right eye exhibits no discharge. Left eye exhibits no discharge.   Neck: Neck supple. No JVD present.   Cardiovascular: Normal rate, regular rhythm, S1 normal, S2 normal and normal heart sounds.   No murmur heard.  Pulmonary/Chest: Effort normal and breath sounds normal. No respiratory distress. She has no wheezes. She has no rales.   Abdominal: Soft. She exhibits no distension.   Musculoskeletal: She exhibits edema (BLE hard edema ).   Neurological: She is alert and oriented to person, place, and time.   Skin: Skin is warm and dry. She is not diaphoretic. No erythema.   CVI changes BLE     Psychiatric: She has a normal mood and affect. Her behavior is normal. Thought content normal.   Nursing note and vitals reviewed.    TEST DESCRIPTION   RIGHT   cm/sec  PFA 85 cm/sec  SFAo 401 cm/sec  SFAp 376 cm/sec  SFAm 151 cm/sec  SFAd 219 cm/sec   cm/sec  TIB TRNK 353 cm/sec   cm/sec   cm/sec  PT 61 cm/sec    The right SMITHA is .8    LEFT   cm/sec   cm/sec  SFAo 255 cm/sec  SFAp 185 cm/sec  SFAm 196 " cm/sec  SFAd 277 cm/sec   cm/sec  TIB TRNK 203 cm/sec   cm/sec   cm/sec  PT 29 cm/sec      The left SMITHA is .97      Patient has Rt prox/mid thigh dialysis graft.    COMPLICATIONS   RLE diffuse monophasic waveform suggesting inflow disease;   LLE: occluded distal PTA with diffuse monophasic waveform.  50 to 99% lesios in bilateral CFA, SFA, popliteal and tibioperoneal trunk arteries.   RLE Diaslysis graft petent.    RLE SMITHA 0.8, mild lower extremity arterial disease.  LLE SMITHA 0.97, minimal disease  Assessment:       1. PVD (peripheral vascular disease)    2. Type 2 diabetes mellitus with proliferative retinopathy without macular edema, with long-term current use of insulin, unspecified laterality    3. ILD (interstitial lung disease)    4. Pulmonary hypertension associated with end-stage renal disease (ESRD) on dialysis    5. Lipodermatosclerosis    6. Venous insufficiency of both lower extremities      Patient presents for f/u. Overall, stable CV wise. Discussed SMITHA/arterial U/S with Dr. Reina. Needs to f/u with Dr. Burnett in next 1-2 weeks. BP elevated but patient just took her meds and has been variable. Keep same meds for now.   Plan:   -Continue current medical management and risk factor modification  -Cardiac, low salt diet  -Leg elevation, low salt diet, dialysis   -Follow-up with Dr. Burnett for PVD/CVI evaluation  -RTC clinic with Dr. Hector in 6-8 weeks for BP check    Chart reviewed. Dr. Hector agrees with plan as outlined above.

## 2019-10-08 ENCOUNTER — OFFICE VISIT (OUTPATIENT)
Dept: RHEUMATOLOGY | Facility: CLINIC | Age: 79
End: 2019-10-08
Payer: MEDICARE

## 2019-10-08 VITALS
HEIGHT: 66 IN | SYSTOLIC BLOOD PRESSURE: 156 MMHG | HEART RATE: 77 BPM | DIASTOLIC BLOOD PRESSURE: 82 MMHG | WEIGHT: 167.31 LBS | BODY MASS INDEX: 26.89 KG/M2

## 2019-10-08 DIAGNOSIS — Z71.89 COUNSELING ON HEALTH PROMOTION AND DISEASE PREVENTION: ICD-10-CM

## 2019-10-08 DIAGNOSIS — M15.9 PRIMARY OSTEOARTHRITIS INVOLVING MULTIPLE JOINTS: ICD-10-CM

## 2019-10-08 DIAGNOSIS — R21 RASH: Primary | ICD-10-CM

## 2019-10-08 PROCEDURE — 99999 PR PBB SHADOW E&M-EST. PATIENT-LVL III: ICD-10-PCS | Mod: PBBFAC,,, | Performed by: INTERNAL MEDICINE

## 2019-10-08 PROCEDURE — 99999 PR PBB SHADOW E&M-EST. PATIENT-LVL III: CPT | Mod: PBBFAC,,, | Performed by: INTERNAL MEDICINE

## 2019-10-08 PROCEDURE — 99213 OFFICE O/P EST LOW 20 MIN: CPT | Mod: PBBFAC | Performed by: INTERNAL MEDICINE

## 2019-10-08 PROCEDURE — 99213 PR OFFICE/OUTPT VISIT, EST, LEVL III, 20-29 MIN: ICD-10-PCS | Mod: S$PBB,,, | Performed by: INTERNAL MEDICINE

## 2019-10-08 PROCEDURE — 99213 OFFICE O/P EST LOW 20 MIN: CPT | Mod: S$PBB,,, | Performed by: INTERNAL MEDICINE

## 2019-10-08 NOTE — PROGRESS NOTES
RHEUMATOLOGY OUTPATIENT CLINIC NOTE    10/8/2019    Attending Rheumatologist: Oneil Aldana  Primary Care Provider: Garry Vazquez MD   Physician Requesting Consultation: No referring provider defined for this encounter.  Chief Complaint/Reason For Consultation:  Assess for rheumatic disease.    Subjective:       CAPRICE Wilder is a 78 y.o. Black or  female with skin thickening comes for follow up.    Today  Last seen on June.  Remain with no features of active rheumatic disorder, recommended dermatology evaluation.  No acute complaints.  Denies any joint pain or swelling.  Does not have Raynaud's,  or GI complaints.    Review of Systems   Constitutional: Negative for chills and fever.   Eyes: Negative for pain and redness.   Respiratory: Negative for cough and shortness of breath.    Cardiovascular: Negative for chest pain and leg swelling.   Gastrointestinal: Negative for blood in stool, constipation and heartburn.        Denies dysphagia.   Genitourinary: Negative for dysuria and urgency.   Musculoskeletal: Negative for back pain and joint pain (previously on Left shoulder, mechanical pattern.).   Skin: Positive for rash (Lower extremities, Hx of PVD).        Denies photosensitivity, denies alopecia, denies sclerodactyly, denies Raynaud's phenomenon,denies digital ulcers, no psoriasis, no purpura, denies nodules.   Neurological: Negative for tingling and focal weakness.   Endo/Heme/Allergies: Does not bruise/bleed easily.   Psychiatric/Behavioral: Negative for memory loss. The patient does not have insomnia.    All other systems reviewed and are negative.    Chronic comorbid conditions affecting medical decision making today:  Past Medical History:   Diagnosis Date    Anemia     Constipation     Dementia     Dementia     Diabetes mellitus     Diabetic retinopathy     Dysphagia     End stage renal disease on dialysis     Tu, Th, Sat    GERD (gastroesophageal reflux disease)      Hypertension     Neuropathy     Pneumonia     Traction detachment of left retina 9/28/2015     Past Surgical History:   Procedure Laterality Date    CATARACT EXTRACTION      MASTECTOMY Right      Family History   Problem Relation Age of Onset    Diabetes Maternal Aunt     Glaucoma Maternal Aunt     Heart attack Maternal Aunt 80    Diabetes Paternal Aunt     Glaucoma Paternal Aunt     Diabetes Child     Heart attack Brother 79     Social History     Substance and Sexual Activity   Alcohol Use No     Social History     Tobacco Use   Smoking Status Never Smoker   Smokeless Tobacco Never Used     Social History     Substance and Sexual Activity   Drug Use No       Current Outpatient Medications:     acetaminophen (TYLENOL) 325 MG tablet, Take 325 mg by mouth every 6 (six) hours as needed for Pain., Disp: , Rfl:     amLODIPine (NORVASC) 10 MG tablet, Take 10 mg by mouth once daily., Disp: , Rfl:     aspirin (ECOTRIN) 81 MG EC tablet, Take 1 tablet (81 mg total) by mouth once daily., Disp: 30 tablet, Rfl: 0    carvedilol (COREG) 25 MG tablet, Take 25 mg by mouth 2 (two) times daily with meals., Disp: , Rfl:     cetirizine (ZYRTEC) 10 MG tablet, Take 10 mg by mouth once daily., Disp: , Rfl:     clotrimazole-betamethasone (LOTRISONE) cream, , Disp: , Rfl:     cranberry fruit extract (CRANBERRY ORAL), Take by mouth., Disp: , Rfl:     dicyclomine (BENTYL) 10 MG capsule, Take 10 mg by mouth 4 (four) times daily before meals and nightly., Disp: , Rfl:     docusate sodium (COLACE) 100 MG capsule, Take 100 mg by mouth 2 (two) times daily., Disp: , Rfl:     doxycycline (VIBRAMYCIN) 100 MG Cap, Take 100 mg by mouth every 12 (twelve) hours., Disp: , Rfl:     ergocalciferol (VITAMIN D2) 50,000 unit Cap, Take 1,000 Units by mouth every 7 days., Disp: , Rfl:     erythromycin (ROMYCIN) ophthalmic ointment, Place into the left eye every evening., Disp: 3.5 g, Rfl: 0    escitalopram oxalate (LEXAPRO) 10 MG tablet,  Take 1 tablet (10 mg total) by mouth once daily. 1 Tablet Oral Every day, Disp: 15 tablet, Rfl: 0    ethyl chloride 100% 100 % spray, Apply topically as needed., Disp: , Rfl:     ferrous sulfate 325 mg (65 mg iron) Tab tablet, Take 325 mg by mouth daily with breakfast., Disp: , Rfl:     gabapentin (NEURONTIN) 100 MG capsule, Take 100 mg by mouth 3 (three) times daily., Disp: , Rfl:     guaifenesin (MUCINEX) 600 mg 12 hr tablet, Take 1,200 mg by mouth 2 (two) times daily., Disp: , Rfl:     hydrALAZINE (APRESOLINE) 100 MG tablet, Take 1 tablet (100 mg total) by mouth 3 (three) times daily., Disp: 90 tablet, Rfl: 6    hydrocortisone (PREPARATION H HYDROCORTISONE) 1 % cream, Apply topically 2 (two) times daily., Disp: , Rfl:     hydrOXYzine pamoate (VISTARIL) 25 MG Cap, Take 25 mg by mouth nightly., Disp: , Rfl:     insulin asp prt-insulin aspart (NOVOLOG MIX 70-30) 100 unit/mL (70-30) Soln, Sliding scale, Disp: , Rfl:     insulin aspart U-100 (NOVOLOG) 100 unit/mL injection, Inject into the skin 3 (three) times daily before meals., Disp: , Rfl:     insulin detemir (LEVEMIR) 100 unit/mL injection, Inject 12 Units into the skin once daily., Disp: , Rfl:     lisinopril (PRINIVIL,ZESTRIL) 40 MG tablet, Take 40 mg by mouth once daily., Disp: , Rfl:     loperamide (IMODIUM) 2 mg capsule, Take 2 mg by mouth 4 (four) times daily as needed for Diarrhea., Disp: , Rfl:     midodrine (PROAMATINE) 5 MG Tab, Take 2.5 mg by mouth 2 (two) times daily with meals., Disp: , Rfl:     mirtazapine (REMERON) 30 MG tablet, Take 30 mg by mouth every evening., Disp: , Rfl:     neomycin-polymyxin-dexamethasone (DEXACINE) 3.5 mg/g-10,000 unit/g-0.1 % Oint, every 6 (six) hours., Disp: , Rfl:     neomycin-polymyxin-dexamethasone (MAXITROL) 3.5mg/mL-10,000 unit/mL-0.1 % DrpS, Place 1 drop into the right eye 4 (four) times daily. USE 1 DROP 4 TIMES A DAY FOR 1 WEEK INTO THE AFFECTED EYE, Disp: 5 mL, Rfl: 2    olanzapine (ZYPREXA) 5  MG tablet, Take 5 mg by mouth every evening., Disp: , Rfl:     ondansetron (ZOFRAN) 4 MG tablet, Take 4 mg by mouth every 8 (eight) hours as needed for Nausea., Disp: , Rfl:     peg 400-propylene glycol, PF, (SYSTANE ULTRA, PF,) 0.4-0.3 % Dpet, Apply to eye., Disp: , Rfl:     polyethylene glycol (GLYCOLAX) 17 gram PwPk, Take by mouth., Disp: , Rfl:     pravastatin (PRAVACHOL) 40 MG tablet, Take 1 tablet (40 mg total) by mouth once daily., Disp: 90 tablet, Rfl: 1    sevelamer carbonate (RENVELA) 800 mg Tab, Take 800 mg by mouth 3 (three) times daily with meals., Disp: , Rfl:     traMADol (ULTRAM) 50 mg tablet, Take 50 mg by mouth every 6 (six) hours as needed for Pain., Disp: , Rfl:     pantoprazole (PROTONIX) 40 MG tablet, Take 1 tablet (40 mg total) by mouth 2 (two) times daily., Disp: 60 tablet, Rfl: 1     Objective:         Vitals:    10/08/19 0932   BP: (!) 156/82   Pulse: 77     Physical Exam   Nursing note and vitals reviewed.  Constitutional: She is oriented to person, place, and time and well-developed, well-nourished, and in no distress. No distress.   HENT:   Head: Normocephalic.   no oral ulcers, normal pooling of saliva.  no parotid enlargement.   Eyes: Conjunctivae are normal. Pupils are equal, round, and reactive to light.   Absent Episcleritis/scleritis.   Neck: Normal range of motion.   Cardiovascular: Normal rate and intact distal pulses.    Pulmonary/Chest: Effort normal. No respiratory distress.   Abdominal: Soft. She exhibits no distension.   Neurological: She is alert and oriented to person, place, and time.   absent sensory deficits  muscle strength 4+/5 LE proximally, 5/5 through otherwise.     Reports difficulty walking for > a decade s/p syncopal episode   Skin: Rash (+Lipodermatosclerosis.  Skin induration noted on posterior thigh areas bilaterally.  No particular nodule or calcification on my exam.) noted. No erythema.     Musculoskeletal: Normal range of motion. She exhibits  tenderness (Bicipital tendon left.). She exhibits no edema or deformity.   : strong  No synovitis.    AROM: intact  PROM: intact    Rotator cuff maneuvers negative, Yergason's positive left.  Arm drop test negative.    Devices used by patient:  Wheelchair       Reviewed old and all outside pertinent medical records available.    All lab results personally reviewed and interpreted by me.  Lab Results   Component Value Date    WBC 4.46 08/02/2018    HGB 10.2 (L) 08/02/2018    HCT 34.7 (L) 08/02/2018     (H) 08/02/2018    MCH 31.9 (H) 08/02/2018    MCHC 29.4 (L) 08/02/2018    RDW 16.3 (H) 08/02/2018     (L) 08/02/2018    MPV 10.0 08/02/2018     Lab Results   Component Value Date     05/09/2019    K 4.6 05/09/2019    CL 98 05/09/2019    CO2 29 05/09/2019    GLU 98 05/09/2019    BUN 21 05/09/2019    CALCIUM 9.7 05/09/2019    PROT 8.0 05/09/2019    ALBUMIN 3.4 (L) 05/09/2019    BILITOT 0.3 05/09/2019    AST 12 05/09/2019    ALKPHOS 65 05/09/2019    ALT 10 05/09/2019     No results found for: COLORU, APPEARANCEUA, SPECGRAV, PHUR, PROTEINUA, GLUCOSEU, KETONESU, BLOODU, LEUKOCYTESUR, NITRITE, UROBILINOGEN    Lab Results   Component Value Date    CRP 86.8 (H) 08/02/2018       Lab Results   Component Value Date    SEDRATE 44 (H) 08/02/2018       Lab Results   Component Value Date    SEDRATE 44 (H) 08/02/2018       No components found for: 25OHVITDTOT, 72YZQQRC1, 54SDHPDP0, METHODNOTE    No results found for: URICACID    No components found for: TSPOTTB      · PTH: 244    Rheum Labs:  RANJIT negative  · ANCA screen negative   · C3/4 within normal range  · Double-stranded DNA negative    Infectious Labs:  Hepatitis profile NR     Imaging:  All imaging reviewed and independently  interpreted by me.    Ultrasound lower extremity July 2019  No evidence of deep venous thrombosis in either lower extremity as visualized.      ASSESSMENT / PLAN:     Stephanie Wilder is a 78 y.o. Black or  female  with:    1. Rash  - not characteristic of vasculitis or connective tissue disorder rash  - Elephantiasis with edemadiagnosis given by Dermatology  - rheumatic autoimmune workup unrevealing.  - Hx of Venous insufficiency/lipodermatosclerosis.  LE weakness likely from deconditioning.  - continue management per PMD and HD per schedule for ESRD    2. Osteoarthritis - stable  - stable since last visit.  Able to perform ADL w/o particular difficulty.  - continue with range of motion, flexibility, and strengthening exercises.  - Acetaminophen prn -> standing up to 3 grams per day  - Topicals therapy: Capsaicin  - consider for corticosteroid injection if refractory and PT referral    3. Other specified counseling  - over 10 minutes spent regarding below topics:  - Immunization counseling done.  - Nutrition and exercise counseling.  - Regular exercise:  Range of motion and flexibility.  - Medication counseling provided.    No follow-ups on file.   RTC PRN    Method of contact with patient concerns: [facility]    Disclaimer:  This note is prepared using voice recognition software and as such is likely to have errors and has not been proof read. Please contact me for questions.     Time spent: 25 minutes in face to face discussion concerning diagnosis, prognosis, review of lab and test results, benefits of treatment as well as management of disease, counseling of patient and coordination of care between various health care providers.  Greater than half the time spent was used for coordination of care and counseling of patient.    Oneil Aldana M.D.  Rheumatology Department   [facility] [place]

## 2020-01-06 DIAGNOSIS — I73.9 PVD (PERIPHERAL VASCULAR DISEASE): Primary | ICD-10-CM

## 2020-01-09 ENCOUNTER — TELEPHONE (OUTPATIENT)
Dept: CARDIOLOGY | Facility: CLINIC | Age: 80
End: 2020-01-09

## 2020-01-09 NOTE — TELEPHONE ENCOUNTER
Call patient back to see if she would like to be scheduled to Dr. Hector. Pt. Agreed to see him and was scheduled at 4:00pm today.     ----- Message from Jean Bryant sent at 1/9/2020 12:44 PM CST -----  Contact: Anjana from [facility] [place]   Type:  Sooner Apoointment Request    Caller is requesting a sooner appointment.  Caller declined first available appointment listed below.  Caller will not accept being placed on the waitlist and is requesting a message be sent to doctor.  Name of Caller: pt   When is the first available appointment?04/20/20  Symptoms: 8 week follow up   Would the patient rather a call back or a response via [facility]? Phone   Best Call Back Number: [phone]   Additional Information:

## 2020-03-12 DIAGNOSIS — L98.9 SKIN LESIONS: Primary | ICD-10-CM

## 2020-03-30 ENCOUNTER — HOSPITAL ENCOUNTER (EMERGENCY)
Facility: HOSPITAL | Age: 80
Discharge: LEFT BEFORE DISCHARGE | End: 2020-03-30
Attending: EMERGENCY MEDICINE
Payer: MEDICARE

## 2020-03-30 VITALS
OXYGEN SATURATION: 100 % | SYSTOLIC BLOOD PRESSURE: 150 MMHG | HEIGHT: 66 IN | DIASTOLIC BLOOD PRESSURE: 64 MMHG | BODY MASS INDEX: 27.01 KG/M2 | TEMPERATURE: 99 F | HEART RATE: 83 BPM | RESPIRATION RATE: 38 BRPM

## 2020-03-30 DIAGNOSIS — N18.6 ESRD (END STAGE RENAL DISEASE): Primary | ICD-10-CM

## 2020-03-30 DIAGNOSIS — R06.00 DYSPNEA, UNSPECIFIED TYPE: ICD-10-CM

## 2020-03-30 DIAGNOSIS — T82.838A BLEEDING FROM DIALYSIS SHUNT, INITIAL ENCOUNTER: ICD-10-CM

## 2020-03-30 PROBLEM — T82.898A PROBLEM WITH DIALYSIS ACCESS: Status: ACTIVE | Noted: 2020-03-30

## 2020-03-30 PROBLEM — R09.02 HYPOXEMIA: Status: ACTIVE | Noted: 2020-03-30

## 2020-03-30 LAB
ALBUMIN SERPL BCP-MCNC: 3.5 G/DL (ref 3.5–5.2)
ALLENS TEST: ABNORMAL
ALP SERPL-CCNC: 46 U/L (ref 55–135)
ALT SERPL W/O P-5'-P-CCNC: 16 U/L (ref 10–44)
ANION GAP SERPL CALC-SCNC: 11 MMOL/L (ref 8–16)
AST SERPL-CCNC: 19 U/L (ref 10–40)
BASOPHILS # BLD AUTO: 0.01 K/UL (ref 0–0.2)
BASOPHILS NFR BLD: 0.1 % (ref 0–1.9)
BILIRUB SERPL-MCNC: 0.4 MG/DL (ref 0.1–1)
BUN SERPL-MCNC: 10 MG/DL (ref 8–23)
CALCIUM SERPL-MCNC: 9.8 MG/DL (ref 8.7–10.5)
CHLORIDE SERPL-SCNC: 99 MMOL/L (ref 95–110)
CO2 SERPL-SCNC: 26 MMOL/L (ref 23–29)
CREAT SERPL-MCNC: 3.3 MG/DL (ref 0.5–1.4)
DELSYS: ABNORMAL
DIFFERENTIAL METHOD: ABNORMAL
EOSINOPHIL # BLD AUTO: 0.2 K/UL (ref 0–0.5)
EOSINOPHIL NFR BLD: 2.4 % (ref 0–8)
ERYTHROCYTE [DISTWIDTH] IN BLOOD BY AUTOMATED COUNT: 14.1 % (ref 11.5–14.5)
EST. GFR  (AFRICAN AMERICAN): 15 ML/MIN/1.73 M^2
EST. GFR  (NON AFRICAN AMERICAN): 13 ML/MIN/1.73 M^2
FIO2: 28
FLOW: 2
GLUCOSE SERPL-MCNC: 125 MG/DL (ref 70–110)
HCO3 UR-SCNC: 29 MMOL/L (ref 24–28)
HCT VFR BLD AUTO: 28.7 % (ref 37–48.5)
HGB BLD-MCNC: 9 G/DL (ref 12–16)
IMM GRANULOCYTES # BLD AUTO: 0.03 K/UL (ref 0–0.04)
IMM GRANULOCYTES NFR BLD AUTO: 0.4 % (ref 0–0.5)
LYMPHOCYTES # BLD AUTO: 0.9 K/UL (ref 1–4.8)
LYMPHOCYTES NFR BLD: 11.3 % (ref 18–48)
MCH RBC QN AUTO: 33.6 PG (ref 27–31)
MCHC RBC AUTO-ENTMCNC: 31.4 G/DL (ref 32–36)
MCV RBC AUTO: 107 FL (ref 82–98)
MODE: ABNORMAL
MONOCYTES # BLD AUTO: 0.9 K/UL (ref 0.3–1)
MONOCYTES NFR BLD: 11.3 % (ref 4–15)
NEUTROPHILS # BLD AUTO: 5.9 K/UL (ref 1.8–7.7)
NEUTROPHILS NFR BLD: 74.5 % (ref 38–73)
NRBC BLD-RTO: 0 /100 WBC
PCO2 BLDA: 41.8 MMHG (ref 35–45)
PH SMN: 7.45 [PH] (ref 7.35–7.45)
PLATELET # BLD AUTO: 172 K/UL (ref 150–350)
PMV BLD AUTO: 9.7 FL (ref 9.2–12.9)
PO2 BLDA: 104 MMHG (ref 80–100)
POC BE: 5 MMOL/L
POC SATURATED O2: 98 % (ref 95–100)
POTASSIUM SERPL-SCNC: 3.6 MMOL/L (ref 3.5–5.1)
PROT SERPL-MCNC: 8 G/DL (ref 6–8.4)
RBC # BLD AUTO: 2.68 M/UL (ref 4–5.4)
SAMPLE: ABNORMAL
SITE: ABNORMAL
SODIUM SERPL-SCNC: 136 MMOL/L (ref 136–145)
WBC # BLD AUTO: 7.88 K/UL (ref 3.9–12.7)

## 2020-03-30 PROCEDURE — 80053 COMPREHEN METABOLIC PANEL: CPT

## 2020-03-30 PROCEDURE — 99284 EMERGENCY DEPT VISIT MOD MDM: CPT | Mod: ,,, | Performed by: INTERNAL MEDICINE

## 2020-03-30 PROCEDURE — 85025 COMPLETE CBC W/AUTO DIFF WBC: CPT

## 2020-03-30 PROCEDURE — 99284 PR EMERGENCY DEPT VISIT,LEVEL IV: ICD-10-PCS | Mod: ,,, | Performed by: INTERNAL MEDICINE

## 2020-03-30 PROCEDURE — 25000003 PHARM REV CODE 250: Performed by: EMERGENCY MEDICINE

## 2020-03-30 PROCEDURE — 12001 RPR S/N/AX/GEN/TRNK 2.5CM/<: CPT

## 2020-03-30 PROCEDURE — 36600 WITHDRAWAL OF ARTERIAL BLOOD: CPT | Mod: 59

## 2020-03-30 PROCEDURE — 82803 BLOOD GASES ANY COMBINATION: CPT

## 2020-03-30 PROCEDURE — 99285 EMERGENCY DEPT VISIT HI MDM: CPT | Mod: 25

## 2020-03-30 PROCEDURE — 36415 COLL VENOUS BLD VENIPUNCTURE: CPT

## 2020-03-30 RX ORDER — LIDOCAINE HYDROCHLORIDE 10 MG/ML
10 INJECTION, SOLUTION EPIDURAL; INFILTRATION; INTRACAUDAL; PERINEURAL
Status: COMPLETED | OUTPATIENT
Start: 2020-03-30 | End: 2020-03-30

## 2020-03-30 RX ADMIN — LIDOCAINE HYDROCHLORIDE 100 MG: 10 INJECTION, SOLUTION EPIDURAL; INFILTRATION; INTRACAUDAL; PERINEURAL at 05:03

## 2020-03-30 NOTE — ED PROVIDER NOTES
SCRIBE #1 NOTE: I, Mayo Campbell, am scribing for, and in the presence of, Richard Bedoya Do, MD. I have scribed the entire note.       History     Chief Complaint   Patient presents with    Vascular Access Problem     shunt bleeding, patient also recently diagnosed with pneamonia      Review of patient's allergies indicates:  No Known Allergies      History of Present Illness     HPI    3/30/2020, 5:21 PM  History obtained from the patient      History of Present Illness: Stephanie Wilder is a 79 y.o. female patient with a PMHx of anemia, constipation, dementia, DM, diabetic retinopathy, ESRD (T, TH, Sat dialysis), GERD, HTN, neuropathy, PNA, and traction detachment of left retina who presents to the Emergency Department for evaluation of a vascular access problem which onset gradually earlier today. Pt was seen at [facility] for pneumonia, and was discharged today. Pt went to dialysis and then ahd bleeding from her shunt. A dressing was placed and pt was sent here for evaluation. Symptoms are constant and moderate in severity. No mitigating or exacerbating factors reported. No associated sxs reported. Patient denies any fever, chills, CP, SOB, HA, and all other sxs at this time. No prior Tx reported. No further complaints or concerns at this time.       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       Past Surgical History:  Past Surgical History:   Procedure Laterality Date    CATARACT EXTRACTION      MASTECTOMY Right          Family History:  Family History   Problem Relation Age of Onset    Diabetes Maternal Aunt     Glaucoma  Maternal Aunt     Heart attack Maternal Aunt 80    Diabetes Paternal Aunt     Glaucoma Paternal Aunt     Diabetes Child     Heart attack Brother 79       Social History:  Social History     Tobacco Use    Smoking status: Never Smoker    Smokeless tobacco: Never Used   Substance and Sexual Activity    Alcohol use: No    Drug use: No    Sexual activity: Unknown        Review of Systems     Review of Systems   Constitutional: Negative for chills and fever.   HENT: Negative for sore throat.    Respiratory: Negative for cough and shortness of breath.    Cardiovascular: Negative for chest pain and leg swelling.   Gastrointestinal: Negative for abdominal pain, diarrhea, nausea and vomiting.   Musculoskeletal: Negative for back pain, neck pain and neck stiffness.   Skin: Positive for wound (Bleeding from RLE shunt). Negative for rash.   Neurological: Negative for dizziness, weakness, light-headedness, numbness and headaches.   All other systems reviewed and are negative.     Physical Exam     Initial Vitals [03/30/20 1616]   BP Pulse Resp Temp SpO2   (!) 180/74 80 16 99.2 °F (37.3 °C) (!) 85 %      MAP       --          Physical Exam  Nursing Notes and Vital Signs Reviewed.  Constitutional: Patient is in no acute distress. Well-developed and well-nourished.  Head: Atraumatic. Normocephalic.  Eyes: EOM intact. Conjunctivae are not pale. No scleral icterus.  ENT: Mucous membranes are moist. Oropharynx is clear and symmetric.    Neck: Supple. Full ROM.  Cardiovascular: Regular rate. Regular rhythm. No murmurs, rubs, or gallops. Distal pulses are 2+ and symmetric.  Pulmonary/Chest: No respiratory distress. Clear to auscultation bilaterally. No wheezing or rales.  Abdominal: Soft and non-distended. There is no tenderness to palpation. No rebound or guarding.  Genitourinary: No CVA tenderness  Musculoskeletal: Moves all extremities. No obvious deformities. No edema. No calf tenderness.  Skin: Warm and dry. There is a  "high pressure bleeding cut to the right thigh over the shunt.  Neurological:  Alert, awake, and appropriate.  Normal speech.  No acute focal neurological deficits are appreciated.  Psychiatric: Normal affect. Good eye contact. Appropriate in content.     ED Course   Lac Repair  Date/Time: 3/30/2020 5:31 PM  Performed by: Richard Bedoya Do, MD  Authorized by: Richard Bedoya Do, MD   Consent Done: Not Needed  Consent: Verbal consent obtained.  Risks and benefits: risks, benefits and alternatives were discussed  Consent given by: patient  Patient understanding: patient states understanding of the procedure being performed  Patient consent: the patient's understanding of the procedure matches consent given  Procedure consent: procedure consent matches procedure scheduled  Relevant documents: relevant documents present and verified  Imaging studies: imaging studies available  Required items: required blood products, implants, devices, and special equipment available  Patient identity confirmed:  and name  Time out: Immediately prior to procedure a "time out" was called to verify the correct patient, procedure, equipment, support staff and site/side marked as required.  Body area: lower extremity  Location details: right upper leg  Wound length (cm): Pinpoint.  Foreign bodies: no foreign bodies  Tendon involvement: none  Nerve involvement: none    Anesthesia:  Local Anesthetic: lidocaine 1% without epinephrine  Anesthetic total: 3 mL  Patient sedated: no  Preparation: Patient was prepped and draped in the usual sterile fashion.  Irrigation solution: saline  Irrigation method: syringe  Amount of cleaning: standard  Debridement: none  Degree of undermining: none  Skin closure: Ethilon (In addition to sutures, pressure held above and below shunt for hemostasis)  Number of sutures: 2  Suture technique: Figure 8.  Approximation: close  Patient tolerance: Patient tolerated the procedure well with no immediate " "complications        ED Vital Signs:  Vitals:    03/30/20 1616 03/30/20 1645 03/30/20 1717 03/30/20 1740   BP: (!) 180/74 129/62     Pulse: 80 81 82 82   Resp: 16 (!) 25 (!) 28 (!) 33   Temp: 99.2 °F (37.3 °C)      TempSrc: Oral      SpO2: (!) 85% 96% (!) 90% (!) 88%   Height: 5' 6" (1.676 m)       03/30/20 1750 03/30/20 1823 03/30/20 1838   BP:  (!) 150/64    Pulse: 82 81 83   Resp: (!) 21 (!) 25 (!) 38   Temp:      TempSrc:      SpO2: 96% 99% 100%   Height:          Abnormal Lab Results:  Labs Reviewed   CBC W/ AUTO DIFFERENTIAL - Abnormal; Notable for the following components:       Result Value    RBC 2.68 (*)     Hemoglobin 9.0 (*)     Hematocrit 28.7 (*)     Mean Corpuscular Volume 107 (*)     Mean Corpuscular Hemoglobin 33.6 (*)     Mean Corpuscular Hemoglobin Conc 31.4 (*)     Lymph # 0.9 (*)     Gran% 74.5 (*)     Lymph% 11.3 (*)     All other components within normal limits   COMPREHENSIVE METABOLIC PANEL - Abnormal; Notable for the following components:    Glucose 125 (*)     Creatinine 3.3 (*)     Alkaline Phosphatase 46 (*)     eGFR if  15 (*)     eGFR if non  13 (*)     All other components within normal limits   ISTAT PROCEDURE - Abnormal; Notable for the following components:    POC PO2 104 (*)     POC HCO3 29.0 (*)     All other components within normal limits        All Lab Results:  Results for orders placed or performed during the hospital encounter of 03/30/20   CBC auto differential   Result Value Ref Range    WBC 7.88 3.90 - 12.70 K/uL    RBC 2.68 (L) 4.00 - 5.40 M/uL    Hemoglobin 9.0 (L) 12.0 - 16.0 g/dL    Hematocrit 28.7 (L) 37.0 - 48.5 %    Mean Corpuscular Volume 107 (H) 82 - 98 fL    Mean Corpuscular Hemoglobin 33.6 (H) 27.0 - 31.0 pg    Mean Corpuscular Hemoglobin Conc 31.4 (L) 32.0 - 36.0 g/dL    RDW 14.1 11.5 - 14.5 %    Platelets 172 150 - 350 K/uL    MPV 9.7 9.2 - 12.9 fL    Immature Granulocytes 0.4 0.0 - 0.5 %    Gran # (ANC) 5.9 1.8 - 7.7 K/uL    " Immature Grans (Abs) 0.03 0.00 - 0.04 K/uL    Lymph # 0.9 (L) 1.0 - 4.8 K/uL    Mono # 0.9 0.3 - 1.0 K/uL    Eos # 0.2 0.0 - 0.5 K/uL    Baso # 0.01 0.00 - 0.20 K/uL    nRBC 0 0 /100 WBC    Gran% 74.5 (H) 38.0 - 73.0 %    Lymph% 11.3 (L) 18.0 - 48.0 %    Mono% 11.3 4.0 - 15.0 %    Eosinophil% 2.4 0.0 - 8.0 %    Basophil% 0.1 0.0 - 1.9 %    Differential Method Automated    Comprehensive metabolic panel   Result Value Ref Range    Sodium 136 136 - 145 mmol/L    Potassium 3.6 3.5 - 5.1 mmol/L    Chloride 99 95 - 110 mmol/L    CO2 26 23 - 29 mmol/L    Glucose 125 (H) 70 - 110 mg/dL    BUN, Bld 10 8 - 23 mg/dL    Creatinine 3.3 (H) 0.5 - 1.4 mg/dL    Calcium 9.8 8.7 - 10.5 mg/dL    Total Protein 8.0 6.0 - 8.4 g/dL    Albumin 3.5 3.5 - 5.2 g/dL    Total Bilirubin 0.4 0.1 - 1.0 mg/dL    Alkaline Phosphatase 46 (L) 55 - 135 U/L    AST 19 10 - 40 U/L    ALT 16 10 - 44 U/L    Anion Gap 11 8 - 16 mmol/L    eGFR if African American 15 (A) >60 mL/min/1.73 m^2    eGFR if non African American 13 (A) >60 mL/min/1.73 m^2   ISTAT PROCEDURE   Result Value Ref Range    POC PH 7.450 7.35 - 7.45    POC PCO2 41.8 35 - 45 mmHg    POC PO2 104 (H) 80 - 100 mmHg    POC HCO3 29.0 (H) 24 - 28 mmol/L    POC BE 5 -2 to 2 mmol/L    POC SATURATED O2 98 95 - 100 %    Sample ARTERIAL     Site RR     Allens Test Pass     DelSys Nasal Can     Mode SPONT     Flow 2     FiO2 28        Imaging Results:  Imaging Results          X-Ray Chest AP Portable (Final result)  Result time 03/30/20 18:39:57    Final result by Hamlet Castanon MD (03/30/20 18:39:57)                 Impression:      1.  Moderate vascular congestion.  Small right effusion.  Ground-glass opacity within the periphery of the mid lower right lung.  Differential considerations would include mixed interstitial and alveolar pulmonary edema that is mildly asymmetric, versus interstitial infectious process/atypical pneumonia with resulting right pleural effusion.  Clinical correlation is  advised.    2.  Stable findings as noted above.      Electronically signed by: Hamlet Castanon MD  Date:    03/30/2020  Time:    18:39             Narrative:    EXAMINATION:  XR CHEST AP PORTABLE    CLINICAL HISTORY:  hypoxia;    COMPARISON:  March 3, 2016    FINDINGS:  There are ground-glass opacities within the periphery of the mid and lower right lung.  Stable bilateral upper lobe scarring.  Small right effusion.  The lungs are otherwise clear.  The cardiac silhouette size is enlarged.  The trachea is midline and the mediastinal width is normal. Negative for left effusion or pneumothorax.  Pulmonary vasculature is moderately congested.  Negative for osseous abnormalities. Stable deviation of the trachea to the right due to the presence of a left thyroid mass.  Convex left curvature of the thoracic spine with marginal spondylosis.  Tortuous aorta with calcifications of the aortic knob.  Bilateral axillary postoperative changes with a stent on the left.                                        The Emergency Provider reviewed the vital signs and test results, which are outlined above.     ED Discussion     6:10 PM: Charts reviewed. Talked to Dr. Carr for further feedback. Pt had bilateral interstitial infiltrates; pt was admitted to [facility] on the 29th and discharged today with 91% O2 sat's on RA. Pt has a negative COVID-19 panel. Pt was dialyzed today and they removed 2 liters. Pt refused blood cultures, antibiotics, and all care in their ED.    7:29 PM: Pt is A&O x3, appropriate, and of capacity at this time. Pt voices desire to leave hospital. D/w pt in length need for further evaluation and treatment due to HPI and PEx. Pt declines any further evaluation or tx at this time. All risks, including worsening sx, permanent bodily harm and death, were discussed in length. Pt acknowledges all risk at this time and agrees to sign AMA form. Pt given RTER instructions. All questions and concerns addressed at this time. Pt leaving  [facility].        Medical Decision Making:   Clinical Tests:   Lab Tests: Ordered and Reviewed  Radiological Study: Ordered and Reviewed           ED Medication(s):  Medications   lidocaine (PF) 10 mg/ml (1%) injection 100 mg (100 mg Infiltration Given 3/30/20 1715)       New Prescriptions    No medications on file               Scribe Attestation:   Scribe #1: I performed the above scribed service and the documentation accurately describes the services I performed. I attest to the accuracy of the note.     Attending:   Physician Attestation Statement for Scribe #1: I, Richard Bedoya Do, MD, personally performed the services described in this documentation, as scribed by Mayo Campbell, in my presence, and it is both accurate and complete.           Clinical Impression       ICD-10-CM ICD-9-CM   1. ESRD (end stage renal disease) N18.6 585.6   2. Bleeding from dialysis shunt, initial encounter T82.838A 996.73     459.0   3. Dyspnea, unspecified type R06.00 786.09       Disposition:   Disposition: VADIM Bedoya Do, MD  03/30/20 2006

## 2020-03-31 NOTE — HPI
Patient is a 79-year-old female with ESRD on hemodialysis under care of Dr. Cervantes and the [facility] D[facility] unit on [address].  Her dialysis days are Monday Wednesday Friday.  Patient was admitted to the [facility] on Friday with fevers and chills.  Patient ruled out for Coronavirus infection--COVID-19 tests were confirm negative.  Patient refused sepsis workup in the [facility].  Patient was discharged to the dialysis unit earlier today.  Patient went to the dialysis unit had 2.2 kg taken off.  Patient has chronic pulmonary hypertension and chronic lung infiltrates/fibrosis.  Patient had bleeding from the right thigh graft after the dialysis.  Patient came to the emergency room for bleeding graft which required stitches.  Patient was noted to have oxygen saturation down-88%.  On room air.  Patient seen and evaluated at the bedside.  High recommended the patient to be further worked up for hypoxemia and kept overnight for observation.  Patient refused patient wanted to go home and not workup any further.  I have access the records for the dialysis unit and have confirmed the dialysis records from today.  I have also discussed with Hospital Medicine attending doctor Jazmine as well as our emergency room physician Dr. Sprague.

## 2020-03-31 NOTE — SUBJECTIVE & OBJECTIVE
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      Current Outpatient Medications   Medication    acetaminophen (TYLENOL) 325 MG tablet    amLODIPine (NORVASC) 10 MG tablet    aspirin (ECOTRIN) 81 MG EC tablet    carvedilol (COREG) 25 MG tablet    cetirizine (ZYRTEC) 10 MG tablet    clotrimazole-betamethasone (LOTRISONE) cream    cranberry fruit extract (CRANBERRY ORAL)    dicyclomine (BENTYL) 10 MG capsule    docusate sodium (COLACE) 100 MG capsule    doxycycline (VIBRAMYCIN) 100 MG Cap    ergocalciferol (VITAMIN D2) 50,000 unit Cap    erythromycin (ROMYCIN) ophthalmic ointment    escitalopram oxalate (LEXAPRO) 10 MG tablet    ethyl chloride 100% 100 % spray    ferrous sulfate 325 mg (65 mg iron) Tab tablet    gabapentin (NEURONTIN) 100 MG capsule    guaifenesin (MUCINEX) 600 mg 12 hr tablet    hydrALAZINE (APRESOLINE) 100 MG tablet    hydrocortisone (PREPARATION H HYDROCORTISONE) 1 % cream    hydrOXYzine pamoate (VISTARIL) 25 MG Cap    insulin asp prt-insulin aspart (NOVOLOG MIX 70-30) 100 unit/mL (70-30) Soln    insulin aspart U-100 (NOVOLOG) 100 unit/mL injection    insulin detemir (LEVEMIR) 100 unit/mL injection    lisinopril (PRINIVIL,ZESTRIL) 40 MG tablet    loperamide (IMODIUM) 2 mg capsule    midodrine (PROAMATINE) 5 MG Tab    mirtazapine (REMERON) 30 MG tablet    neomycin-polymyxin-dexamethasone (DEXACINE) 3.5 mg/g-10,000 unit/g-0.1 %  Oint    neomycin-polymyxin-dexamethasone (MAXITROL) 3.5mg/mL-10,000 unit/mL-0.1 % DrpS    olanzapine (ZYPREXA) 5 MG tablet    ondansetron (ZOFRAN) 4 MG tablet    pantoprazole (PROTONIX) 40 MG tablet    peg 400-propylene glycol, PF, (SYSTANE ULTRA, PF,) 0.4-0.3 % Dpet    polyethylene glycol (GLYCOLAX) 17 gram PwPk    pravastatin (PRAVACHOL) 40 MG tablet    sevelamer carbonate (RENVELA) 800 mg Tab    traMADol (ULTRAM) 50 mg tablet     Family History     Problem Relation (Age of Onset)    Diabetes Maternal Aunt, Paternal Aunt, Child    Glaucoma Maternal Aunt, Paternal Aunt    Heart attack Maternal Aunt (80), Brother (79)        Tobacco Use    Smoking status: Never Smoker    Smokeless tobacco: Never Used   Substance and Sexual Activity    Alcohol use: No    Drug use: No    Sexual activity: Not on file     Review of Systems   Constitutional: Negative for activity change, appetite change, chills, diaphoresis, fatigue, fever and unexpected weight change.   HENT: Negative for congestion, dental problem, drooling, postnasal drip, rhinorrhea and voice change.    Eyes: Negative for discharge.   Respiratory: Negative for apnea, cough, choking, chest tightness, shortness of breath, wheezing and stridor.    Cardiovascular: Negative for chest pain, palpitations and leg swelling.   Gastrointestinal: Negative for abdominal distention, blood in stool, constipation, diarrhea, nausea, rectal pain and vomiting.   Endocrine: Negative for cold intolerance, heat intolerance, polydipsia and polyuria.   Genitourinary: Negative for decreased urine volume, difficulty urinating, dysuria, enuresis, flank pain, frequency, hematuria and urgency.   Musculoskeletal: Negative for arthralgias, back pain, gait problem and joint swelling.   Skin: Negative for rash.   Allergic/Immunologic: Negative for food allergies and immunocompromised state.   Neurological: Negative for dizziness, tremors, syncope, numbness and headaches.    Hematological: Does not bruise/bleed easily.   Psychiatric/Behavioral: Negative for agitation, behavioral problems and self-injury. The patient is not nervous/anxious and is not hyperactive.    All other systems reviewed and are negative.    Objective:     Vital Signs (Most Recent):  Temp: 99.2 °F (37.3 °C) (03/30/20 1616)  Pulse: 83 (03/30/20 1838)  Resp: (!) 38 (03/30/20 1838)  BP: (!) 150/64 (03/30/20 1823)  SpO2: 100 % (03/30/20 1838)  O2 Device (Oxygen Therapy): nasal cannula (03/30/20 1750) Vital Signs (24h Range):  Temp:  [99.2 °F (37.3 °C)] 99.2 °F (37.3 °C)  Pulse:  [80-83] 83  Resp:  [16-38] 38  SpO2:  [85 %-100 %] 100 %  BP: (129-180)/(62-74) 150/64        Body mass index is 27.01 kg/m².  Body surface area is 1.88 meters squared.    No intake/output data recorded.    Physical Exam   Constitutional: She is oriented to person, place, and time. She appears well-developed and well-nourished.   HENT:   Head: Normocephalic and atraumatic.   Eyes: Pupils are equal, round, and reactive to light. Conjunctivae and EOM are normal.   Neck: Normal range of motion and full passive range of motion without pain. Neck supple. Carotid bruit is not present. No edema present. No thyroid mass and no thyromegaly present.   Cardiovascular: Normal rate, regular rhythm, S1 normal, S2 normal, intact distal pulses and normal pulses.  No extrasystoles are present. PMI is displaced. Exam reveals no friction rub.   Murmur heard.   Systolic murmur is present with a grade of 2/6.  RV heave loud P2    Pulmonary/Chest: Effort normal. No accessory muscle usage. No respiratory distress. She has no wheezes. She has no rales. She exhibits no tenderness.   Abdominal: Soft. Bowel sounds are normal. She exhibits no distension and no mass. There is no hepatosplenomegaly. There is no tenderness. There is no rebound and no CVA tenderness. No hernia.   Musculoskeletal: Normal range of motion. She exhibits no edema or tenderness.   Right thigh  graft is working with positive bruit and thrill and stop bleeding actively anymore.   Neurological: She is alert and oriented to person, place, and time. She has normal reflexes. No cranial nerve deficit or sensory deficit. She exhibits normal muscle tone. Coordination normal.   Skin: Skin is warm and dry. No bruising, no ecchymosis and no rash noted. No cyanosis or erythema. No pallor. Nails show no clubbing.   Psychiatric: She has a normal mood and affect. Her speech is normal and behavior is normal. Judgment and thought content normal.       Significant Labs:  All labs within the past 24 hours have been reviewed.    Significant Imaging:  Labs: Reviewed  X-Ray: Reviewed

## 2020-03-31 NOTE — ASSESSMENT & PLAN NOTE
Patient probably has interstitial lung disease which is showing lung infiltrates on chest x-ray.  Patient clinically stable at this time.  Patient is hypoxemic and probably qualifies to be on oxygen.  Patient is refusing the workup and observation status in the hospital and is calling the daughter for signing out AMA.  All findings discussed in detail with the patient and Dr. Sprague in the emergency room.

## 2020-03-31 NOTE — ASSESSMENT & PLAN NOTE
Patient has history of congestive heart failure and pulmonary hypertension.  Patient is refusing any further workup.  Patient will be signing out AMA as discussed with the emergency room physician Dr. Sprague.

## 2020-03-31 NOTE — ASSESSMENT & PLAN NOTE
Patient may need angioplasty.  Patient has had bleeding post dialysis which has stopped.  Graft is still functional and for should be okay to utilize as an outpatient

## 2020-03-31 NOTE — CONSULTS
[facility] -   Nephrology  Consult Note        Patient Name: Stephanie Wilder  MRN: [id number]  Admission Date: 3/30/2020  Hospital Length of Stay: 0 days  Attending Provider: Richard Bedoya Do, MD   Primary Care Physician: Garry Vazquez MD  Principal Problem:<principal problem not specified>    Consults  Subjective:     HPI: Patient is a 79-year-old female with ESRD on hemodialysis under care of Dr. Cervantes and the [facility] on [address].  Her dialysis days are Monday Wednesday Friday.  Patient was admitted to the [facility] on Friday with fevers and chills.  Patient ruled out for Coronavirus infection--COVID-19 tests were confirm negative.  Patient refused sepsis workup in the [facility].  Patient was discharged to the dialysis unit earlier today.  Patient went to the dialysis unit had 2.2 kg taken off.  Patient has chronic pulmonary hypertension and chronic lung infiltrates/fibrosis.  Patient had bleeding from the right thigh graft after the dialysis.  Patient came to the emergency room for bleeding graft which required stitches.  Patient was noted to have oxygen saturation down-88%.  On room air.  Patient seen and evaluated at the bedside.  High recommended the patient to be further worked up for hypoxemia and kept overnight for observation.  Patient refused patient wanted to go home and not workup any further.  I have access the records for the dialysis unit and have confirmed the dialysis records from today.  I have also discussed with Hospital Medicine attending doctor Jazmine as well as our emergency room physician Dr. Sprague.    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      Current Outpatient Medications   Medication    acetaminophen (TYLENOL) 325 MG tablet    amLODIPine (NORVASC) 10 MG tablet    aspirin (ECOTRIN) 81 MG EC tablet    carvedilol (COREG) 25 MG tablet    cetirizine (ZYRTEC) 10 MG tablet    clotrimazole-betamethasone (LOTRISONE) cream    cranberry fruit extract (CRANBERRY ORAL)    dicyclomine (BENTYL) 10 MG capsule    docusate sodium (COLACE) 100 MG capsule    doxycycline (VIBRAMYCIN) 100 MG Cap    ergocalciferol (VITAMIN D2) 50,000 unit Cap    erythromycin (ROMYCIN) ophthalmic ointment    escitalopram oxalate (LEXAPRO) 10 MG tablet    ethyl chloride 100% 100 % spray    ferrous sulfate 325 mg (65 mg iron) Tab tablet    gabapentin (NEURONTIN) 100 MG capsule    guaifenesin (MUCINEX) 600 mg 12 hr tablet    hydrALAZINE (APRESOLINE) 100 MG tablet    hydrocortisone (PREPARATION H HYDROCORTISONE) 1 % cream    hydrOXYzine pamoate (VISTARIL) 25 MG Cap    insulin asp prt-insulin aspart (NOVOLOG MIX 70-30) 100 unit/mL (70-30) Soln    insulin aspart U-100 (NOVOLOG) 100 unit/mL injection    insulin detemir (LEVEMIR) 100 unit/mL injection    lisinopril (PRINIVIL,ZESTRIL) 40 MG tablet    loperamide (IMODIUM) 2 mg capsule    midodrine (PROAMATINE) 5 MG Tab    mirtazapine (REMERON) 30 MG tablet    neomycin-polymyxin-dexamethasone (DEXACINE) 3.5 mg/g-10,000 unit/g-0.1 % Oint    neomycin-polymyxin-dexamethasone (MAXITROL) 3.5mg/mL-10,000 unit/mL-0.1 % DrpS    olanzapine (ZYPREXA) 5 MG tablet    ondansetron (ZOFRAN) 4 MG tablet    pantoprazole (PROTONIX) 40 MG tablet    peg 400-propylene glycol, PF, (SYSTANE ULTRA, PF,) 0.4-0.3 % Dpet    polyethylene glycol (GLYCOLAX) 17 gram PwPk    pravastatin (PRAVACHOL) 40  MG tablet    sevelamer carbonate (RENVELA) 800 mg Tab    traMADol (ULTRAM) 50 mg tablet     Family History     Problem Relation (Age of Onset)    Diabetes Maternal Aunt, Paternal Aunt, Child    Glaucoma Maternal Aunt, Paternal Aunt    Heart attack Maternal Aunt (80), Brother (79)        Tobacco Use    Smoking status: Never Smoker    Smokeless tobacco: Never Used   Substance and Sexual Activity    Alcohol use: No    Drug use: No    Sexual activity: Not on file     Review of Systems   Constitutional: Negative for activity change, appetite change, chills, diaphoresis, fatigue, fever and unexpected weight change.   HENT: Negative for congestion, dental problem, drooling, postnasal drip, rhinorrhea and voice change.    Eyes: Negative for discharge.   Respiratory: Negative for apnea, cough, choking, chest tightness, shortness of breath, wheezing and stridor.    Cardiovascular: Negative for chest pain, palpitations and leg swelling.   Gastrointestinal: Negative for abdominal distention, blood in stool, constipation, diarrhea, nausea, rectal pain and vomiting.   Endocrine: Negative for cold intolerance, heat intolerance, polydipsia and polyuria.   Genitourinary: Negative for decreased urine volume, difficulty urinating, dysuria, enuresis, flank pain, frequency, hematuria and urgency.   Musculoskeletal: Negative for arthralgias, back pain, gait problem and joint swelling.   Skin: Negative for rash.   Allergic/Immunologic: Negative for food allergies and immunocompromised state.   Neurological: Negative for dizziness, tremors, syncope, numbness and headaches.   Hematological: Does not bruise/bleed easily.   Psychiatric/Behavioral: Negative for agitation, behavioral problems and self-injury. The patient is not nervous/anxious and is not hyperactive.    All other systems reviewed and are negative.    Objective:     Vital Signs (Most Recent):  Temp: 99.2 °F (37.3 °C) (03/30/20 1616)  Pulse: 83 (03/30/20 1838)  Resp: (!)  38 (03/30/20 1838)  BP: (!) 150/64 (03/30/20 1823)  SpO2: 100 % (03/30/20 1838)  O2 Device (Oxygen Therapy): nasal cannula (03/30/20 1750) Vital Signs (24h Range):  Temp:  [99.2 °F (37.3 °C)] 99.2 °F (37.3 °C)  Pulse:  [80-83] 83  Resp:  [16-38] 38  SpO2:  [85 %-100 %] 100 %  BP: (129-180)/(62-74) 150/64        Body mass index is 27.01 kg/m².  Body surface area is 1.88 meters squared.    No intake/output data recorded.    Physical Exam   Constitutional: She is oriented to person, place, and time. She appears well-developed and well-nourished.   HENT:   Head: Normocephalic and atraumatic.   Eyes: Pupils are equal, round, and reactive to light. Conjunctivae and EOM are normal.   Neck: Normal range of motion and full passive range of motion without pain. Neck supple. Carotid bruit is not present. No edema present. No thyroid mass and no thyromegaly present.   Cardiovascular: Normal rate, regular rhythm, S1 normal, S2 normal, intact distal pulses and normal pulses.  No extrasystoles are present. PMI is displaced. Exam reveals no friction rub.   Murmur heard.   Systolic murmur is present with a grade of 2/6.  RV heave loud P2    Pulmonary/Chest: Effort normal. No accessory muscle usage. No respiratory distress. She has no wheezes. She has no rales. She exhibits no tenderness.   Abdominal: Soft. Bowel sounds are normal. She exhibits no distension and no mass. There is no hepatosplenomegaly. There is no tenderness. There is no rebound and no CVA tenderness. No hernia.   Musculoskeletal: Normal range of motion. She exhibits no edema or tenderness.   Right thigh graft is working with positive bruit and thrill and stop bleeding actively anymore.   Neurological: She is alert and oriented to person, place, and time. She has normal reflexes. No cranial nerve deficit or sensory deficit. She exhibits normal muscle tone. Coordination normal.   Skin: Skin is warm and dry. No bruising, no ecchymosis and no rash noted. No cyanosis or  erythema. No pallor. Nails show no clubbing.   Psychiatric: She has a normal mood and affect. Her speech is normal and behavior is normal. Judgment and thought content normal.       Significant Labs:  All labs within the past 24 hours have been reviewed.    Significant Imaging:  Labs: Reviewed  X-Ray: Reviewed    Assessment/Plan:     Problem with dialysis access  Patient may need angioplasty.  Patient has had bleeding post dialysis which has stopped.  Graft is still functional and for should be okay to utilize as an outpatient    Hypoxemia  Patient has history of congestive heart failure and pulmonary hypertension.  Patient is refusing any further workup.  Patient will be signing out AMA as discussed with the emergency room physician Dr. Sprague.    ILD (interstitial lung disease)  Patient probably has interstitial lung disease which is showing lung infiltrates on chest x-ray.  Patient clinically stable at this time.  Patient is hypoxemic and probably qualifies to be on oxygen.  Patient is refusing the workup and observation status in the hospital and is calling the daughter for signing out AMA.  All findings discussed in detail with the patient and Dr. Sprague in the emergency room.        Thank you for your consult.     Aisha Carr MD   Nephrology  [facility]

## 2020-06-10 ENCOUNTER — HOSPITAL ENCOUNTER (EMERGENCY)
Facility: HOSPITAL | Age: 80
Discharge: HOME | End: 2020-06-10
Attending: EMERGENCY MEDICINE
Payer: MEDICARE

## 2020-06-10 VITALS
OXYGEN SATURATION: 90 % | TEMPERATURE: 98 F | WEIGHT: 155.13 LBS | RESPIRATION RATE: 18 BRPM | DIASTOLIC BLOOD PRESSURE: 91 MMHG | SYSTOLIC BLOOD PRESSURE: 211 MMHG | HEIGHT: 62 IN | HEART RATE: 85 BPM | BODY MASS INDEX: 28.55 KG/M2

## 2020-06-10 DIAGNOSIS — T82.838A BLEEDING FROM DIALYSIS SHUNT, INITIAL ENCOUNTER: Primary | ICD-10-CM

## 2020-06-10 LAB
APTT BLDCRRT: 26.2 SEC (ref 21–32)
BASOPHILS # BLD AUTO: 0.02 K/UL (ref 0–0.2)
BASOPHILS NFR BLD: 0.3 % (ref 0–1.9)
DIFFERENTIAL METHOD: ABNORMAL
EOSINOPHIL # BLD AUTO: 0.2 K/UL (ref 0–0.5)
EOSINOPHIL NFR BLD: 2.6 % (ref 0–8)
ERYTHROCYTE [DISTWIDTH] IN BLOOD BY AUTOMATED COUNT: 16 % (ref 11.5–14.5)
HCT VFR BLD AUTO: 44.8 % (ref 37–48.5)
HGB BLD-MCNC: 13.6 G/DL (ref 12–16)
IMM GRANULOCYTES # BLD AUTO: 0.02 K/UL (ref 0–0.04)
IMM GRANULOCYTES NFR BLD AUTO: 0.3 % (ref 0–0.5)
INR PPP: 1 (ref 0.8–1.2)
LYMPHOCYTES # BLD AUTO: 0.8 K/UL (ref 1–4.8)
LYMPHOCYTES NFR BLD: 13.3 % (ref 18–48)
MCH RBC QN AUTO: 33.3 PG (ref 27–31)
MCHC RBC AUTO-ENTMCNC: 30.4 G/DL (ref 32–36)
MCV RBC AUTO: 110 FL (ref 82–98)
MONOCYTES # BLD AUTO: 0.8 K/UL (ref 0.3–1)
MONOCYTES NFR BLD: 13.4 % (ref 4–15)
NEUTROPHILS # BLD AUTO: 4.1 K/UL (ref 1.8–7.7)
NEUTROPHILS NFR BLD: 70.1 % (ref 38–73)
NRBC BLD-RTO: 0 /100 WBC
PLATELET # BLD AUTO: 131 K/UL (ref 150–350)
PMV BLD AUTO: 10.1 FL (ref 9.2–12.9)
PROTHROMBIN TIME: 10.7 SEC (ref 9–12.5)
RBC # BLD AUTO: 4.08 M/UL (ref 4–5.4)
WBC # BLD AUTO: 5.88 K/UL (ref 3.9–12.7)

## 2020-06-10 PROCEDURE — 25000003 PHARM REV CODE 250: Performed by: EMERGENCY MEDICINE

## 2020-06-10 PROCEDURE — 85610 PROTHROMBIN TIME: CPT

## 2020-06-10 PROCEDURE — 36415 COLL VENOUS BLD VENIPUNCTURE: CPT

## 2020-06-10 PROCEDURE — 99283 EMERGENCY DEPT VISIT LOW MDM: CPT

## 2020-06-10 PROCEDURE — 85025 COMPLETE CBC W/AUTO DIFF WBC: CPT

## 2020-06-10 PROCEDURE — 85730 THROMBOPLASTIN TIME PARTIAL: CPT

## 2020-06-10 RX ORDER — CLONIDINE HYDROCHLORIDE 0.1 MG/1
0.1 TABLET ORAL
Status: DISCONTINUED | OUTPATIENT
Start: 2020-06-10 | End: 2020-06-10 | Stop reason: HOSPADM

## 2020-06-10 RX ORDER — SILVER NITRATE 38.21; 12.74 MG/1; MG/1
1 STICK TOPICAL
Status: COMPLETED | OUTPATIENT
Start: 2020-06-10 | End: 2020-06-10

## 2020-06-10 RX ADMIN — SILVER NITRATE 1 APPLICATOR: 38.21; 12.74 STICK TOPICAL at 04:06

## 2020-06-10 NOTE — ED NOTES
Patient placed on continuous automatic blood pressure cuff and continuous pulse oximeter. Pt refused cardiac monitor an michael.

## 2020-06-10 NOTE — ED PROVIDER NOTES
SCRIBE #1 NOTE: I, Van Nagel, am scribing for, and in the presence of, Bennie Giordano Jr., MD. I have scribed the entire note.      History      Chief Complaint   Patient presents with    Vascular Access Problem     bleeding from HD fistula to right upper thigh       Review of patient's allergies indicates:  No Known Allergies     HPI   HPI    6/10/2020, 4:01 PM   History obtained from the patient and [facility]      History of Present Illness: Stephanie Wilder is a 79 y.o. female patient with a PMHx of ESRD who presents to the Emergency Department for vascular access problem, onset just PTA after dialysis. Pt states that after dialysis her R thigh fistula would not stop bleeding. Pt was able to complete dialysis today. Symptoms are constant and moderate in severity. No mitigating or exacerbating factors reported. No associated sxs reported. Patient denies any fever, chills, n/v/d, SOB, CP, weakness, numbness, dizziness, headache, and all other sxs at this time. [facility] placed pressure dressing to the fistula site PTA. No further complaints or concerns at this time.     Arrival mode: [facility]    PCP: Garry Vazquez MD       Past Medical History:  No past medical history on file.    Past Surgical History:  No past surgical history on file.      Family History:  No family history on file.    Social History:  Social History     Tobacco Use    Smoking status: Not on file   Substance and Sexual Activity    Alcohol use: Not on file    Drug use: Not on file    Sexual activity: Not on file       ROS   Review of Systems   Constitutional: Negative for chills, diaphoresis, fatigue and fever.   HENT: Negative for sore throat.    Respiratory: Negative for shortness of breath.    Cardiovascular: Negative for chest pain.   Gastrointestinal: Negative for diarrhea, nausea and vomiting.   Genitourinary: Negative for dysuria.   Musculoskeletal: Negative for back pain.   Skin: Negative for rash.   Neurological: Negative for dizziness,  seizures, weakness, light-headedness, numbness and headaches.   Hematological: Does not bruise/bleed easily.   All other systems reviewed and are negative.    Physical Exam      Initial Vitals   BP Pulse Resp Temp SpO2   06/10/20 1553 06/10/20 1551 06/10/20 1551 06/10/20 1551 06/10/20 1551   (!) 213/89 81 18 98.4 °F (36.9 °C) (!) 94 %      MAP       --                 Physical Exam  Nursing Notes and Vital Signs Reviewed.  Constitutional: Patient is in no acute distress. Well-developed and well-nourished.  Head: Atraumatic. Normocephalic.  Eyes: . EOM intact. Conjunctivae are not pale. No scleral icterus.  ENT: Mucous membranes are moist. Oropharynx is clear and symmetric.    Neck: Supple. Full ROM.   Cardiovascular: Regular rate. Regular rhythm. No murmurs, rubs, or gallops. Distal pulses are 2+ and symmetric.  Pulmonary/Chest: No respiratory distress. Clear to auscultation bilaterally. No wheezing or rales.  Abdominal: Soft and non-distended.  There is no tenderness.  No rebound, guarding, or rigidity.   Musculoskeletal: Moves all extremities. No obvious deformities. No edema.  Skin: Warm and dry.  There is a pair of shunts to the right leg.  The medial shot is a bandage and is not bleeding.  Lateral shot has some mild ooze from the puncture site.  This did not improve with pressure.  Did not improved with silver nitrate.  It did improve with hemostatic dressing placed here however.  After hemostasis was obtained, the patient was noted to have a good thrill in both shunts.  Neurovascular intact at baseline.  Neurological:  Alert, awake, and appropriate.  Normal speech.  No acute focal neurological deficits are appreciated.  Psychiatric: Normal affect. Good eye contact. Appropriate in content.    ED Course    Procedures  ED Vital Signs:  Vitals:    06/10/20 1551 06/10/20 1553 06/10/20 1554 06/10/20 1600   BP:  (!) 213/89     Pulse: 81 81     Resp: 18      Temp: 98.4 °F (36.9 °C)      TempSrc: Oral      SpO2: (!)  "94% (!) 90%     Weight:    70.4 kg (155 lb 1.6 oz)   Height:   5' 2" (1.575 m)        Abnormal Lab Results:  Labs Reviewed   CBC W/ AUTO DIFFERENTIAL - Abnormal; Notable for the following components:       Result Value    Mean Corpuscular Volume 110 (*)     Mean Corpuscular Hemoglobin 33.3 (*)     Mean Corpuscular Hemoglobin Conc 30.4 (*)     RDW 16.0 (*)     Platelets 131 (*)     Lymph # 0.8 (*)     Lymph% 13.3 (*)     All other components within normal limits   PROTIME-INR   APTT        All Lab Results:  Results for orders placed or performed during the hospital encounter of 06/10/20   CBC auto differential   Result Value Ref Range    WBC 5.88 3.90 - 12.70 K/uL    RBC 4.08 4.00 - 5.40 M/uL    Hemoglobin 13.6 12.0 - 16.0 g/dL    Hematocrit 44.8 37.0 - 48.5 %    Mean Corpuscular Volume 110 (H) 82 - 98 fL    Mean Corpuscular Hemoglobin 33.3 (H) 27.0 - 31.0 pg    Mean Corpuscular Hemoglobin Conc 30.4 (L) 32.0 - 36.0 g/dL    RDW 16.0 (H) 11.5 - 14.5 %    Platelets 131 (L) 150 - 350 K/uL    MPV 10.1 9.2 - 12.9 fL    Immature Granulocytes 0.3 0.0 - 0.5 %    Gran # (ANC) 4.1 1.8 - 7.7 K/uL    Immature Grans (Abs) 0.02 0.00 - 0.04 K/uL    Lymph # 0.8 (L) 1.0 - 4.8 K/uL    Mono # 0.8 0.3 - 1.0 K/uL    Eos # 0.2 0.0 - 0.5 K/uL    Baso # 0.02 0.00 - 0.20 K/uL    nRBC 0 0 /100 WBC    Gran% 70.1 38.0 - 73.0 %    Lymph% 13.3 (L) 18.0 - 48.0 %    Mono% 13.4 4.0 - 15.0 %    Eosinophil% 2.6 0.0 - 8.0 %    Basophil% 0.3 0.0 - 1.9 %    Differential Method Automated      Imaging Results:  Imaging Results    None                 The Emergency Provider reviewed the vital signs and test results, which are outlined above.    ED Discussion     4:04 PM: Bleeding has not improved with pressure or silver nitrate. Placed Quick Clot to fistula site.     4:50 PM: Reassessed pt at this time. Pt's bleeding has stopped at this time. Discussed with pt all pertinent ED information and results. Discussed pt dx and plan of tx. Gave pt all f/u and " return to the ED instructions. All questions and concerns were addressed at this time. Pt expresses understanding of information and instructions, and is comfortable with plan to discharge. Pt is stable for discharge.    I discussed with patient and/or family/caretaker that evaluation in the ED does not suggest any emergent or life threatening medical conditions requiring immediate intervention beyond what was provided in the ED, and I believe patient is safe for discharge.  Regardless, an unremarkable evaluation in the ED does not preclude the development or presence of a serious of life threatening condition. As such, patient was instructed to return immediately for any worsening or change in current symptoms.         ED Medication(s):  Medications   silver nitrate applicators applicator 1 applicator (1 applicator Topical (Top) Given by Other 6/10/20 1600)     New Prescriptions    No medications on file     Follow-up Information     Garry Vazquez MD In 1 day.    Specialty:  Internal Medicine  Contact information:  [address]  [address]  [facility]  [place] [id number] [phone]                     Medical Decision Making    Medical Decision Making:   Clinical Tests:   Lab Tests: Ordered and Reviewed           Scribe Attestation:   Scribe #1: I performed the above scribed service and the documentation accurately describes the services I performed. I attest to the accuracy of the note.    Attending:   Physician Attestation Statement for Scribe #1: I, Bennie Giordano Jr., MD, personally performed the services described in this documentation, as scribed by Van Nagel, in my presence, and it is both accurate and complete.          Clinical Impression       ICD-10-CM ICD-9-CM   1. Bleeding from dialysis shunt, initial encounter T82.838A 996.73     459.0       Disposition:   Disposition: Discharged  Condition: Stable         Bennie Giordano Jr., MD  06/10/20 1657

## 2020-06-10 NOTE — ED NOTES
Patient refusing reassessment of vitals. Refusing BP medications. Dr. Giordano aware and states patient is okay for discharge.

## 2020-06-16 ENCOUNTER — OFFICE VISIT (OUTPATIENT)
Dept: CARDIOLOGY | Facility: CLINIC | Age: 80
End: 2020-06-16
Payer: MEDICARE

## 2020-06-16 VITALS
HEART RATE: 70 BPM | HEIGHT: 66 IN | SYSTOLIC BLOOD PRESSURE: 140 MMHG | DIASTOLIC BLOOD PRESSURE: 70 MMHG | OXYGEN SATURATION: 94 % | WEIGHT: 167 LBS | BODY MASS INDEX: 26.84 KG/M2

## 2020-06-16 DIAGNOSIS — I15.0 RENOVASCULAR HYPERTENSION: ICD-10-CM

## 2020-06-16 DIAGNOSIS — N18.6 PULMONARY HYPERTENSION ASSOCIATED WITH END-STAGE RENAL DISEASE (ESRD) ON DIALYSIS: ICD-10-CM

## 2020-06-16 DIAGNOSIS — I73.9 PVD (PERIPHERAL VASCULAR DISEASE): Primary | ICD-10-CM

## 2020-06-16 DIAGNOSIS — M79.3 LIPODERMATOSCLEROSIS OF BOTH LOWER EXTREMITIES: ICD-10-CM

## 2020-06-16 DIAGNOSIS — N18.6 END STAGE RENAL DISEASE: ICD-10-CM

## 2020-06-16 DIAGNOSIS — I95.3 HEMODIALYSIS-ASSOCIATED HYPOTENSION: ICD-10-CM

## 2020-06-16 DIAGNOSIS — I27.29 PULMONARY HYPERTENSION ASSOCIATED WITH END-STAGE RENAL DISEASE (ESRD) ON DIALYSIS: ICD-10-CM

## 2020-06-16 DIAGNOSIS — J98.4 CRLD (CHRONIC RESTRICTIVE LUNG DISEASE): ICD-10-CM

## 2020-06-16 DIAGNOSIS — I87.2 VENOUS INSUFFICIENCY OF BOTH LOWER EXTREMITIES: ICD-10-CM

## 2020-06-16 DIAGNOSIS — Z99.2 PULMONARY HYPERTENSION ASSOCIATED WITH END-STAGE RENAL DISEASE (ESRD) ON DIALYSIS: ICD-10-CM

## 2020-06-16 PROCEDURE — 99214 PR OFFICE/OUTPT VISIT, EST, LEVL IV, 30-39 MIN: ICD-10-PCS | Mod: S$PBB,,, | Performed by: INTERNAL MEDICINE

## 2020-06-16 PROCEDURE — 99999 PR PBB SHADOW E&M-EST. PATIENT-LVL V: ICD-10-PCS | Mod: PBBFAC,,, | Performed by: INTERNAL MEDICINE

## 2020-06-16 PROCEDURE — 99214 OFFICE O/P EST MOD 30 MIN: CPT | Mod: S$PBB,,, | Performed by: INTERNAL MEDICINE

## 2020-06-16 PROCEDURE — 99215 OFFICE O/P EST HI 40 MIN: CPT | Mod: PBBFAC | Performed by: INTERNAL MEDICINE

## 2020-06-16 PROCEDURE — 99999 PR PBB SHADOW E&M-EST. PATIENT-LVL V: CPT | Mod: PBBFAC,,, | Performed by: INTERNAL MEDICINE

## 2020-06-16 RX ORDER — CARVEDILOL 25 MG/1
25 TABLET ORAL 2 TIMES DAILY WITH MEALS
Qty: 60 TABLET | Refills: 3 | Status: SHIPPED | OUTPATIENT
Start: 2020-06-16

## 2020-06-16 RX ORDER — RIFAMPIN 300 MG/1
300 CAPSULE ORAL DAILY
COMMUNITY
End: 2022-03-15 | Stop reason: ALTCHOICE

## 2020-06-16 RX ORDER — AMLODIPINE BESYLATE 10 MG/1
10 TABLET ORAL DAILY
Qty: 90 TABLET | Refills: 1 | Status: SHIPPED | OUTPATIENT
Start: 2020-06-16

## 2020-06-16 RX ORDER — HYDRALAZINE HYDROCHLORIDE 100 MG/1
100 TABLET, FILM COATED ORAL 3 TIMES DAILY
Qty: 90 TABLET | Refills: 6 | Status: SHIPPED | OUTPATIENT
Start: 2020-06-16 | End: 2022-03-15

## 2020-06-16 RX ORDER — CLONIDINE HYDROCHLORIDE 0.1 MG/1
0.1 TABLET ORAL EVERY 8 HOURS PRN
COMMUNITY

## 2020-06-16 NOTE — PROGRESS NOTES
Subjective:   Patient ID:  Stephanie Wilder is a 79 y.o. female who presents for cardiac consult of Venous insufficiency of both lower extremities (follow up)      HPI  The patient came in today for cardiac consult of Venous insufficiency of both lower extremities (follow up)    Referring Provider: Michael Cervantes  Reason for initial consult: Leg swelling    Stephanie Wilder is a 79 y.o. female pt with current medical conditions HTN, PVD, venous insufficiency, ESRD on HD M, W,F, DM, neuropathy, anemia, dementia, gerd presents for follow up CV evaluation.     6/29/18  Pt has been having presyncopal feelings during HD. She has been having LE edema x 2 months, gradually getting worse. No SOB, but cannot lay flat, somewhat describes orthopnea. No chest pain. No palpitations. Has been on HD for 13 years. Can walk to bathroom and back. Uses wheelchair, can't walk well overall due to weakness in legs. PT has syncopal episode 12 years ago since episode she couldn't walk, no stroke or other etiology found for lack of walking.     10/23/18  Pt had 2D ECHO with normal Bi V function, Pulm HTN, mild TR. She has LE cellulitis on PO abx, has not cleared for about 2-3 months now, no fevers/chills. Compression stockings have been helpful.   ECG - NSR, RBBB, 1st deg AVB, septal infarct - old    4/9/19  LE SMITHA/venous/arterial Dopplers not completed. BP elevated today but usually very low on HD days. Overall feels well. No syncope/palpitations. Right leg with swelling more with compression stockings on. Discussed needs ultrasounds of legs soon.     7/11/19  Mild PAD noted in recent SMITHA. U/S pending. Started on asa and statin. Has not gotten LE u/s yet. Will increase Hydralazine to 100 tid due to elevated Bp but needs to monitor closely at home.     6/16/20  LE us after last vsit with diffuse disease b/l, mild to moderate PAD. Pt had eval with Kourtney - Discussed SMITHA/arterial U/S with Dr. Reina. Needs to f/u with Dr. Burnett, [facility] surgeon.  She also  had bleeding from HD fistula went to ER last week, BP was elevated 211/91. She will see Dr. Boyd soon.   She lives in Nursing home, does not have COVID but brothers do and one of them . BP has improved. She had a fall recently while getting up to fridge, another time out of bed.     Patient feels no chest pain, no sob,  no PND, no palpitation, no syncope, no CNS symptoms.    Patient is compliant with medications.    19 [facility]  Patient has Rt prox/mid thigh dialysis graft.     COMPLICATIONS   RLE diffuse monophasic waveform suggesting inflow disease;   LLE: occluded distal PTA with diffuse monophasic waveform.   50 to 99% lesios in bilateral CFA, SFA, popliteal and tibioperoneal trunk arteries.   RLE Diaslysis graft petent.     RLE SMITHA 0.8, mild lower extremity arterial disease.   LLE SMITHA 0.97, minimal disease         This document has been electronically    SIGNED BY: Isaiah Rolon MD On: 2019 19:06     2D ECHO  CONCLUSIONS     1 - Concentric hypertrophy.     2 - No wall motion abnormalities.     3 - Normal left ventricular systolic function (EF 60-65%).     4 - Indeterminate LV diastolic function.     5 - Normal right ventricular systolic function .     6 - Pulmonary hypertension. The estimated PA systolic pressure is 45 mmHg.     7 - Mild tricuspid regurgitation.     Past Medical History:   Diagnosis Date    Anemia     Constipation     Dementia     Dementia     Diabetes mellitus     Diabetic retinopathy     Dysphagia     End stage renal disease on dialysis     , , Sat    GERD (gastroesophageal reflux disease)     Hypertension     Neuropathy     Pneumonia     Traction detachment of left retina 2015       Past Surgical History:   Procedure Laterality Date    CATARACT EXTRACTION      MASTECTOMY Right        Social History     Tobacco Use    Smoking status: Never Smoker    Smokeless tobacco: Never Used   Substance Use Topics    Alcohol use: No    Drug use: No       Family  History   Problem Relation Age of Onset    Diabetes Maternal Aunt     Glaucoma Maternal Aunt     Heart attack Maternal Aunt 80    Diabetes Paternal Aunt     Glaucoma Paternal Aunt     Diabetes Child     Heart attack Brother 79       Patient's Medications   New Prescriptions    No medications on file   Previous Medications    ACETAMINOPHEN (TYLENOL) 325 MG TABLET    Take 325 mg by mouth every 6 (six) hours as needed for Pain.    ASPIRIN (ECOTRIN) 81 MG EC TABLET    Take 1 tablet (81 mg total) by mouth once daily.    CETIRIZINE (ZYRTEC) 10 MG TABLET    Take 10 mg by mouth once daily.    CLONIDINE (CATAPRES) 0.1 MG TABLET    Take 0.1 mg by mouth 2 (two) times daily.    CLOTRIMAZOLE-BETAMETHASONE (LOTRISONE) CREAM        CRANBERRY FRUIT EXTRACT (CRANBERRY ORAL)    Take by mouth.    DICYCLOMINE (BENTYL) 10 MG CAPSULE    Take 10 mg by mouth 4 (four) times daily before meals and nightly.    DOCUSATE SODIUM (COLACE) 100 MG CAPSULE    Take 100 mg by mouth 2 (two) times daily.    DOXYCYCLINE (VIBRAMYCIN) 100 MG CAP    Take 100 mg by mouth every 12 (twelve) hours.    ERGOCALCIFEROL (VITAMIN D2) 50,000 UNIT CAP    Take 1,000 Units by mouth every 7 days.    ERYTHROMYCIN (ROMYCIN) OPHTHALMIC OINTMENT    Place into the left eye every evening.    ESCITALOPRAM OXALATE (LEXAPRO) 10 MG TABLET    Take 1 tablet (10 mg total) by mouth once daily. 1 Tablet Oral Every day    ETHYL CHLORIDE 100% 100 % SPRAY    Apply topically as needed.    FERROUS SULFATE 325 MG (65 MG IRON) TAB TABLET    Take 325 mg by mouth daily with breakfast.    GABAPENTIN (NEURONTIN) 100 MG CAPSULE    Take 100 mg by mouth 3 (three) times daily.    GUAIFENESIN (MUCINEX) 600 MG 12 HR TABLET    Take 1,200 mg by mouth 2 (two) times daily.    HYDROCORTISONE (PREPARATION H HYDROCORTISONE) 1 % CREAM    Apply topically 2 (two) times daily.    HYDROXYZINE PAMOATE (VISTARIL) 25 MG CAP    Take 25 mg by mouth nightly.    INSULIN ASP PRT-INSULIN ASPART (NOVOLOG MIX 70-30)  100 UNIT/ML (70-30) SOLN    Sliding scale    INSULIN ASPART U-100 (NOVOLOG) 100 UNIT/ML INJECTION    Inject into the skin 3 (three) times daily before meals.    INSULIN DETEMIR (LEVEMIR) 100 UNIT/ML INJECTION    Inject 12 Units into the skin once daily.    LISINOPRIL (PRINIVIL,ZESTRIL) 40 MG TABLET    Take 40 mg by mouth once daily.    LOPERAMIDE (IMODIUM) 2 MG CAPSULE    Take 2 mg by mouth 4 (four) times daily as needed for Diarrhea.    MIDODRINE (PROAMATINE) 5 MG TAB    Take 2.5 mg by mouth 2 (two) times daily with meals.    MIRTAZAPINE (REMERON) 30 MG TABLET    Take 30 mg by mouth every evening.    NEOMYCIN-POLYMYXIN-DEXAMETHASONE (DEXACINE) 3.5 MG/G-10,000 UNIT/G-0.1 % OINT    every 6 (six) hours.    NEOMYCIN-POLYMYXIN-DEXAMETHASONE (MAXITROL) 3.5MG/ML-10,000 UNIT/ML-0.1 % DRPS    Place 1 drop into the right eye 4 (four) times daily. USE 1 DROP 4 TIMES A DAY FOR 1 WEEK INTO THE AFFECTED EYE    OLANZAPINE (ZYPREXA) 5 MG TABLET    Take 5 mg by mouth every evening.    ONDANSETRON (ZOFRAN) 4 MG TABLET    Take 4 mg by mouth every 8 (eight) hours as needed for Nausea.    PANTOPRAZOLE (PROTONIX) 40 MG TABLET    Take 1 tablet (40 mg total) by mouth 2 (two) times daily.    -PROPYLENE GLYCOL, PF, (SYSTANE ULTRA, PF,) 0.4-0.3 % DPET    Apply to eye.    POLYETHYLENE GLYCOL (GLYCOLAX) 17 GRAM PWPK    Take by mouth.    PRAVASTATIN (PRAVACHOL) 40 MG TABLET    Take 1 tablet (40 mg total) by mouth once daily.    RIFAMPIN (RIFADIN) 300 MG CAPSULE    Take 300 mg by mouth once daily.    SEVELAMER CARBONATE (RENVELA) 800 MG TAB    Take 800 mg by mouth 3 (three) times daily with meals.    SODIUM CHLORIDE 0.9% SOLP 500 ML WITH VANCOMYCIN 1,000 MG SOLR 1,000 MG    Irrigate with 1,000 mg as directed once.    TRAMADOL (ULTRAM) 50 MG TABLET    Take 50 mg by mouth every 6 (six) hours as needed for Pain.    VITAMIN RENAL FORMULA, B-COMPLEX-VITAMIN C-FOLIC ACID, (NEPHROCAP) 1 MG CAP    Take 1 capsule by mouth once daily.   Modified  "Medications    Modified Medication Previous Medication    AMLODIPINE (NORVASC) 10 MG TABLET amLODIPine (NORVASC) 10 MG tablet       Take 1 tablet (10 mg total) by mouth once daily.    Take 10 mg by mouth once daily.    CARVEDILOL (COREG) 25 MG TABLET carvedilol (COREG) 25 MG tablet       Take 1 tablet (25 mg total) by mouth 2 (two) times daily with meals.    Take 25 mg by mouth 2 (two) times daily with meals.    HYDRALAZINE (APRESOLINE) 100 MG TABLET hydrALAZINE (APRESOLINE) 100 MG tablet       Take 1 tablet (100 mg total) by mouth 3 (three) times daily.    Take 1 tablet (100 mg total) by mouth 3 (three) times daily.   Discontinued Medications    No medications on file       Review of Systems   Constitutional: Negative.    HENT: Negative.    Eyes: Negative.    Respiratory: Negative.    Cardiovascular: Positive for leg swelling.   Gastrointestinal: Negative.    Genitourinary: Negative.    Musculoskeletal: Negative.    Skin: Negative.    Neurological: Positive for focal weakness.   Endo/Heme/Allergies: Negative.    Psychiatric/Behavioral: Negative.    All 12 systems otherwise negative.      Wt Readings from Last 3 Encounters:   06/16/20 75.8 kg (167 lb)   06/10/20 70.4 kg (155 lb 1.6 oz)   10/08/19 75.9 kg (167 lb 5.3 oz)     Temp Readings from Last 3 Encounters:   06/10/20 98.4 °F (36.9 °C) (Oral)   03/30/20 99.2 °F (37.3 °C) (Oral)   06/22/16 98.6 °F (37 °C) (Oral)     BP Readings from Last 3 Encounters:   06/16/20 (!) 140/70   06/10/20 (!) 211/91   03/30/20 (!) 150/64     Pulse Readings from Last 3 Encounters:   06/16/20 70   06/10/20 85   03/30/20 83       BP (!) 140/70 (BP Location: Left arm, Patient Position: Sitting, BP Method: Medium (Manual))   Pulse 70   Ht 5' 6" (1.676 m)   Wt 75.8 kg (167 lb)   SpO2 (!) 94%   BMI 26.95 kg/m²     Objective:   Physical Exam   Constitutional: She is oriented to person, place, and time. She appears well-developed and well-nourished. No distress.   HENT:   Head: " Normocephalic and atraumatic.   Nose: Nose normal.   Mouth/Throat: Oropharynx is clear and moist.   Eyes: Conjunctivae and EOM are normal. No scleral icterus.   Neck: Normal range of motion. Neck supple. No JVD present. No thyromegaly present.   Cardiovascular: Normal rate, regular rhythm, S1 normal and S2 normal. Exam reveals no gallop, no S3, no S4 and no friction rub.   Murmur heard.   Midsystolic murmur is present with a grade of 2/6 at the upper right sternal border.  Pulmonary/Chest: Effort normal and breath sounds normal. No stridor. No respiratory distress. She has no wheezes. She has no rales. She exhibits no tenderness.   Abdominal: Soft. Bowel sounds are normal. She exhibits no distension and no mass. There is no abdominal tenderness. There is no rebound.   Genitourinary:    Genitourinary Comments: Deferred     Musculoskeletal: Normal range of motion.         General: Edema (1+) present. No tenderness or deformity.   Lymphadenopathy:     She has no cervical adenopathy.   Neurological: She is alert and oriented to person, place, and time. She exhibits normal muscle tone. Coordination normal.   Skin: Skin is warm and dry. Rash (lipodermatosclerosis on B/L LE) noted. She is not diaphoretic. No erythema. No pallor.   Psychiatric: She has a normal mood and affect. Her behavior is normal. Judgment and thought content normal.   Nursing note and vitals reviewed.      Lab Results   Component Value Date     03/30/2020    K 3.6 03/30/2020    CL 99 03/30/2020    CO2 26 03/30/2020    BUN 10 03/30/2020    CREATININE 3.3 (H) 03/30/2020     (H) 03/30/2020    HGBA1C 5.4 05/18/2020    HGBA1C 6.2 02/27/2016    MG 2.0 02/27/2016    AST 19 03/30/2020    ALT 16 03/30/2020    ALBUMIN 3.5 03/30/2020    PROT 8.0 03/30/2020    BILITOT 0.4 03/30/2020    WBC 5.88 06/10/2020    HGB 13.6 06/10/2020    HCT 44.8 06/10/2020    HCT 38 02/26/2016     (H) 06/10/2020     (L) 06/10/2020    INR 1.0 06/10/2020    TSH  <0.010 (L) 04/13/2016     (H) 08/02/2018     Assessment:      1. PVD (peripheral vascular disease)    2. Renovascular hypertension    3. Lipodermatosclerosis of both lower extremities    4. Venous insufficiency of both lower extremities    5. Hemodialysis-associated hypotension    6. End stage renal disease    7. CRLD (chronic restrictive lung disease)    8. Pulmonary hypertension associated with end-stage renal disease (ESRD) on dialysis        Plan:   1. LE edema sec Venous insufficiency  - rec compression stockings daily  - low salt diet    2. HTN/hypotension with HD - improved today  -increased Hydralazine, cont rest of meds    3. ESRD  - cont HD  - compression stockings will increase venous return to allow more volume removal with HD    4. Pulm HTN  - cont HD and f/u with pulm    5. PVD  - cont asa, statin  - f/u with vasc surgeon    Thank you for allowing me to participate in this patient's care. Please do not hesitate to contact me with any questions or concerns. Consult note has been forwarded to the referral physician.

## 2020-06-18 ENCOUNTER — DOCUMENTATION ONLY (OUTPATIENT)
Dept: NEPHROLOGY | Facility: CLINIC | Age: 80
End: 2020-06-18

## 2020-06-19 NOTE — PROGRESS NOTES
History & Physical      Chief Complaint:  H&P    HPI:        Patient is an  female with ESRD on HD MWF at the [facility].           ROS:        Constitutional: Negative for fever, chills, weight loss, malaise/fatigue and diaphoresis.   HENT: Negative for hearing loss, ear pain, nosebleeds, congestion, sore throat, neck pain, tinnitus and ear discharge.    Eyes: Negative for blurred vision, double vision, photophobia, pain, discharge and redness.   Respiratory: Negative for cough, hemoptysis, sputum production, shortness of breath, wheezing and stridor.    Cardiovascular: Negative for chest pain, palpitations, orthopnea, claudication, leg swelling and PND.   Gastrointestinal: Negative for heartburn, nausea, vomiting, abdominal pain, diarrhea, constipation, blood in stool and melena.   Genitourinary: Negative for dysuria, urgency, frequency, hematuria and flank pain.   Musculoskeletal: Negative for myalgias, back pain, joint pain and falls.   Skin: Negative for itching and rash.   Neurological: Negative for dizziness, tingling, tremors, sensory change, speech change, focal weakness, seizures, loss of consciousness, weakness and headaches.   Endo/Heme/Allergies: Negative for environmental allergies and polydipsia. Does not bruise/bleed easily.   Psychiatric/Behavioral: Negative for depression, suicidal ideas, hallucinations, memory loss and substance abuse. The patient is not nervous/anxious and does not have insomnia.    All 14 systems reviewed and negative except as noted above.  Balance of review of systems is negative.            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       Past Surgical History:   Procedure Laterality Date    CATARACT EXTRACTION      MASTECTOMY Right        Family History   Problem Relation Age of Onset    Diabetes Maternal Aunt     Glaucoma Maternal Aunt     Heart attack Maternal Aunt 80    Diabetes Paternal Aunt     Glaucoma Paternal Aunt     Diabetes Child     Heart attack Brother 79       Social History     Socioeconomic History    Marital status:      Spouse name: Not on file    Number of children: Not on file    Years of education: Not on file    Highest education level: Not on file   Occupational History    Not on file   Social Needs    Financial resource strain: Not on file    Food insecurity     Worry: Not on file     Inability: Not on file    Transportation needs     Medical: Not on file     Non-medical: Not on file   Tobacco Use    Smoking status: Never Smoker    Smokeless tobacco: Never Used   Substance and Sexual Activity    Alcohol use: No    Drug use: No    Sexual activity: Not on file   Lifestyle    Physical activity     Days per week: Not on file     Minutes per session: Not on file    Stress: Not on file   Relationships    Social connections     Talks on phone: Not on file     Gets together: Not on file     Attends Sikh service: Not on file     Active member of club or organization: Not on file     Attends meetings of clubs or organizations: Not on file     Relationship status: Not on file   Other Topics Concern    Not on file   Social History Narrative     but        Current Outpatient Medications   Medication Sig Dispense Refill    acetaminophen (TYLENOL) 325 MG tablet Take 325 mg by mouth every 6 (six) hours as needed for Pain.      amLODIPine (NORVASC) 10 MG tablet Take 1 tablet (10 mg total) by mouth once daily. 90 tablet 1    aspirin (ECOTRIN) 81 MG EC tablet Take 1 tablet (81 mg total) by mouth once daily. 30 tablet 0    carvediloL (COREG) 25 MG tablet Take 1  tablet (25 mg total) by mouth 2 (two) times daily with meals. 60 tablet 3    cetirizine (ZYRTEC) 10 MG tablet Take 10 mg by mouth once daily.      cloNIDine (CATAPRES) 0.1 MG tablet Take 0.1 mg by mouth 2 (two) times daily.      clotrimazole-betamethasone (LOTRISONE) cream       cranberry fruit extract (CRANBERRY ORAL) Take by mouth.      dicyclomine (BENTYL) 10 MG capsule Take 10 mg by mouth 4 (four) times daily before meals and nightly.      docusate sodium (COLACE) 100 MG capsule Take 100 mg by mouth 2 (two) times daily.      doxycycline (VIBRAMYCIN) 100 MG Cap Take 100 mg by mouth every 12 (twelve) hours.      ergocalciferol (VITAMIN D2) 50,000 unit Cap Take 1,000 Units by mouth every 7 days.      erythromycin (ROMYCIN) ophthalmic ointment Place into the left eye every evening. 3.5 g 0    escitalopram oxalate (LEXAPRO) 10 MG tablet Take 1 tablet (10 mg total) by mouth once daily. 1 Tablet Oral Every day 15 tablet 0    ethyl chloride 100% 100 % spray Apply topically as needed.      ferrous sulfate 325 mg (65 mg iron) Tab tablet Take 325 mg by mouth daily with breakfast.      gabapentin (NEURONTIN) 100 MG capsule Take 100 mg by mouth 3 (three) times daily.      guaifenesin (MUCINEX) 600 mg 12 hr tablet Take 1,200 mg by mouth 2 (two) times daily.      hydrALAZINE (APRESOLINE) 100 MG tablet Take 1 tablet (100 mg total) by mouth 3 (three) times daily. 90 tablet 6    hydrocortisone (PREPARATION H HYDROCORTISONE) 1 % cream Apply topically 2 (two) times daily.      hydrOXYzine pamoate (VISTARIL) 25 MG Cap Take 25 mg by mouth nightly.      insulin asp prt-insulin aspart (NOVOLOG MIX 70-30) 100 unit/mL (70-30) Soln Sliding scale      insulin aspart U-100 (NOVOLOG) 100 unit/mL injection Inject into the skin 3 (three) times daily before meals.      insulin detemir (LEVEMIR) 100 unit/mL injection Inject 12 Units into the skin once daily.      lisinopril (PRINIVIL,ZESTRIL) 40 MG tablet Take 40 mg by mouth  once daily.      loperamide (IMODIUM) 2 mg capsule Take 2 mg by mouth 4 (four) times daily as needed for Diarrhea.      midodrine (PROAMATINE) 5 MG Tab Take 2.5 mg by mouth 2 (two) times daily with meals.      mirtazapine (REMERON) 30 MG tablet Take 30 mg by mouth every evening.      neomycin-polymyxin-dexamethasone (DEXACINE) 3.5 mg/g-10,000 unit/g-0.1 % Oint every 6 (six) hours.      neomycin-polymyxin-dexamethasone (MAXITROL) 3.5mg/mL-10,000 unit/mL-0.1 % DrpS Place 1 drop into the right eye 4 (four) times daily. USE 1 DROP 4 TIMES A DAY FOR 1 WEEK INTO THE AFFECTED EYE 5 mL 2    olanzapine (ZYPREXA) 5 MG tablet Take 5 mg by mouth every evening.      ondansetron (ZOFRAN) 4 MG tablet Take 4 mg by mouth every 8 (eight) hours as needed for Nausea.      pantoprazole (PROTONIX) 40 MG tablet Take 1 tablet (40 mg total) by mouth 2 (two) times daily. 60 tablet 1    peg 400-propylene glycol, PF, (SYSTANE ULTRA, PF,) 0.4-0.3 % Dpet Apply to eye.      polyethylene glycol (GLYCOLAX) 17 gram PwPk Take by mouth.      pravastatin (PRAVACHOL) 40 MG tablet Take 1 tablet (40 mg total) by mouth once daily. 90 tablet 1    rifAMpin (RIFADIN) 300 MG capsule Take 300 mg by mouth once daily.      sevelamer carbonate (RENVELA) 800 mg Tab Take 800 mg by mouth 3 (three) times daily with meals.      sodium chloride 0.9% SolP 500 mL with vancomycin 1,000 mg SolR 1,000 mg Irrigate with 1,000 mg as directed once.      traMADol (ULTRAM) 50 mg tablet Take 50 mg by mouth every 6 (six) hours as needed for Pain.      vitamin renal formula, B-complex-vitamin c-folic acid, (NEPHROCAP) 1 mg Cap Take 1 capsule by mouth once daily.       No current facility-administered medications for this visit.        Review of patient's allergies indicates:  No Known Allergies      There were no vitals filed for this visit.          Physical Exam   Nursing Notes and Vital Signs Reviewed.     Constitutional: Well developed, well nourished. AAOx3, NAD,  speech/ comprehension clear   Head: Atraumatic. Normocephalic.   Eyes: PERRL. EOMI. Conjunctivae are not pale. No scleral icterus.   ENT: Mucous membranes are dry. No tongue tremors. Throat clear.  Neck: Supple. No JVD or LN or Carotid Bruits noted B.  Cardiovascular: S1S2 RRR, no murmurs, rubs, or gallops. Distal pulses are 2+ and symmetric.   Pulmonary/Chest: No evidence of respiratory distress. Clear to auscultation bilaterally. No wheezing, rales or rhonchi. No chest wall TTP.   Abdominal: Soft and non-distended. There is no tenderness. No rebound, guarding, or rigidity. No organomegaly. No mass or viscera palpable  Musculoskeletal: FROM in all extremities. No deformities, no TTP, no edema. No midline spinal TTP. No step-offs. Pelvis is stable to compression. No cyanosis. Moves all four extremities.   Skin: Skin is warm and dry.   Neurological: No gross neurological deficits, Strength 5/5 B, is equal in the upper and lower extremities bilaterally. No sensory deficits to light touch. No pronator drift.  DTRs are 2+ and equal throughout.   Psychiatric: Good eye contact. Normal Affect.      Laboratory Data:  Reviewed and noted in plan where applicable- Please see chart for full laboratory data.       Lab Results   Component Value Date    WBC 5.88 06/10/2020    HGB 13.6 06/10/2020    HCT 44.8 06/10/2020     (H) 06/10/2020     (L) 06/10/2020     BMP  Lab Results   Component Value Date     03/30/2020    K 3.6 03/30/2020    CL 99 03/30/2020    CO2 26 03/30/2020    BUN 10 03/30/2020    CREATININE 3.3 (H) 03/30/2020    CALCIUM 9.8 03/30/2020    ANIONGAP 11 03/30/2020    ESTGFRAFRICA 15 (A) 03/30/2020    EGFRNONAA 13 (A) 03/30/2020     CMP  Sodium   Date Value Ref Range Status   03/30/2020 136 136 - 145 mmol/L Final     Potassium   Date Value Ref Range Status   03/30/2020 3.6 3.5 - 5.1 mmol/L Final     Chloride   Date Value Ref Range Status   03/30/2020 99 95 - 110 mmol/L Final     CO2   Date Value  Ref Range Status   03/30/2020 26 23 - 29 mmol/L Final     Glucose   Date Value Ref Range Status   03/30/2020 125 (H) 70 - 110 mg/dL Final     BUN, Bld   Date Value Ref Range Status   03/30/2020 10 8 - 23 mg/dL Final     Creatinine   Date Value Ref Range Status   03/30/2020 3.3 (H) 0.5 - 1.4 mg/dL Final     Calcium   Date Value Ref Range Status   03/30/2020 9.8 8.7 - 10.5 mg/dL Final     Total Protein   Date Value Ref Range Status   03/30/2020 8.0 6.0 - 8.4 g/dL Final     Albumin   Date Value Ref Range Status   03/30/2020 3.5 3.5 - 5.2 g/dL Final     Total Bilirubin   Date Value Ref Range Status   03/30/2020 0.4 0.1 - 1.0 mg/dL Final     Comment:     For infants and newborns, interpretation of results should be based  on gestational age, weight and in agreement with clinical  observations.  Premature Infant recommended reference ranges:  Up to 24 hours.............<8.0 mg/dL  Up to 48 hours............<12.0 mg/dL  3-5 days..................<15.0 mg/dL  6-29 days.................<15.0 mg/dL       Alkaline Phosphatase   Date Value Ref Range Status   03/30/2020 46 (L) 55 - 135 U/L Final     AST   Date Value Ref Range Status   03/30/2020 19 10 - 40 U/L Final     ALT   Date Value Ref Range Status   03/30/2020 16 10 - 44 U/L Final     Anion Gap   Date Value Ref Range Status   03/30/2020 11 8 - 16 mmol/L Final     eGFR if    Date Value Ref Range Status   03/30/2020 15 (A) >60 mL/min/1.73 m^2 Final     eGFR if non    Date Value Ref Range Status   03/30/2020 13 (A) >60 mL/min/1.73 m^2 Final     Comment:     Calculation used to obtain the estimated glomerular filtration  rate (eGFR) is the CKD-EPI equation.        Lab Results   Component Value Date    CALCIUM 9.8 03/30/2020    PHOS 3.3 05/09/2019     Lab Results   Component Value Date    K 3.6 03/30/2020     Lab Results   Component Value Date    LABPROT 10.7 06/10/2020    ALBUMIN 3.5 03/30/2020     Lab Results   Component Value Date    HGBA1C  5.4 05/18/2020       [facility]  @[id number](GLU,NA,K,Cl,CO2,BUN,Creatinine,Calcium,MG)@      Radiology:  Reviewed and noted in plan where applicable- Please see chart for full radiology data.            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  ESRD:  Doing very well on dialysis. We will continue hemodialysis treatments three times a week, maintaining a URR of 70% or greater and a Kt/V of 1.20.  Currently, the patient is stable.    2.  Anemia:  We will check hemoglobin at least monthly, target range 10 to 11, transferrin saturation monthly, and ferritin quarterly.  We will dose Epogen and iron according to monthly blood work and according to protocol.    3.  Hypertension.  Currently controlled with current medications, sodium and fluid restrictions and dialysis prescription.    4.  Hyperparathyroidism secondary to renal origin.  We will check intact PTH on a quarterly basis.  We will dose vitamin D according to blood work and protocol.      Kate Red, ED

## 2020-06-28 NOTE — PROGRESS NOTES
Got a critical lab alert from [facility], 4 of 4 blood cultures positive for Staph aureus, discussed with her primary nephrologist, discussed with her primary MD at the nursing home, Dr. Vazquez will be admitting the patient at [facility] for further management, to my understanding she recently had endocarditis and had treatment for the same,    Fredy Rodriguez MD

## 2020-07-31 ENCOUNTER — TELEPHONE (OUTPATIENT)
Dept: NEPHROLOGY | Facility: CLINIC | Age: 80
End: 2020-07-31

## 2020-07-31 NOTE — TELEPHONE ENCOUNTER
Incoming call from [facility]. They just wanted to let you know that patient will have another CT with contrast on 8/11/20. They still have the previous clearance and just wanted to inform you.

## 2022-01-25 RX ORDER — GENTAMICIN SULFATE 0.3 %
OINTMENT (GRAM) OPHTHALMIC (EYE)
Qty: 20 G | Refills: 0 | Status: SHIPPED | OUTPATIENT
Start: 2022-01-25 | End: 2022-03-15 | Stop reason: ALTCHOICE

## 2022-03-15 ENCOUNTER — HOSPITAL ENCOUNTER (INPATIENT)
Facility: HOSPITAL | Age: 82
LOS: 2 days | Discharge: HOME | DRG: 280 | End: 2022-03-18
Attending: EMERGENCY MEDICINE | Admitting: FAMILY MEDICINE
Payer: MEDICARE

## 2022-03-15 DIAGNOSIS — Z86.59 HISTORY OF DEMENTIA: ICD-10-CM

## 2022-03-15 DIAGNOSIS — R07.9 CHEST PAIN: ICD-10-CM

## 2022-03-15 DIAGNOSIS — R53.1 WEAKNESS: ICD-10-CM

## 2022-03-15 DIAGNOSIS — D69.6 THROMBOCYTOPENIA: ICD-10-CM

## 2022-03-15 DIAGNOSIS — N30.00 ACUTE CYSTITIS WITHOUT HEMATURIA: ICD-10-CM

## 2022-03-15 DIAGNOSIS — N18.6 ESRD ON DIALYSIS: ICD-10-CM

## 2022-03-15 DIAGNOSIS — I21.4 NSTEMI (NON-ST ELEVATED MYOCARDIAL INFARCTION): Primary | ICD-10-CM

## 2022-03-15 DIAGNOSIS — Z99.2 ESRD ON DIALYSIS: ICD-10-CM

## 2022-03-15 DIAGNOSIS — I50.1 PULMONARY EDEMA WITH CONGESTIVE HEART FAILURE: ICD-10-CM

## 2022-03-15 DIAGNOSIS — G93.41 ENCEPHALOPATHY, METABOLIC: ICD-10-CM

## 2022-03-15 DIAGNOSIS — I50.9 CHF (CONGESTIVE HEART FAILURE): ICD-10-CM

## 2022-03-15 DIAGNOSIS — J96.01 ACUTE HYPOXEMIC RESPIRATORY FAILURE: ICD-10-CM

## 2022-03-15 PROBLEM — I50.33 DIASTOLIC CHF, ACUTE ON CHRONIC: Status: ACTIVE | Noted: 2022-03-15

## 2022-03-15 PROBLEM — R79.89 ELEVATED LFTS: Status: ACTIVE | Noted: 2022-03-15

## 2022-03-15 PROBLEM — G93.40 ACUTE ENCEPHALOPATHY: Status: ACTIVE | Noted: 2022-03-15

## 2022-03-15 LAB
ALBUMIN SERPL BCP-MCNC: 3.1 G/DL (ref 3.5–5.2)
ALLENS TEST: ABNORMAL
ALLENS TEST: ABNORMAL
ALP SERPL-CCNC: 165 U/L (ref 55–135)
ALT SERPL W/O P-5'-P-CCNC: 87 U/L (ref 10–44)
AMMONIA PLAS-SCNC: 35 UMOL/L (ref 10–50)
ANION GAP SERPL CALC-SCNC: 15 MMOL/L (ref 8–16)
APTT BLDCRRT: 29.3 SEC (ref 21–32)
AST SERPL-CCNC: 123 U/L (ref 10–40)
BASOPHILS # BLD AUTO: 0.01 K/UL (ref 0–0.2)
BASOPHILS NFR BLD: 0.1 % (ref 0–1.9)
BILIRUB SERPL-MCNC: 0.8 MG/DL (ref 0.1–1)
BILIRUB UR QL STRIP: ABNORMAL
BNP SERPL-MCNC: 4245 PG/ML (ref 0–99)
BUN SERPL-MCNC: 30 MG/DL (ref 8–23)
CALCIUM SERPL-MCNC: 8.5 MG/DL (ref 8.7–10.5)
CHLORIDE SERPL-SCNC: 102 MMOL/L (ref 95–110)
CLARITY UR: ABNORMAL
CO2 SERPL-SCNC: 22 MMOL/L (ref 23–29)
COLOR UR: ABNORMAL
CREAT SERPL-MCNC: 5.6 MG/DL (ref 0.5–1.4)
CTP QC/QA: YES
DELSYS: ABNORMAL
DIFFERENTIAL METHOD: ABNORMAL
EOSINOPHIL # BLD AUTO: 0 K/UL (ref 0–0.5)
EOSINOPHIL NFR BLD: 0 % (ref 0–8)
ERYTHROCYTE [DISTWIDTH] IN BLOOD BY AUTOMATED COUNT: 15.9 % (ref 11.5–14.5)
EST. GFR  (AFRICAN AMERICAN): 8 ML/MIN/1.73 M^2
EST. GFR  (NON AFRICAN AMERICAN): 7 ML/MIN/1.73 M^2
GLUCOSE SERPL-MCNC: 142 MG/DL (ref 70–110)
GLUCOSE SERPL-MCNC: 143 MG/DL (ref 70–110)
GLUCOSE SERPL-MCNC: 145 MG/DL (ref 70–110)
GLUCOSE UR QL STRIP: ABNORMAL
HCO3 UR-SCNC: 22.4 MMOL/L (ref 24–28)
HCO3 UR-SCNC: 22.6 MMOL/L (ref 24–28)
HCT VFR BLD AUTO: 36.5 % (ref 37–48.5)
HCT VFR BLD CALC: 35 %PCV (ref 36–54)
HCT VFR BLD CALC: 35 %PCV (ref 36–54)
HGB BLD-MCNC: 10.8 G/DL (ref 12–16)
HGB UR QL STRIP: ABNORMAL
IMM GRANULOCYTES # BLD AUTO: 0.09 K/UL (ref 0–0.04)
IMM GRANULOCYTES NFR BLD AUTO: 0.7 % (ref 0–0.5)
INR PPP: 1.4 (ref 0.8–1.2)
KETONES UR QL STRIP: ABNORMAL
LEUKOCYTE ESTERASE UR QL STRIP: ABNORMAL
LYMPHOCYTES # BLD AUTO: 1.1 K/UL (ref 1–4.8)
LYMPHOCYTES NFR BLD: 8.1 % (ref 18–48)
MAGNESIUM SERPL-MCNC: 2.1 MG/DL (ref 1.6–2.6)
MCH RBC QN AUTO: 32.9 PG (ref 27–31)
MCHC RBC AUTO-ENTMCNC: 29.6 G/DL (ref 32–36)
MCV RBC AUTO: 111 FL (ref 82–98)
MICROSCOPIC COMMENT: ABNORMAL
MONOCYTES # BLD AUTO: 1.6 K/UL (ref 0.3–1)
MONOCYTES NFR BLD: 11.9 % (ref 4–15)
NEUTROPHILS # BLD AUTO: 10.7 K/UL (ref 1.8–7.7)
NEUTROPHILS NFR BLD: 79.2 % (ref 38–73)
NITRITE UR QL STRIP: ABNORMAL
NRBC BLD-RTO: 0 /100 WBC
PCO2 BLDA: 45.3 MMHG (ref 35–45)
PCO2 BLDA: 49.8 MMHG (ref 35–45)
PH SMN: 7.26 [PH] (ref 7.35–7.45)
PH SMN: 7.3 [PH] (ref 7.35–7.45)
PH UR STRIP: ABNORMAL [PH] (ref 5–8)
PHOSPHATE SERPL-MCNC: 5.3 MG/DL (ref 2.7–4.5)
PLATELET # BLD AUTO: 83 K/UL (ref 150–450)
PMV BLD AUTO: 11.2 FL (ref 9.2–12.9)
PO2 BLDA: 45 MMHG (ref 40–60)
PO2 BLDA: 73 MMHG (ref 80–100)
POC BE: -4 MMOL/L
POC BE: -5 MMOL/L
POC IONIZED CALCIUM: 1.16 MMOL/L (ref 1.06–1.42)
POC IONIZED CALCIUM: 1.19 MMOL/L (ref 1.06–1.42)
POC SATURATED O2: 73 % (ref 95–100)
POC SATURATED O2: 93 % (ref 95–100)
POCT GLUCOSE: 107 MG/DL (ref 70–110)
POCT GLUCOSE: 121 MG/DL (ref 70–110)
POCT GLUCOSE: 126 MG/DL (ref 70–110)
POCT GLUCOSE: 137 MG/DL (ref 70–110)
POTASSIUM BLD-SCNC: 4.6 MMOL/L (ref 3.5–5.1)
POTASSIUM BLD-SCNC: 4.6 MMOL/L (ref 3.5–5.1)
POTASSIUM SERPL-SCNC: 4.7 MMOL/L (ref 3.5–5.1)
PROT SERPL-MCNC: 7.6 G/DL (ref 6–8.4)
PROT UR QL STRIP: ABNORMAL
PROTHROMBIN TIME: 15.6 SEC (ref 9–12.5)
RBC # BLD AUTO: 3.28 M/UL (ref 4–5.4)
SAMPLE: ABNORMAL
SAMPLE: ABNORMAL
SARS-COV-2 RDRP RESP QL NAA+PROBE: NEGATIVE
SITE: ABNORMAL
SITE: ABNORMAL
SODIUM BLD-SCNC: 139 MMOL/L (ref 136–145)
SODIUM BLD-SCNC: 139 MMOL/L (ref 136–145)
SODIUM SERPL-SCNC: 139 MMOL/L (ref 136–145)
SP GR UR STRIP: ABNORMAL (ref 1–1.03)
TROPONIN I SERPL DL<=0.01 NG/ML-MCNC: 5.19 NG/ML (ref 0–0.03)
TROPONIN I SERPL DL<=0.01 NG/ML-MCNC: 9.78 NG/ML (ref 0–0.03)
URN SPEC COLLECT METH UR: ABNORMAL
UROBILINOGEN UR STRIP-ACNC: ABNORMAL EU/DL
WBC # BLD AUTO: 13.54 K/UL (ref 3.9–12.7)
WBC #/AREA URNS HPF: >100 /HPF (ref 0–5)

## 2022-03-15 PROCEDURE — 82140 ASSAY OF AMMONIA: CPT | Performed by: NURSE PRACTITIONER

## 2022-03-15 PROCEDURE — 99215 OFFICE O/P EST HI 40 MIN: CPT | Mod: ,,, | Performed by: INTERNAL MEDICINE

## 2022-03-15 PROCEDURE — 93010 ELECTROCARDIOGRAM REPORT: CPT | Mod: ,,, | Performed by: INTERNAL MEDICINE

## 2022-03-15 PROCEDURE — 82330 ASSAY OF CALCIUM: CPT

## 2022-03-15 PROCEDURE — 36415 COLL VENOUS BLD VENIPUNCTURE: CPT | Performed by: FAMILY MEDICINE

## 2022-03-15 PROCEDURE — 85014 HEMATOCRIT: CPT

## 2022-03-15 PROCEDURE — 82803 BLOOD GASES ANY COMBINATION: CPT

## 2022-03-15 PROCEDURE — 85610 PROTHROMBIN TIME: CPT | Performed by: EMERGENCY MEDICINE

## 2022-03-15 PROCEDURE — 85730 THROMBOPLASTIN TIME PARTIAL: CPT | Performed by: EMERGENCY MEDICINE

## 2022-03-15 PROCEDURE — 80074 ACUTE HEPATITIS PANEL: CPT | Performed by: NURSE PRACTITIONER

## 2022-03-15 PROCEDURE — 85025 COMPLETE CBC W/AUTO DIFF WBC: CPT | Performed by: EMERGENCY MEDICINE

## 2022-03-15 PROCEDURE — 87340 HEPATITIS B SURFACE AG IA: CPT | Performed by: FAMILY MEDICINE

## 2022-03-15 PROCEDURE — 86706 HEP B SURFACE ANTIBODY: CPT | Performed by: FAMILY MEDICINE

## 2022-03-15 PROCEDURE — 87040 BLOOD CULTURE FOR BACTERIA: CPT | Mod: 59 | Performed by: NURSE PRACTITIONER

## 2022-03-15 PROCEDURE — 25000242 PHARM REV CODE 250 ALT 637 W/ HCPCS: Performed by: NURSE PRACTITIONER

## 2022-03-15 PROCEDURE — 93005 ELECTROCARDIOGRAM TRACING: CPT

## 2022-03-15 PROCEDURE — 99291 CRITICAL CARE FIRST HOUR: CPT | Mod: 25

## 2022-03-15 PROCEDURE — 86704 HEP B CORE ANTIBODY TOTAL: CPT | Performed by: FAMILY MEDICINE

## 2022-03-15 PROCEDURE — 27000221 HC OXYGEN, UP TO 24 HOURS

## 2022-03-15 PROCEDURE — 99215 PR OFFICE/OUTPT VISIT, EST, LEVL V, 40-54 MIN: ICD-10-PCS | Mod: ,,, | Performed by: INTERNAL MEDICINE

## 2022-03-15 PROCEDURE — G0378 HOSPITAL OBSERVATION PER HR: HCPCS

## 2022-03-15 PROCEDURE — 86706 HEP B SURFACE ANTIBODY: CPT | Mod: 91 | Performed by: FAMILY MEDICINE

## 2022-03-15 PROCEDURE — 83735 ASSAY OF MAGNESIUM: CPT | Performed by: EMERGENCY MEDICINE

## 2022-03-15 PROCEDURE — 36600 WITHDRAWAL OF ARTERIAL BLOOD: CPT

## 2022-03-15 PROCEDURE — 84132 ASSAY OF SERUM POTASSIUM: CPT

## 2022-03-15 PROCEDURE — 84484 ASSAY OF TROPONIN QUANT: CPT | Performed by: EMERGENCY MEDICINE

## 2022-03-15 PROCEDURE — 87086 URINE CULTURE/COLONY COUNT: CPT | Performed by: EMERGENCY MEDICINE

## 2022-03-15 PROCEDURE — 84100 ASSAY OF PHOSPHORUS: CPT | Performed by: EMERGENCY MEDICINE

## 2022-03-15 PROCEDURE — 80053 COMPREHEN METABOLIC PANEL: CPT | Performed by: EMERGENCY MEDICINE

## 2022-03-15 PROCEDURE — 93010 EKG 12-LEAD: ICD-10-PCS | Mod: ,,, | Performed by: INTERNAL MEDICINE

## 2022-03-15 PROCEDURE — 63600175 PHARM REV CODE 636 W HCPCS: Performed by: EMERGENCY MEDICINE

## 2022-03-15 PROCEDURE — 81000 URINALYSIS NONAUTO W/SCOPE: CPT | Performed by: EMERGENCY MEDICINE

## 2022-03-15 PROCEDURE — 83880 ASSAY OF NATRIURETIC PEPTIDE: CPT | Performed by: EMERGENCY MEDICINE

## 2022-03-15 PROCEDURE — 99900035 HC TECH TIME PER 15 MIN (STAT)

## 2022-03-15 PROCEDURE — 84295 ASSAY OF SERUM SODIUM: CPT

## 2022-03-15 PROCEDURE — 94640 AIRWAY INHALATION TREATMENT: CPT

## 2022-03-15 PROCEDURE — U0002 COVID-19 LAB TEST NON-CDC: HCPCS | Performed by: EMERGENCY MEDICINE

## 2022-03-15 PROCEDURE — 80100014 HC HEMODIALYSIS 1:1

## 2022-03-15 PROCEDURE — 96365 THER/PROPH/DIAG IV INF INIT: CPT

## 2022-03-15 PROCEDURE — 84484 ASSAY OF TROPONIN QUANT: CPT | Mod: 91 | Performed by: NURSE PRACTITIONER

## 2022-03-15 RX ORDER — PANTOPRAZOLE SODIUM 40 MG/1
40 TABLET, DELAYED RELEASE ORAL 2 TIMES DAILY
Status: DISCONTINUED | OUTPATIENT
Start: 2022-03-15 | End: 2022-03-18 | Stop reason: HOSPADM

## 2022-03-15 RX ORDER — SODIUM,POTASSIUM PHOSPHATES 280-250MG
2 POWDER IN PACKET (EA) ORAL
Status: DISCONTINUED | OUTPATIENT
Start: 2022-03-15 | End: 2022-03-18 | Stop reason: HOSPADM

## 2022-03-15 RX ORDER — ONDANSETRON 2 MG/ML
4 INJECTION INTRAMUSCULAR; INTRAVENOUS EVERY 6 HOURS PRN
Status: DISCONTINUED | OUTPATIENT
Start: 2022-03-15 | End: 2022-03-18 | Stop reason: HOSPADM

## 2022-03-15 RX ORDER — NALOXONE HCL 0.4 MG/ML
0.02 VIAL (ML) INJECTION
Status: DISCONTINUED | OUTPATIENT
Start: 2022-03-15 | End: 2022-03-18 | Stop reason: HOSPADM

## 2022-03-15 RX ORDER — HYDRALAZINE HYDROCHLORIDE 20 MG/ML
10 INJECTION INTRAMUSCULAR; INTRAVENOUS EVERY 6 HOURS PRN
Status: DISCONTINUED | OUTPATIENT
Start: 2022-03-15 | End: 2022-03-18 | Stop reason: HOSPADM

## 2022-03-15 RX ORDER — TALC
6 POWDER (GRAM) TOPICAL NIGHTLY PRN
Status: DISCONTINUED | OUTPATIENT
Start: 2022-03-15 | End: 2022-03-18 | Stop reason: HOSPADM

## 2022-03-15 RX ORDER — GABAPENTIN 100 MG/1
100 CAPSULE ORAL NIGHTLY
Status: DISCONTINUED | OUTPATIENT
Start: 2022-03-15 | End: 2022-03-18 | Stop reason: HOSPADM

## 2022-03-15 RX ORDER — HYDROCORTISONE 25 MG/G
CREAM TOPICAL
COMMUNITY

## 2022-03-15 RX ORDER — PROCHLORPERAZINE EDISYLATE 5 MG/ML
5 INJECTION INTRAMUSCULAR; INTRAVENOUS EVERY 6 HOURS PRN
Status: DISCONTINUED | OUTPATIENT
Start: 2022-03-15 | End: 2022-03-18 | Stop reason: HOSPADM

## 2022-03-15 RX ORDER — POLYVINYL ALCOHOL, POVIDONE .6; .5 G/100ML; G/100ML
1 SOLUTION/ DROPS INTRAOCULAR EVERY 12 HOURS PRN
COMMUNITY

## 2022-03-15 RX ORDER — DOCUSATE SODIUM 100 MG/1
100 CAPSULE, LIQUID FILLED ORAL DAILY
Status: DISCONTINUED | OUTPATIENT
Start: 2022-03-16 | End: 2022-03-18 | Stop reason: HOSPADM

## 2022-03-15 RX ORDER — POLYETHYLENE GLYCOL 3350 17 G/17G
17 POWDER, FOR SOLUTION ORAL DAILY
Status: DISCONTINUED | OUTPATIENT
Start: 2022-03-16 | End: 2022-03-18 | Stop reason: HOSPADM

## 2022-03-15 RX ORDER — CALCIUM CARBONATE 500(1250)
2 TABLET,CHEWABLE ORAL DAILY
COMMUNITY

## 2022-03-15 RX ORDER — IBUPROFEN 200 MG
16 TABLET ORAL
Status: DISCONTINUED | OUTPATIENT
Start: 2022-03-15 | End: 2022-03-18 | Stop reason: HOSPADM

## 2022-03-15 RX ORDER — NAPROXEN SODIUM 220 MG/1
81 TABLET, FILM COATED ORAL DAILY
Status: DISCONTINUED | OUTPATIENT
Start: 2022-03-15 | End: 2022-03-18 | Stop reason: HOSPADM

## 2022-03-15 RX ORDER — MELATONIN 5 MG
1 CAPSULE ORAL NIGHTLY
COMMUNITY

## 2022-03-15 RX ORDER — VIT C/E/ZN/COPPR/LUTEIN/ZEAXAN 250MG-90MG
1 CAPSULE ORAL DAILY
COMMUNITY

## 2022-03-15 RX ORDER — MAG HYDROX/ALUMINUM HYD/SIMETH 200-200-20
30 SUSPENSION, ORAL (FINAL DOSE FORM) ORAL 4 TIMES DAILY PRN
Status: DISCONTINUED | OUTPATIENT
Start: 2022-03-15 | End: 2022-03-18 | Stop reason: HOSPADM

## 2022-03-15 RX ORDER — GLUCAGON 1 MG
1 KIT INJECTION
Status: DISCONTINUED | OUTPATIENT
Start: 2022-03-15 | End: 2022-03-18 | Stop reason: HOSPADM

## 2022-03-15 RX ORDER — IPRATROPIUM BROMIDE AND ALBUTEROL SULFATE 2.5; .5 MG/3ML; MG/3ML
3 SOLUTION RESPIRATORY (INHALATION) EVERY 6 HOURS PRN
Status: DISCONTINUED | OUTPATIENT
Start: 2022-03-15 | End: 2022-03-18 | Stop reason: HOSPADM

## 2022-03-15 RX ORDER — BUDESONIDE 0.5 MG/2ML
0.5 INHALANT ORAL EVERY 12 HOURS
Status: DISCONTINUED | OUTPATIENT
Start: 2022-03-15 | End: 2022-03-18 | Stop reason: HOSPADM

## 2022-03-15 RX ORDER — SIMETHICONE 80 MG
1 TABLET,CHEWABLE ORAL 4 TIMES DAILY PRN
Status: DISCONTINUED | OUTPATIENT
Start: 2022-03-15 | End: 2022-03-18 | Stop reason: HOSPADM

## 2022-03-15 RX ORDER — SODIUM CHLORIDE 0.9 % (FLUSH) 0.9 %
10 SYRINGE (ML) INJECTION EVERY 8 HOURS PRN
Status: DISCONTINUED | OUTPATIENT
Start: 2022-03-15 | End: 2022-03-18 | Stop reason: HOSPADM

## 2022-03-15 RX ORDER — ACETAMINOPHEN 325 MG/1
650 TABLET ORAL EVERY 4 HOURS PRN
Status: DISCONTINUED | OUTPATIENT
Start: 2022-03-15 | End: 2022-03-18 | Stop reason: HOSPADM

## 2022-03-15 RX ORDER — LANOLIN ALCOHOL/MO/W.PET/CERES
800 CREAM (GRAM) TOPICAL
Status: DISCONTINUED | OUTPATIENT
Start: 2022-03-15 | End: 2022-03-18 | Stop reason: HOSPADM

## 2022-03-15 RX ORDER — BENZONATATE 100 MG/1
100 CAPSULE ORAL 3 TIMES DAILY PRN
Status: ON HOLD | COMMUNITY
End: 2022-03-18 | Stop reason: HOSPADM

## 2022-03-15 RX ORDER — IPRATROPIUM BROMIDE AND ALBUTEROL SULFATE 2.5; .5 MG/3ML; MG/3ML
3 SOLUTION RESPIRATORY (INHALATION) EVERY 6 HOURS
Status: DISCONTINUED | OUTPATIENT
Start: 2022-03-15 | End: 2022-03-18 | Stop reason: HOSPADM

## 2022-03-15 RX ORDER — HEPARIN SODIUM,PORCINE/D5W 25000/250
0-40 INTRAVENOUS SOLUTION INTRAVENOUS CONTINUOUS
Status: DISCONTINUED | OUTPATIENT
Start: 2022-03-15 | End: 2022-03-17

## 2022-03-15 RX ORDER — TRAMADOL HYDROCHLORIDE 50 MG/1
50 TABLET ORAL EVERY 12 HOURS PRN
Status: DISCONTINUED | OUTPATIENT
Start: 2022-03-15 | End: 2022-03-18 | Stop reason: HOSPADM

## 2022-03-15 RX ORDER — IBUPROFEN 200 MG
24 TABLET ORAL
Status: DISCONTINUED | OUTPATIENT
Start: 2022-03-15 | End: 2022-03-18 | Stop reason: HOSPADM

## 2022-03-15 RX ORDER — DEXTROSE MONOHYDRATE 50 MG/ML
INJECTION, SOLUTION INTRAVENOUS CONTINUOUS
Status: DISCONTINUED | OUTPATIENT
Start: 2022-03-15 | End: 2022-03-17

## 2022-03-15 RX ORDER — INSULIN ASPART 100 [IU]/ML
0-5 INJECTION, SOLUTION INTRAVENOUS; SUBCUTANEOUS
Status: DISCONTINUED | OUTPATIENT
Start: 2022-03-15 | End: 2022-03-18 | Stop reason: HOSPADM

## 2022-03-15 RX ORDER — CARVEDILOL 3.12 MG/1
3.12 TABLET ORAL 2 TIMES DAILY WITH MEALS
Status: DISCONTINUED | OUTPATIENT
Start: 2022-03-15 | End: 2022-03-18 | Stop reason: HOSPADM

## 2022-03-15 RX ORDER — PRAVASTATIN SODIUM 20 MG/1
40 TABLET ORAL DAILY
Status: DISCONTINUED | OUTPATIENT
Start: 2022-03-16 | End: 2022-03-15

## 2022-03-15 RX ADMIN — HEPARIN SODIUM 12 UNITS/KG/HR: 1000 INJECTION, SOLUTION INTRAVENOUS; SUBCUTANEOUS at 04:03

## 2022-03-15 RX ADMIN — BUDESONIDE 0.5 MG: 0.5 INHALANT ORAL at 08:03

## 2022-03-15 RX ADMIN — CEFTRIAXONE 1 G: 1 INJECTION, SOLUTION INTRAVENOUS at 02:03

## 2022-03-15 RX ADMIN — IPRATROPIUM BROMIDE AND ALBUTEROL SULFATE 3 ML: 2.5; .5 SOLUTION RESPIRATORY (INHALATION) at 08:03

## 2022-03-15 NOTE — ASSESSMENT & PLAN NOTE
Reviewed the chart  H/o of CAD, past MI, diasttolic CHF  Discussed with ER  Agree with current mgmt

## 2022-03-15 NOTE — ED PROVIDER NOTES
SCRIBE #1 NOTE: I, Alejandra Winters, am scribing for, and in the presence of, Hui Cardona MD. I have scribed the entire note.      History      Chief Complaint   Patient presents with    Hypoglycemia     CBG 40 at nursing home.        Review of patient's allergies indicates:  No Known Allergies     HPI   HPI    3/15/2022, 11:16 AM   History obtained from [facility]  HPI/ROS limited secondary to dementia      History of Present Illness: Stephanie Wilder is a 81 y.o. female patient with a PMHx of anemia, dementia, DM, dysphagia, end stage renal disease on dialysis, HTN, neuropathy, and pneumonia who presents to the Emergency Department for hypoglycemia. Per [facility], the pt was found unresponsive and hypoxemic at her nursing home with a CBG of 40. At her nursing home, [facility] reports the pt was put on a non-rebreather mask and given 2 mg glucagon IM. [facility] put the pt on nasal cannula O2 and administered 1 amp, hanging IVPB D50 and 25 D50W. [facility] reports the last CBG they recorded was 143.       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       Past Surgical History:  Past Surgical History:   Procedure Laterality Date    CATARACT EXTRACTION      MASTECTOMY Right          Family History:  Family History   Problem Relation Age of Onset    Diabetes Maternal Aunt     Glaucoma Maternal Aunt     Heart attack Maternal Aunt 80    Diabetes Paternal Aunt     Glaucoma Paternal Aunt     Diabetes Child     Heart attack Brother 79       Social History:  Social History     Tobacco Use    Smoking status: Never Smoker    Smokeless tobacco: Never Used    Substance and Sexual Activity    Alcohol use: No    Drug use: No    Sexual activity: Not on file       ROS   Review of Systems   Unable to perform ROS: Dementia     Physical Exam      Initial Vitals [03/15/22 1107]   BP Pulse Resp Temp SpO2   (!) 143/33 82 16 97.6 °F (36.4 °C) 100 %      MAP       --          Physical Exam  Nursing Notes and Vital Signs Reviewed.  Constitutional: Patient is in no acute distress. Chronically debilitated female.   Head: Atraumatic. Normocephalic.  Eyes: PERRL. EOM intact. Conjunctivae are not pale. No scleral icterus.  ENT: Mucous membranes are dry. Oropharynx is clear and symmetric.    Neck: Supple. Full ROM. No lymphadenopathy.  Cardiovascular: Regular rate. Regular rhythm. No murmurs, rubs, or gallops. Distal pulses are 2+ and symmetric. Shunt to R upper thigh with good thrill.  Pulmonary/Chest: No respiratory distress. Coarse bilaterally. No wheezing or rales.  Abdominal: Soft and non-distended.  There is no tenderness.  No rebound, guarding, or rigidity.   Musculoskeletal: Moves all extremities. No obvious deformities. Bilateral lower extremity lymphedema.  Skin: Warm and dry.  Neurological:  Drowsy, but arrousable.  Mumbled speech.  Pt does not follow commands. Oriented to person only. No acute focal neurological deficits are appreciated.  Psychiatric: Normal affect. Good eye contact. Appropriate in content.    ED Course    Critical Care    Date/Time: 3/15/2022 3:20 PM  Performed by: Hui Cardona MD  Authorized by: Hui Cardona MD   Direct patient critical care time: 25 minutes  Additional history critical care time: 10 minutes  Ordering / reviewing critical care time: 10 minutes  Documentation critical care time: 5 minutes  Consulting other physicians critical care time: 15 minutes  Total critical care time (exclusive of procedural time) : 65 minutes  Critical care time was exclusive of separately billable procedures and treating other patients and teaching  "time.  Critical care was necessary to treat or prevent imminent or life-threatening deterioration of the following conditions: cardiac failure and renal failure (NSTEMI).  Critical care was time spent personally by me on the following activities: blood draw for specimens, development of treatment plan with patient or surrogate, discussions with consultants, interpretation of cardiac output measurements, evaluation of patient's response to treatment, examination of patient, obtaining history from patient or surrogate, ordering and performing treatments and interventions, ordering and review of laboratory studies, ordering and review of radiographic studies, pulse oximetry, re-evaluation of patient's condition and review of old charts.        ED Vital Signs:  Vitals:    03/15/22 1107 03/15/22 1124 03/15/22 1129 03/15/22 1200   BP: (!) 143/33   (!) 130/59   Pulse: 82  81 82   Resp: 16      Temp: 97.6 °F (36.4 °C)      TempSrc: Axillary      SpO2: 100%   (!) 93%   Weight:  88.5 kg (195 lb 1.7 oz)     Height:        03/15/22 1230 03/15/22 1400 03/15/22 1500 03/15/22 1517   BP: (!) 121/58 (!) 99/49 133/61    Pulse: 82 79 84    Resp: 20 19 10    Temp:       TempSrc:       SpO2: (!) 93% (!) 90% 98%    Weight:       Height:    5' 8" (1.727 m)       Abnormal Lab Results:  Labs Reviewed   COMPREHENSIVE METABOLIC PANEL - Abnormal; Notable for the following components:       Result Value    CO2 22 (*)     Glucose 142 (*)     BUN 30 (*)     Creatinine 5.6 (*)     Calcium 8.5 (*)     Albumin 3.1 (*)     Alkaline Phosphatase 165 (*)      (*)     ALT 87 (*)     eGFR if  8 (*)     eGFR if non  7 (*)     All other components within normal limits   B-TYPE NATRIURETIC PEPTIDE - Abnormal; Notable for the following components:    BNP 4,245 (*)     All other components within normal limits   URINALYSIS, REFLEX TO URINE CULTURE - Abnormal; Notable for the following components:    Color, UA Other (*)  "    Appearance, UA Cloudy (*)     All other components within normal limits    Narrative:     Specimen Source->Urine   URINALYSIS MICROSCOPIC - Abnormal; Notable for the following components:    WBC, UA >100 (*)     All other components within normal limits    Narrative:     Specimen Source->Urine   CBC W/ AUTO DIFFERENTIAL - Abnormal; Notable for the following components:    WBC 13.54 (*)     RBC 3.28 (*)     Hemoglobin 10.8 (*)     Hematocrit 36.5 (*)      (*)     MCH 32.9 (*)     MCHC 29.6 (*)     RDW 15.9 (*)     Platelets 83 (*)     Immature Granulocytes 0.7 (*)     Gran # (ANC) 10.7 (*)     Immature Grans (Abs) 0.09 (*)     Mono # 1.6 (*)     Gran % 79.2 (*)     Lymph % 8.1 (*)     All other components within normal limits   PHOSPHORUS - Abnormal; Notable for the following components:    Phosphorus 5.3 (*)     All other components within normal limits   TROPONIN I - Abnormal; Notable for the following components:    Troponin I 5.194 (*)     All other components within normal limits   POCT GLUCOSE - Abnormal; Notable for the following components:    POCT Glucose 137 (*)     All other components within normal limits   POCT GLUCOSE - Abnormal; Notable for the following components:    POCT Glucose 126 (*)     All other components within normal limits   ISTAT PROCEDURE - Abnormal; Notable for the following components:    POC PH 7.261 (*)     POC PCO2 49.8 (*)     POC HCO3 22.4 (*)     POC SATURATED O2 73 (*)     POC Glucose 145 (*)     POC Hematocrit 35 (*)     All other components within normal limits   ISTAT PROCEDURE - Abnormal; Notable for the following components:    POC PH 7.305 (*)     POC PCO2 45.3 (*)     POC PO2 73 (*)     POC HCO3 22.6 (*)     POC SATURATED O2 93 (*)     POC Glucose 143 (*)     POC Hematocrit 35 (*)     All other components within normal limits   CULTURE, URINE   TROPONIN I   MAGNESIUM   PHOSPHORUS   MAGNESIUM   APTT   PROTIME-INR   SARS-COV-2 RDRP GENE    Narrative:     This test  utilizes isothermal nucleic acid amplification   technology to detect the SARS-CoV-2 RdRp nucleic acid segment.   The analytical sensitivity (limit of detection) is 125 genome   equivalents/mL.   A POSITIVE result implies infection with the SARS-CoV-2 virus;   the patient is presumed to be contagious.     A NEGATIVE result means that SARS-CoV-2 nucleic acids are not   present above the limit of detection. A NEGATIVE result should be   treated as presumptive. It does not rule out the possibility of   COVID-19 and should not be the sole basis for treatment decisions.   If COVID-19 is strongly suspected based on clinical and exposure   history, re-testing using an alternate molecular assay should be   considered.   This test is only for use under the Food and Drug   Administration s Emergency Use Authorization (EUA).   Commercial kits are provided by [employer].   Performance characteristics of the EUA have been independently   verified by [facility] Department of   Pathology and Laboratory Medicine.   _________________________________________________________________   The authorized Fact Sheet for Healthcare Providers and the authorized Fact   Sheet for Patients of the ID NOW COVID-19 are available on the FDA   website:     https://www.fda.gov/media/925978/download  https://www.fda.gov/media/445915/download           POCT GLUCOSE MONITORING CONTINUOUS   POCT GLUCOSE MONITORING CONTINUOUS        All Lab Results:  Results for orders placed or performed during the hospital encounter of 03/15/22   Comprehensive metabolic panel   Result Value Ref Range    Sodium 139 136 - 145 mmol/L    Potassium 4.7 3.5 - 5.1 mmol/L    Chloride 102 95 - 110 mmol/L    CO2 22 (L) 23 - 29 mmol/L    Glucose 142 (H) 70 - 110 mg/dL    BUN 30 (H) 8 - 23 mg/dL    Creatinine 5.6 (H) 0.5 - 1.4 mg/dL    Calcium 8.5 (L) 8.7 - 10.5 mg/dL    Total Protein 7.6 6.0 - 8.4 g/dL    Albumin 3.1 (L) 3.5 - 5.2 g/dL    Total Bilirubin 0.8 0.1  - 1.0 mg/dL    Alkaline Phosphatase 165 (H) 55 - 135 U/L     (H) 10 - 40 U/L    ALT 87 (H) 10 - 44 U/L    Anion Gap 15 8 - 16 mmol/L    eGFR if African American 8 (A) >60 mL/min/1.73 m^2    eGFR if non African American 7 (A) >60 mL/min/1.73 m^2   Brain natriuretic peptide   Result Value Ref Range    BNP 4,245 (H) 0 - 99 pg/mL   Urinalysis, Reflex to Urine Culture Urine, Catheterized    Specimen: Urine   Result Value Ref Range    Specimen UA Urine, Catheterized     Color, UA Other (A) Yellow, Straw, Oriana    Appearance, UA Cloudy (A) Clear    pH, UA SEE COMMENT 5.0 - 8.0    Specific Gravity, UA SEE COMMENT 1.005 - 1.030    Protein, UA SEE COMMENT Negative    Glucose, UA SEE COMMENT Negative    Ketones, UA SEE COMMENT Negative    Bilirubin (UA) SEE COMMENT Negative    Occult Blood UA SEE COMMENT Negative    Nitrite, UA SEE COMMENT Negative    Urobilinogen, UA SEE COMMENT <2.0 EU/dL    Leukocytes, UA SEE COMMENT Negative   Urinalysis Microscopic   Result Value Ref Range    WBC, UA >100 (H) 0 - 5 /hpf    Microscopic Comment SEE COMMENT    CBC auto differential   Result Value Ref Range    WBC 13.54 (H) 3.90 - 12.70 K/uL    RBC 3.28 (L) 4.00 - 5.40 M/uL    Hemoglobin 10.8 (L) 12.0 - 16.0 g/dL    Hematocrit 36.5 (L) 37.0 - 48.5 %     (H) 82 - 98 fL    MCH 32.9 (H) 27.0 - 31.0 pg    MCHC 29.6 (L) 32.0 - 36.0 g/dL    RDW 15.9 (H) 11.5 - 14.5 %    Platelets 83 (L) 150 - 450 K/uL    MPV 11.2 9.2 - 12.9 fL    Immature Granulocytes 0.7 (H) 0.0 - 0.5 %    Gran # (ANC) 10.7 (H) 1.8 - 7.7 K/uL    Immature Grans (Abs) 0.09 (H) 0.00 - 0.04 K/uL    Lymph # 1.1 1.0 - 4.8 K/uL    Mono # 1.6 (H) 0.3 - 1.0 K/uL    Eos # 0.0 0.0 - 0.5 K/uL    Baso # 0.01 0.00 - 0.20 K/uL    nRBC 0 0 /100 WBC    Gran % 79.2 (H) 38.0 - 73.0 %    Lymph % 8.1 (L) 18.0 - 48.0 %    Mono % 11.9 4.0 - 15.0 %    Eosinophil % 0.0 0.0 - 8.0 %    Basophil % 0.1 0.0 - 1.9 %    Differential Method Automated    Magnesium   Result Value Ref Range     Magnesium 2.1 1.6 - 2.6 mg/dL   Phosphorus   Result Value Ref Range    Phosphorus 5.3 (H) 2.7 - 4.5 mg/dL   Troponin I   Result Value Ref Range    Troponin I 5.194 (H) 0.000 - 0.026 ng/mL   POCT COVID-19 Rapid Screening   Result Value Ref Range    POC Rapid COVID Negative Negative     Acceptable Yes    POCT glucose   Result Value Ref Range    POCT Glucose 137 (H) 70 - 110 mg/dL   POCT glucose   Result Value Ref Range    POCT Glucose 126 (H) 70 - 110 mg/dL   ISTAT PROCEDURE   Result Value Ref Range    POC PH 7.261 (L) 7.35 - 7.45    POC PCO2 49.8 (H) 35 - 45 mmHg    POC PO2 45 40 - 60 mmHg    POC HCO3 22.4 (L) 24 - 28 mmol/L    POC BE -5 -2 to 2 mmol/L    POC SATURATED O2 73 (L) 95 - 100 %    POC Glucose 145 (H) 70 - 110 mg/dL    POC Sodium 139 136 - 145 mmol/L    POC Potassium 4.6 3.5 - 5.1 mmol/L    POC Ionized Calcium 1.16 1.06 - 1.42 mmol/L    POC Hematocrit 35 (L) 36 - 54 %PCV    Sample VENOUS     Site RB     Allens Test Pass    ISTAT PROCEDURE   Result Value Ref Range    POC PH 7.305 (L) 7.35 - 7.45    POC PCO2 45.3 (H) 35 - 45 mmHg    POC PO2 73 (L) 80 - 100 mmHg    POC HCO3 22.6 (L) 24 - 28 mmol/L    POC BE -4 -2 to 2 mmol/L    POC SATURATED O2 93 (L) 95 - 100 %    POC Glucose 143 (H) 70 - 110 mg/dL    POC Sodium 139 136 - 145 mmol/L    POC Potassium 4.6 3.5 - 5.1 mmol/L    POC Ionized Calcium 1.19 1.06 - 1.42 mmol/L    POC Hematocrit 35 (L) 36 - 54 %PCV    Sample ARTERIAL     Site RR     Allens Test Pass     DelSys Nasal Can          Imaging Results:  Imaging Results          CT Head Without Contrast (Final result)  Result time 03/15/22 15:11:28    Final result by Clive Doty MD (03/15/22 15:11:28)                 Impression:      Chronic microvascular ischemic changes. If there is additional clinical concern for acute infarct, MRI with diffusion weighted imaging recommended.    All CT scans at this facility use dose modulation, iterative reconstruction, and/or weight based dosing when  appropriate to reduce radiation dose to as low as reasonable achievable.      Electronically signed by: Clive Doty MD  Date:    03/15/2022  Time:    15:11             Narrative:    EXAMINATION:  CT HEAD WITHOUT CONTRAST    CLINICAL HISTORY:  Mental status change, unknown cause;    TECHNIQUE:  Low dose axial CT images obtained throughout the head without intravenous contrast. Sagittal and coronal reconstructions were performed.    COMPARISON:  04/04/2015    FINDINGS:  Intracranial compartment:    The brain parenchyma demonstrates areas of decreased attenuation with moderate periventricular white matter consistent with chronic microvascular ischemic changes..  No parenchymal mass, hemorrhage, edema or major vascular distribution infarct.  Vascular calcifications are noted.    Moderate prominence of the sulci and ventricles are consistent with age-related involutional changes.    No extra-axial blood or fluid collections.    Skull/extracranial contents (limited evaluation): Dense vascular calcifications.  No fracture.  Mild diffuse sinus mucosal thickening.  Patient appears to be status post Fess surgery.  No fluid levels.  Diffuse osteopenia.  Chronic changes within the right globe as well as left lens replacement.  Mastoid air cells and paranasal sinuses are essentially clear.                               X-Ray Chest AP Portable (Final result)  Result time 03/15/22 12:17:32    Final result by Clive Doty MD (03/15/22 12:17:32)                 Impression:      Scattered interstitial and ground-glass opacities throughout the lungs which could reflect pulmonary edema or interstitial pneumonia.      Electronically signed by: Clive Doty MD  Date:    03/15/2022  Time:    12:17             Narrative:    EXAMINATION:  XR CHEST AP PORTABLE    CLINICAL HISTORY:  End stage renal disease    TECHNIQUE:  Single frontal view of the chest was performed.    COMPARISON:  03/30/2020    FINDINGS:  Scattered interstitial and  ground-glass opacities throughout the lungs which could reflect pulmonary edema or interstitial pneumonia.  Trace pleural effusions suspected.  No pneumothorax.  Heart size is enlarged but stable.  Bones demonstrate moderate degenerative change.  In comparison to the prior study, there is no additional interval changes                               The EKG was ordered, reviewed, and independently interpreted by the ED provider.  Interpretation time: 11:19  Rate: 83 BPM  Rhythm: Sinus rhythm with 1st degree A-V block  Interpretation: Right bundle branch block. Possible Lateral infarct, age undetermined. No STEMI.           The Emergency Provider reviewed the vital signs and test results, which are outlined above.    ED Discussion     2:54 PM: Discussed pt's case with Dr. Jang (Nephrology) who is now aware of the pt's case.    3:18 PM: Discussed pt's case with Dr. Liang (Cardiology) who recommends starting heparin.    3:19 PM: Discussed pt's case with Dr. Jang (Nephrology) who recommends waiting for HD if there are not acute indications since HD can make the pt unstable.    3:39 PM: Discussed case with Bill Johnson NP (Hospital Medicine). Dr. Brunner agrees with current care and management of pt and accepts admission.   Admitting Service: [facility]  Admitting Physician: Dr. Brunner  Admit to: Obs Med Tele    3:40 PM: Re-evaluated pt. I have discussed test results, shared treatment plan, and the need for admission with patient and family at bedside. Pt and family express understanding at this time and agree with all information. All questions answered. Pt and family have no further questions or concerns at this time. Pt is ready for admit.           ED Medication(s):  Medications   cefTRIAXone (ROCEPHIN) 1 g/50 mL D5W IVPB (1 g Intravenous New Bag 3/15/22 1415)   heparin 25,000 units in dextrose 5% (100 units/ml) IV bolus from bag INITIAL BOLUS (max bolus 4000 units) (has no administration in time range)    heparin 25,000 units in dextrose 5% 250 mL (100 units/mL) infusion LOW INTENSITY nomogram - [facility] (has no administration in time range)   heparin 25,000 units in dextrose 5% (100 units/ml) IV bolus from bag - ADDITIONAL PRN BOLUS - 60 units/kg (max bolus 4000 units) (has no administration in time range)   heparin 25,000 units in dextrose 5% (100 units/ml) IV bolus from bag - ADDITIONAL PRN BOLUS - 30 units/kg (max bolus 4000 units) (has no administration in time range)           New Prescriptions    No medications on file         Medical Decision Making    Medical Decision Making:   Clinical Tests:   Lab Tests: Ordered and Reviewed  Radiological Study: Ordered and Reviewed  Medical Tests: Ordered and Reviewed           Scribe Attestation:   Scribe #1: I performed the above scribed service and the documentation accurately describes the services I performed. I attest to the accuracy of the note.    Attending:   Physician Attestation Statement for Scribe #1: I, Hui Cardona MD, personally performed the services described in this documentation, as scribed by Alejandra Winters, in my presence, and it is both accurate and complete.          Clinical Impression       ICD-10-CM ICD-9-CM   1. NSTEMI (non-ST elevated myocardial infarction)  I21.4 410.70   2. ESRD on dialysis  N18.6 585.6    Z99.2 V45.11   3. Weakness  R53.1 780.79   4. Pulmonary edema with congestive heart failure  I50.1 428.0   5. Acute cystitis without hematuria  N30.00 595.0   6. History of dementia  Z86.59 V11.8   7. Acute hypoxemic respiratory failure  J96.01 518.81       Disposition:   Disposition: Placed in Observation  Condition: Fair         Hui Cardona MD  03/15/22 1541       Hui Cardona MD  03/15/22 3353

## 2022-03-15 NOTE — HPI
The patient is a 80 yo female with DM, HTN, ESRD on HD, GERD, Bipolar d/o, Breast cancer, Tuberculosis, mild cognitive impairment, Right eye blindness, and hx MRSA bacteremia with mitral vegetation -source thought to be right thigh graft but pt refused removal who presented to ED after being found at [facility] unresponsive, hypoxemic, and hypoglycemic with CBG of 40.  At her nursing home, [facility] placed a non-rebreather mask and gave 2 mg glucagon IM. [facility] then weaned O2 to a nasal cannula and administered 1 amp D5W.  On arrival to ED, pt was on 2 liters NC, CT head showed nothing acute. CXR showed pulmonary edema. EKG showed NSR with 1 st degree AV block, RBB, nonspecific changes. Troponin 5.1. BNP 4295. WBC 13, Mildy elevated LFTs, INR 1.4, +UTI. Glucose 143.    Pt is drowsy, disoriented x 3, arouses to tactile stimulation, and only mumbles incomprehensible words.   Nephrology and cardiology consulted

## 2022-03-15 NOTE — ED NOTES
Verified lab received order for lab collect on cbc....spoke with Susan  Received call unable to find CBC.....

## 2022-03-15 NOTE — SUBJECTIVE & OBJECTIVE
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     Current Outpatient Medications on File Prior to Encounter   Medication Sig    acetaminophen (TYLENOL) 325 MG tablet Take 650 mg by mouth every 8 (eight) hours.    amLODIPine (NORVASC) 10 MG tablet Take 1 tablet (10 mg total) by mouth once daily.    benzonatate (TESSALON) 100 MG capsule Take 100 mg by mouth 3 (three) times daily as needed for Cough.    calcium carbonate (OS-APARNA) 500 mg calcium (1,250 mg) chewable tablet Take 2 tablets by mouth once daily.    carvediloL (COREG) 25 MG tablet Take 1 tablet (25 mg total) by mouth 2 (two) times daily with meals. (Patient taking differently: Take 25 mg by mouth 2 (two) times daily with meals. (on Monday, Wednesday, & Friday))    cetirizine (ZYRTEC) 10 MG tablet Take 10 mg by mouth once daily.    cholecalciferol, vitamin D3, (VITAMIN D3) 25 mcg (1,000 unit) capsule Take 1 capsule by mouth once daily.    cloNIDine (CATAPRES) 0.1 MG tablet Take 0.1 mg by mouth every 8 (eight) hours as needed (hypertension).    docusate sodium (COLACE) 100 MG capsule Take 100 mg by mouth once daily. (on Tuesday, Thursday, Saturday, & Sunday)    doxycycline (VIBRAMYCIN) 100 MG Cap Take 100 mg by mouth once daily.    ethyl chloride 100% 100 % spray Apply topically as needed (to access cannulation once daily on Monday, Wednesday, & Friday).    ferric citrate (AURYXIA ORAL) Take 1 tablet by mouth 2 (two) times  daily with meals.    gabapentin (NEURONTIN) 100 MG capsule Take 100 mg by mouth every evening.    hydrocortisone (ANUSOL-HC) 2.5 % rectal cream Apply to hemorrhoids every 12 hours    hydrOXYzine pamoate (VISTARIL) 25 MG Cap Take 25 mg by mouth every 6 (six) hours as needed (allergies).    insulin aspart U-100 (NOVOLOG) 100 unit/mL injection Inject into the skin 3 (three) times daily before meals. using sliding scale    melatonin 5 mg Cap Take 1 capsule by mouth nightly.    mirtazapine (REMERON SOL-TAB) 15 MG disintegrating tablet Take 15 mg by mouth every evening.    pantoprazole (PROTONIX) 40 MG tablet Take 1 tablet (40 mg total) by mouth 2 (two) times daily.    polyethylene glycol (GLYCOLAX) 17 gram PwPk Take 17 g by mouth once daily.    polyvinyl alcohol-povidone (ARTIFICIAL TEARS,PVALCH-POVID,) 0.5-0.6 % Drop Place 1 drop into both eyes every 12 (twelve) hours as needed (dry eyes).    pravastatin (PRAVACHOL) 40 MG tablet Take 1 tablet (40 mg total) by mouth once daily.    psyllium (METAMUCIL) powder Take 1 packet by mouth once daily.    traMADol (ULTRAM) 50 mg tablet Take 50 mg by mouth every 12 (twelve) hours as needed for Pain.    [DISCONTINUED] aspirin (ECOTRIN) 81 MG EC tablet Take 1 tablet (81 mg total) by mouth once daily.    [DISCONTINUED] clotrimazole-betamethasone 1-0.05% (LOTRISONE) cream     [DISCONTINUED] cranberry fruit extract (CRANBERRY ORAL) Take by mouth.    [DISCONTINUED] dicyclomine (BENTYL) 10 MG capsule Take 10 mg by mouth 4 (four) times daily before meals and nightly.    [DISCONTINUED] ergocalciferol (VITAMIN D2) 50,000 unit Cap Take 1,000 Units by mouth every 7 days.    [DISCONTINUED] erythromycin (ROMYCIN) ophthalmic ointment Place into the left eye every evening.    [DISCONTINUED] escitalopram oxalate (LEXAPRO) 10 MG tablet Take 1 tablet (10 mg total) by mouth once daily. 1 Tablet Oral Every day    [DISCONTINUED] ferrous sulfate 325 mg (65 mg iron) Tab tablet Take 325 mg by mouth daily  with breakfast.    [DISCONTINUED] gentamicin (GENTAK) 0.3 % (3 mg/gram) ophthalmic ointment Apply in right eye qid x 10 days    [DISCONTINUED] guaifenesin (MUCINEX) 600 mg 12 hr tablet Take 1,200 mg by mouth 2 (two) times daily.    [DISCONTINUED] hydrALAZINE (APRESOLINE) 100 MG tablet Take 1 tablet (100 mg total) by mouth 3 (three) times daily.    [DISCONTINUED] hydrocortisone (PREPARATION H HYDROCORTISONE) 1 % cream Apply topically 2 (two) times daily.    [DISCONTINUED] insulin asp prt-insulin aspart (NOVOLOG MIX 70-30) 100 unit/mL (70-30) Soln Sliding scale    [DISCONTINUED] insulin detemir (LEVEMIR) 100 unit/mL injection Inject 12 Units into the skin once daily.    [DISCONTINUED] lisinopril (PRINIVIL,ZESTRIL) 40 MG tablet Take 40 mg by mouth once daily.    [DISCONTINUED] loperamide (IMODIUM) 2 mg capsule Take 2 mg by mouth 4 (four) times daily as needed for Diarrhea.    [DISCONTINUED] midodrine (PROAMATINE) 5 MG Tab Take 2.5 mg by mouth 2 (two) times daily with meals.    [DISCONTINUED] neomycin-polymyxin-dexamethasone (DEXACINE) 3.5 mg/g-10,000 unit/g-0.1 % Oint every 6 (six) hours.    [DISCONTINUED] neomycin-polymyxin-dexamethasone (MAXITROL) 3.5mg/mL-10,000 unit/mL-0.1 % DrpS Place 1 drop into the right eye 4 (four) times daily. USE 1 DROP 4 TIMES A DAY FOR 1 WEEK INTO THE AFFECTED EYE    [DISCONTINUED] olanzapine (ZYPREXA) 5 MG tablet Take 5 mg by mouth every evening.    [DISCONTINUED] ondansetron (ZOFRAN) 4 MG tablet Take 4 mg by mouth every 8 (eight) hours as needed for Nausea.    [DISCONTINUED] peg 400-propylene glycol, PF, (SYSTANE ULTRA, PF,) 0.4-0.3 % Dpet Apply to eye.    [DISCONTINUED] rifAMpin (RIFADIN) 300 MG capsule Take 300 mg by mouth once daily.    [DISCONTINUED] sevelamer carbonate (RENVELA) 800 mg Tab Take 800 mg by mouth 3 (three) times daily with meals.    [DISCONTINUED] sodium chloride 0.9% SolP 500 mL with vancomycin 1,000 mg SolR 1,000 mg Irrigate with 1,000 mg as directed once.     [DISCONTINUED] vitamin renal formula, B-complex-vitamin c-folic acid, (NEPHROCAP) 1 mg Cap Take 1 capsule by mouth once daily.     Family History       Problem Relation (Age of Onset)    Diabetes Maternal Aunt, Paternal Aunt, Child    Glaucoma Maternal Aunt, Paternal Aunt    Heart attack Maternal Aunt (80), Brother (79)          Tobacco Use    Smoking status: Never Smoker    Smokeless tobacco: Never Used   Substance and Sexual Activity    Alcohol use: No    Drug use: No    Sexual activity: Not on file     Review of Systems   Unable to perform ROS: Mental status change   Objective:     Vital Signs (Most Recent):  Temp: 96.8 °F (36 °C) (03/15/22 1731)  Pulse: 79 (03/15/22 1731)  Resp: 17 (03/15/22 1731)  BP: (!) 155/67 (03/15/22 1731)  SpO2: 98 % (03/15/22 1731)   Vital Signs (24h Range):  Temp:  [96.8 °F (36 °C)-97.6 °F (36.4 °C)] 96.8 °F (36 °C)  Pulse:  [72-86] 79  Resp:  [9-24] 17  SpO2:  [87 %-100 %] 98 %  BP: ()/(33-67) 155/67     Weight: 88.5 kg (195 lb 1.7 oz)  Body mass index is 29.67 kg/m².    Physical Exam  Vitals and nursing note reviewed.   Constitutional:       General: She is not in acute distress.     Appearance: She is well-developed. She is ill-appearing. She is not diaphoretic.   HENT:      Head: Normocephalic and atraumatic.      Nose: Nose normal.   Eyes:      General: No scleral icterus.     Extraocular Movements: EOM normal.      Comments: Right eye with stigmata of blindness    Neck:      Trachea: No tracheal deviation.   Cardiovascular:      Rate and Rhythm: Normal rate and regular rhythm.      Heart sounds: Normal heart sounds. No murmur heard.    No friction rub. No gallop.   Pulmonary:      Effort: Pulmonary effort is normal. No respiratory distress.      Breath sounds: No stridor. Rales present. No wheezing.   Chest:      Chest wall: No tenderness.   Abdominal:      General: Bowel sounds are normal. There is no distension.      Palpations: Abdomen is soft. There is no mass.       Tenderness: There is no abdominal tenderness. There is no guarding or rebound.   Musculoskeletal:         General: No tenderness or deformity. Normal range of motion.      Cervical back: Normal range of motion and neck supple.   Skin:     General: Skin is warm and dry.      Coloration: Skin is not pale.      Findings: No erythema or rash.      Comments: Scaly, dry skin to bilateral lower legs and feet   Neurological:      Mental Status: She is lethargic and disoriented.      Motor: No abnormal muscle tone.      Comments: Lethargic and disoriented  Pt only mumbles incomprehensible sounds   Does not follow commands    Psychiatric:         Cognition and Memory: Cognition is impaired.         CRANIAL NERVES     CN III, IV, VI   Extraocular motions are normal.      Significant Labs: All pertinent labs within the past 24 hours have been reviewed.    Significant Imaging: I have reviewed all pertinent imaging results/findings within the past 24 hours.

## 2022-03-15 NOTE — PHARMACY MED REC
"Admission Medication History     The home medication history was taken by Jarvis Burnett.    You may go to "Admission" then "Reconcile Home Medications" tabs to review and/or act upon these items.      The home medication list has been updated by the Pharmacy department.    Please read ALL comments highlighted in yellow.    Please address this information as you see fit.     Feel free to contact us if you have any questions or require assistance.    Used pt's med list from nursing home to reconcile meds. I believe list to be accurate but last doses are unknown. Lot of items removed, please review below and reorder if necessary.    The medications listed below were removed from the home medication list. Please reorder if appropriate:  Patient reports no longer taking the following medication(s):   ASPIRIN 81 MG EC TABLET   CLOTRIMAZOLE-BETAMETHASONE TOPICAL CREAM   CRANBERRY FRUIT EXTRACT   DICYCLOMINE 10 MG CAPSULE   ERGOCALCIFEROL 50,000 UNIT CAPSULE (TAKES D2 DAILY)   ERYTHROMYCIN OPHTHALMIC OINTMENT   ESCITALOPRAM OXALATE 10 MG TABLET   FERROUS SULFATE 325 MG (65 MG IRON) TABLET (TAKING AURYXIA)   GENTAMICIN 0.3 % OPHTHALIC OINTMENT   GUAIFENESIN 600 MG 12 HR TABLET   HYDRALAZINE 100 MG TABLET   HYDROCORTISONE 1 % TOPICAL CREAM   INSULIN ASPART-INSULIN ASPART (NOVOLOG MIX 70/30) 100 UNIT/mL INJECTION   INSULIN DETEMIR (LEVEMIR) 100 UNIT/mL INJECTION   LISINOPRIL 40 MG TABLET   LOPERAMIDE 2 MG CAPSULE   MIDODRINE 5 MG TABLET   NEOMYCIN-POLYMIXIN-DEXAMETHASONE 3.5 MG/G-10,000 UNIT/G-0.1 % OPHTHALMIC OINTMENT AND SOLUTION   OLANZAPINE 5 MG TABLET   ONDANSETRON 4 MG TABLET   RIFAMPIN 300 MG CAPSULE   SEVELAMER CARBONATE (RENVELA) 800 MG TABLET   VITAMIN RENAL FORMULA (NEPHROCAP)    Medications listed below were obtained from: Analytic software- [employer], Medical records and Nursing home  (Not in a hospital admission)      Potential issues to be addressed PRIOR TO DISCHARGE: NONE      Jarvis" Kamron Burnett  [facility] [phone]        Current Outpatient Medications on File Prior to Encounter   Medication Sig Dispense Refill Last Dose    acetaminophen (TYLENOL) 325 MG tablet Take 650 mg by mouth every 8 (eight) hours.       amLODIPine (NORVASC) 10 MG tablet Take 1 tablet (10 mg total) by mouth once daily. 90 tablet 1     benzonatate (TESSALON) 100 MG capsule Take 100 mg by mouth 3 (three) times daily as needed for Cough.       calcium carbonate (OS-APARNA) 500 mg calcium (1,250 mg) chewable tablet Take 2 tablets by mouth once daily.       carvediloL (COREG) 25 MG tablet Take 1 tablet (25 mg total) by mouth 2 (two) times daily with meals. (Patient taking differently: Take 25 mg by mouth 2 (two) times daily with meals. (on Monday, Wednesday, & Friday)) 60 tablet 3     cetirizine (ZYRTEC) 10 MG tablet Take 10 mg by mouth once daily.       cholecalciferol, vitamin D3, (VITAMIN D3) 25 mcg (1,000 unit) capsule Take 1 capsule by mouth once daily.       cloNIDine (CATAPRES) 0.1 MG tablet Take 0.1 mg by mouth every 8 (eight) hours as needed (hypertension).       docusate sodium (COLACE) 100 MG capsule Take 100 mg by mouth once daily. (on Tuesday, Thursday, Saturday, & Sunday)       doxycycline (VIBRAMYCIN) 100 MG Cap Take 100 mg by mouth once daily.       ethyl chloride 100% 100 % spray Apply topically as needed (to access cannulation once daily on Monday, Wednesday, & Friday).       ferric citrate (AURYXIA ORAL) Take 1 tablet by mouth 2 (two) times daily with meals.       gabapentin (NEURONTIN) 100 MG capsule Take 100 mg by mouth every evening.       hydrocortisone (ANUSOL-HC) 2.5 % rectal cream Apply to hemorrhoids every 12 hours       hydrOXYzine pamoate (VISTARIL) 25 MG Cap Take 25 mg by mouth every 6 (six) hours as needed (allergies).       insulin aspart U-100 (NOVOLOG) 100 unit/mL injection Inject into the skin 3 (three) times daily before meals. using sliding scale       melatonin 5 mg Cap  Take 1 capsule by mouth nightly.       mirtazapine (REMERON SOL-TAB) 15 MG disintegrating tablet Take 15 mg by mouth every evening.       pantoprazole (PROTONIX) 40 MG tablet Take 1 tablet (40 mg total) by mouth 2 (two) times daily. 60 tablet 1     polyethylene glycol (GLYCOLAX) 17 gram PwPk Take 17 g by mouth once daily.       polyvinyl alcohol-povidone (ARTIFICIAL TEARS,PVALCH-POVID,) 0.5-0.6 % Drop Place 1 drop into both eyes every 12 (twelve) hours as needed (dry eyes).       pravastatin (PRAVACHOL) 40 MG tablet Take 1 tablet (40 mg total) by mouth once daily. 90 tablet 1     psyllium (METAMUCIL) powder Take 1 packet by mouth once daily.       traMADol (ULTRAM) 50 mg tablet Take 50 mg by mouth every 12 (twelve) hours as needed for Pain.       [DISCONTINUED] aspirin (ECOTRIN) 81 MG EC tablet Take 1 tablet (81 mg total) by mouth once daily. 30 tablet 0     [DISCONTINUED] clotrimazole-betamethasone 1-0.05% (LOTRISONE) cream        [DISCONTINUED] cranberry fruit extract (CRANBERRY ORAL) Take by mouth.       [DISCONTINUED] dicyclomine (BENTYL) 10 MG capsule Take 10 mg by mouth 4 (four) times daily before meals and nightly.       [DISCONTINUED] ergocalciferol (VITAMIN D2) 50,000 unit Cap Take 1,000 Units by mouth every 7 days.       [DISCONTINUED] erythromycin (ROMYCIN) ophthalmic ointment Place into the left eye every evening. 3.5 g 0     [DISCONTINUED] escitalopram oxalate (LEXAPRO) 10 MG tablet Take 1 tablet (10 mg total) by mouth once daily. 1 Tablet Oral Every day 15 tablet 0     [DISCONTINUED] ferrous sulfate 325 mg (65 mg iron) Tab tablet Take 325 mg by mouth daily with breakfast.       [DISCONTINUED] gentamicin (GENTAK) 0.3 % (3 mg/gram) ophthalmic ointment Apply in right eye qid x 10 days 20 g 0     [DISCONTINUED] guaifenesin (MUCINEX) 600 mg 12 hr tablet Take 1,200 mg by mouth 2 (two) times daily.       [DISCONTINUED] hydrALAZINE (APRESOLINE) 100 MG tablet Take 1 tablet (100 mg total) by  mouth 3 (three) times daily. 90 tablet 6     [DISCONTINUED] hydrocortisone (PREPARATION H HYDROCORTISONE) 1 % cream Apply topically 2 (two) times daily.       [DISCONTINUED] insulin asp prt-insulin aspart (NOVOLOG MIX 70-30) 100 unit/mL (70-30) Soln Sliding scale       [DISCONTINUED] insulin detemir (LEVEMIR) 100 unit/mL injection Inject 12 Units into the skin once daily.       [DISCONTINUED] lisinopril (PRINIVIL,ZESTRIL) 40 MG tablet Take 40 mg by mouth once daily.       [DISCONTINUED] loperamide (IMODIUM) 2 mg capsule Take 2 mg by mouth 4 (four) times daily as needed for Diarrhea.       [DISCONTINUED] midodrine (PROAMATINE) 5 MG Tab Take 2.5 mg by mouth 2 (two) times daily with meals.       [DISCONTINUED] neomycin-polymyxin-dexamethasone (DEXACINE) 3.5 mg/g-10,000 unit/g-0.1 % Oint every 6 (six) hours.       [DISCONTINUED] neomycin-polymyxin-dexamethasone (MAXITROL) 3.5mg/mL-10,000 unit/mL-0.1 % DrpS Place 1 drop into the right eye 4 (four) times daily. USE 1 DROP 4 TIMES A DAY FOR 1 WEEK INTO THE AFFECTED EYE 5 mL 2     [DISCONTINUED] olanzapine (ZYPREXA) 5 MG tablet Take 5 mg by mouth every evening.       [DISCONTINUED] ondansetron (ZOFRAN) 4 MG tablet Take 4 mg by mouth every 8 (eight) hours as needed for Nausea.       [DISCONTINUED] peg 400-propylene glycol, PF, (SYSTANE ULTRA, PF,) 0.4-0.3 % Dpet Apply to eye.       [DISCONTINUED] rifAMpin (RIFADIN) 300 MG capsule Take 300 mg by mouth once daily.       [DISCONTINUED] sevelamer carbonate (RENVELA) 800 mg Tab Take 800 mg by mouth 3 (three) times daily with meals.       [DISCONTINUED] sodium chloride 0.9% SolP 500 mL with vancomycin 1,000 mg SolR 1,000 mg Irrigate with 1,000 mg as directed once.       [DISCONTINUED] vitamin renal formula, B-complex-vitamin c-folic acid, (NEPHROCAP) 1 mg Cap Take 1 capsule by mouth once daily.                                .

## 2022-03-15 NOTE — SUBJECTIVE & OBJECTIVE
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       Past Surgical History:   Procedure Laterality Date    CATARACT EXTRACTION      MASTECTOMY Right        Review of patient's allergies indicates:  No Known Allergies  Current Facility-Administered Medications   Medication Frequency    acetaminophen tablet 650 mg Q4H PRN    albuterol-ipratropium 2.5 mg-0.5 mg/3 mL nebulizer solution 3 mL Q6H PRN    albuterol-ipratropium 2.5 mg-0.5 mg/3 mL nebulizer solution 3 mL Q6H    aluminum-magnesium hydroxide-simethicone 200-200-20 mg/5 mL suspension 30 mL QID PRN    aspirin chewable tablet 81 mg Daily    budesonide nebulizer solution 0.5 mg Q12H    carvediloL tablet 3.125 mg BID WM    cefTRIAXone (ROCEPHIN) 1 g/50 mL D5W IVPB Q24H    dextrose 10% bolus 125 mL PRN    dextrose 10% bolus 250 mL PRN    dextrose 5 % infusion Continuous    [START ON 3/16/2022] docusate sodium capsule 100 mg Daily    gabapentin capsule 100 mg QHS    glucagon (human recombinant) injection 1 mg PRN    glucose chewable tablet 16 g PRN    glucose chewable tablet 24 g PRN    heparin 25,000 units in dextrose 5% (100 units/ml) IV bolus from bag - ADDITIONAL PRN BOLUS - 30 units/kg (max bolus 4000 units) PRN    heparin 25,000 units in dextrose 5% (100 units/ml) IV bolus from bag - ADDITIONAL PRN BOLUS - 60 units/kg (max bolus 4000 units) PRN    heparin 25,000 units in dextrose 5% 250 mL (100 units/mL) infusion LOW INTENSITY nomogram - [facility] Continuous    hydrALAZINE injection 10 mg Q6H PRN    insulin aspart U-100 pen 0-5 Units QID (AC + HS) PRN    magnesium oxide tablet 800 mg PRN    magnesium oxide tablet 800 mg PRN    melatonin tablet 6 mg Nightly PRN    naloxone  0.4 mg/mL injection 0.02 mg PRN    ondansetron injection 4 mg Q6H PRN    pantoprazole EC tablet 40 mg BID    [START ON 3/16/2022] polyethylene glycol packet 17 g Daily    potassium bicarbonate disintegrating tablet 35 mEq PRN    potassium bicarbonate disintegrating tablet 50 mEq PRN    potassium bicarbonate disintegrating tablet 60 mEq PRN    potassium, sodium phosphates 280-160-250 mg packet 2 packet PRN    potassium, sodium phosphates 280-160-250 mg packet 2 packet PRN    potassium, sodium phosphates 280-160-250 mg packet 2 packet PRN    prochlorperazine injection Soln 5 mg Q6H PRN    simethicone chewable tablet 80 mg QID PRN    sodium chloride 0.9% flush 10 mL Q8H PRN    traMADoL tablet 50 mg Q12H PRN     Family History       Problem Relation (Age of Onset)    Diabetes Maternal Aunt, Paternal Aunt, Child    Glaucoma Maternal Aunt, Paternal Aunt    Heart attack Maternal Aunt (80), Brother (79)          Tobacco Use    Smoking status: Never Smoker    Smokeless tobacco: Never Used   Substance and Sexual Activity    Alcohol use: No    Drug use: No    Sexual activity: Not on file     Review of Systems   Constitutional:  Positive for fatigue.   HENT: Negative.     Respiratory:  Positive for shortness of breath.    Cardiovascular:  Positive for leg swelling. Negative for chest pain.   Gastrointestinal: Negative.    Genitourinary: Negative.    Musculoskeletal: Negative.    Neurological: Negative.    Psychiatric/Behavioral: Negative.     Objective:     Vital Signs (Most Recent):  Temp: 96.8 °F (36 °C) (03/15/22 1731)  Pulse: 79 (03/15/22 1731)  Resp: 17 (03/15/22 1731)  BP: (!) 155/67 (03/15/22 1731)  SpO2: 98 % (03/15/22 1731)  O2 Device (Oxygen Therapy): nasal cannula (03/15/22 1630)   Vital Signs (24h Range):  Temp:  [96.8 °F (36 °C)-97.6 °F (36.4 °C)] 96.8 °F (36 °C)  Pulse:  [72-86] 79  Resp:  [9-24] 17  SpO2:  [87 %-100 %] 98 %  BP: ()/(33-67) 155/67     Weight: 88.5 kg (195 lb 1.7 oz) (03/15/22 1124)  Body  mass index is 29.67 kg/m².  Body surface area is 2.06 meters squared.    No intake/output data recorded.    Physical Exam  Vitals and nursing note reviewed.   Constitutional:       Appearance: Normal appearance.   HENT:      Head: Normocephalic and atraumatic.   Cardiovascular:      Rate and Rhythm: Normal rate and regular rhythm.      Pulses: Normal pulses.      Heart sounds: Normal heart sounds.     No friction rub.   Pulmonary:      Effort: Pulmonary effort is normal. No respiratory distress.      Breath sounds: Rales present.   Abdominal:      General: Abdomen is flat.      Palpations: Abdomen is soft.      Tenderness: There is no abdominal tenderness.   Musculoskeletal:      Right lower leg: Edema present.      Left lower leg: Edema present.      Comments: 3+ edema   Skin:     General: Skin is warm and dry.   Neurological:      General: No focal deficit present.      Mental Status: She is alert and oriented to person, place, and time.   Psychiatric:         Mood and Affect: Mood normal.         Behavior: Behavior normal.       Significant Labs: reviewed  BMP  Lab Results   Component Value Date     03/15/2022    K 4.7 03/15/2022     03/15/2022    CO2 22 (L) 03/15/2022    BUN 30 (H) 03/15/2022    CREATININE 5.6 (H) 03/15/2022    CALCIUM 8.5 (L) 03/15/2022    ANIONGAP 15 03/15/2022    ESTGFRAFRICA 8 (A) 03/15/2022    EGFRNONAA 7 (A) 03/15/2022     Lab Results   Component Value Date    WBC 13.54 (H) 03/15/2022    HGB 10.8 (L) 03/15/2022    HCT 36.5 (L) 03/15/2022     (H) 03/15/2022    PLT 83 (L) 03/15/2022       Recent Labs   Lab 03/15/22  1304   TROPONINI 5.194*     ABG  Recent Labs   Lab 03/15/22  1339   PH 7.305*   PO2 73*   PCO2 45.3*   HCO3 22.6*   BE -4         Significant Imaging: CXR shows moderate pulm edema

## 2022-03-15 NOTE — ASSESSMENT & PLAN NOTE
Patient with Hypoxic Respiratory failure which is Acute.  she is not on home oxygen. Supplemental oxygen was provided and noted-  .   Signs/symptoms of respiratory failure include- increased work of breathing. Contributing diagnoses includes - CHF /Pulmonary edema Labs and images were reviewed. Patient Has recent ABG, which has been reviewed. Will treat underlying causes and adjust management of respiratory failure

## 2022-03-15 NOTE — ASSESSMENT & PLAN NOTE
ESRD on chronic HD q MWF at [facility].  Last HD yesterday, does not miss HD usually  But has always fluid gain, mostly in the legs due to XS water intake, and low tolerance for adequate UF  Has pulm edema, relatively low to moderate O2 sat on 4 L NC  K normal    Will provide some HD and UF to assits with hypoxia and prevent mechanical intubation (2 hours)  But would not dialyze intensely in the period post MI to assist with coronary perfusion

## 2022-03-15 NOTE — HPI
Pt was seen and examined. Labs and meds reviewed. Discussed with other providers. Chart was reviewed. Pt is a81 y/o female with ESRD on chronic HD at [facility] who presented with SOB and was found to have NSTEMI (troponin 5). Pt is compliant with HD, but is always fluid overloaded due to XS fluid intake and low intolerance for adequate UF. Pt currently has no CP, denies SOB, but has relatively low O2 sat on 4 L NC.

## 2022-03-15 NOTE — ASSESSMENT & PLAN NOTE
Daughter states pt's baseline is AAOx3  Etiology of AMS unclear but likely 2/2 prolonged hypoglycemia, infection, and need for HD  Cont IV abx  Nephrology consulted for HD   Neuro checks  Monitor blood glucose  Check ammonia and TSH  MRI brain

## 2022-03-15 NOTE — CONSULTS
[facility] ([facility])  Nephrology  Consult Note    Patient Name: Stephanie Wilder  MRN: [id number]  Admission Date: 3/15/2022  Hospital Length of Stay: 0 days  Attending Provider: Romelia Brunner MD   Primary Care Physician: Garry Vazquez MD  Principal Problem:Encephalopathy, metabolic    Reason for consult: ESRD    Consults  Subjective:     HPI: Pt was seen and examined. Pt was visited in ER, and revisited before HD on th floor. Labs and meds reviewed. Discussed with other providers. Chart was reviewed. Pt is a81 y/o female with ESRD on chronic HD at [facility] who presented with SOB and was found to have NSTEMI (troponin 5). Pt is compliant with HD, but is always fluid overloaded due to XS fluid intake and low intolerance for adequate UF. Pt currently has no CP, denies SOB, but has relatively low O2 sat on 4 L NC.       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       Past Surgical History:   Procedure Laterality Date    CATARACT EXTRACTION      MASTECTOMY Right        Review of patient's allergies indicates:  No Known Allergies  Current Facility-Administered Medications   Medication Frequency    acetaminophen tablet 650 mg Q4H PRN    albuterol-ipratropium 2.5 mg-0.5 mg/3 mL nebulizer solution 3 mL Q6H PRN    albuterol-ipratropium 2.5 mg-0.5 mg/3 mL nebulizer solution 3 mL Q6H    aluminum-magnesium hydroxide-simethicone 200-200-20 mg/5 mL suspension 30 mL QID PRN    aspirin chewable tablet 81 mg Daily    budesonide nebulizer solution 0.5 mg Q12H    carvediloL tablet 3.125 mg BID WM    cefTRIAXone (ROCEPHIN) 1 g/50 mL D5W IVPB Q24H    dextrose 10% bolus 125 mL PRN    dextrose 10%  bolus 250 mL PRN    dextrose 5 % infusion Continuous    [START ON 3/16/2022] docusate sodium capsule 100 mg Daily    gabapentin capsule 100 mg QHS    glucagon (human recombinant) injection 1 mg PRN    glucose chewable tablet 16 g PRN    glucose chewable tablet 24 g PRN    heparin 25,000 units in dextrose 5% (100 units/ml) IV bolus from bag - ADDITIONAL PRN BOLUS - 30 units/kg (max bolus 4000 units) PRN    heparin 25,000 units in dextrose 5% (100 units/ml) IV bolus from bag - ADDITIONAL PRN BOLUS - 60 units/kg (max bolus 4000 units) PRN    heparin 25,000 units in dextrose 5% 250 mL (100 units/mL) infusion LOW INTENSITY nomogram - [facility] Continuous    hydrALAZINE injection 10 mg Q6H PRN    insulin aspart U-100 pen 0-5 Units QID (AC + HS) PRN    magnesium oxide tablet 800 mg PRN    magnesium oxide tablet 800 mg PRN    melatonin tablet 6 mg Nightly PRN    naloxone 0.4 mg/mL injection 0.02 mg PRN    ondansetron injection 4 mg Q6H PRN    pantoprazole EC tablet 40 mg BID    [START ON 3/16/2022] polyethylene glycol packet 17 g Daily    potassium bicarbonate disintegrating tablet 35 mEq PRN    potassium bicarbonate disintegrating tablet 50 mEq PRN    potassium bicarbonate disintegrating tablet 60 mEq PRN    potassium, sodium phosphates 280-160-250 mg packet 2 packet PRN    potassium, sodium phosphates 280-160-250 mg packet 2 packet PRN    potassium, sodium phosphates 280-160-250 mg packet 2 packet PRN    prochlorperazine injection Soln 5 mg Q6H PRN    simethicone chewable tablet 80 mg QID PRN    sodium chloride 0.9% flush 10 mL Q8H PRN    traMADoL tablet 50 mg Q12H PRN     Family History       Problem Relation (Age of Onset)    Diabetes Maternal Aunt, Paternal Aunt, Child    Glaucoma Maternal Aunt, Paternal Aunt    Heart attack Maternal Aunt (80), Brother (79)          Tobacco Use    Smoking status: Never Smoker    Smokeless tobacco: Never Used   Substance and Sexual Activity    Alcohol use: No     Drug use: No    Sexual activity: Not on file     Review of Systems   Constitutional:  Positive for fatigue.   HENT: Negative.     Respiratory:  Positive for shortness of breath.    Cardiovascular:  Positive for leg swelling. Negative for chest pain.   Gastrointestinal: Negative.    Genitourinary: Negative.    Musculoskeletal: Negative.    Neurological: Negative.    Psychiatric/Behavioral: Negative.     Objective:     Vital Signs (Most Recent):  Temp: 96.8 °F (36 °C) (03/15/22 1731)  Pulse: 79 (03/15/22 1731)  Resp: 17 (03/15/22 1731)  BP: (!) 155/67 (03/15/22 1731)  SpO2: 98 % (03/15/22 1731)  O2 Device (Oxygen Therapy): nasal cannula (03/15/22 1630)   Vital Signs (24h Range):  Temp:  [96.8 °F (36 °C)-97.6 °F (36.4 °C)] 96.8 °F (36 °C)  Pulse:  [72-86] 79  Resp:  [9-24] 17  SpO2:  [87 %-100 %] 98 %  BP: ()/(33-67) 155/67     Weight: 88.5 kg (195 lb 1.7 oz) (03/15/22 1124)  Body mass index is 29.67 kg/m².  Body surface area is 2.06 meters squared.    No intake/output data recorded.    Physical Exam  Vitals and nursing note reviewed.   Constitutional:       Appearance: Normal appearance.   HENT:      Head: Normocephalic and atraumatic.   Cardiovascular:      Rate and Rhythm: Normal rate and regular rhythm.      Pulses: Normal pulses.      Heart sounds: Normal heart sounds.     No friction rub.   Pulmonary:      Effort: Pulmonary effort is normal. No respiratory distress.      Breath sounds: Rales present.   Abdominal:      General: Abdomen is flat.      Palpations: Abdomen is soft.      Tenderness: There is no abdominal tenderness.   Musculoskeletal:      Right lower leg: Edema present.      Left lower leg: Edema present.      Comments: 3+ edema   Skin:     General: Skin is warm and dry.   Neurological:      General: No focal deficit present.      Mental Status: She is alert and oriented to person, place, and time.   Psychiatric:         Mood and Affect: Mood normal.         Behavior: Behavior normal.        Significant Labs: reviewed  BMP  Lab Results   Component Value Date     03/15/2022    K 4.7 03/15/2022     03/15/2022    CO2 22 (L) 03/15/2022    BUN 30 (H) 03/15/2022    CREATININE 5.6 (H) 03/15/2022    CALCIUM 8.5 (L) 03/15/2022    ANIONGAP 15 03/15/2022    ESTGFRAFRICA 8 (A) 03/15/2022    EGFRNONAA 7 (A) 03/15/2022     Lab Results   Component Value Date    WBC 13.54 (H) 03/15/2022    HGB 10.8 (L) 03/15/2022    HCT 36.5 (L) 03/15/2022     (H) 03/15/2022    PLT 83 (L) 03/15/2022       Recent Labs   Lab 03/15/22  1304   TROPONINI 5.194*     ABG  Recent Labs   Lab 03/15/22  1339   PH 7.305*   PO2 73*   PCO2 45.3*   HCO3 22.6*   BE -4         Significant Imaging: CXR shows moderate pulm edema    Assessment/Plan:     82 y/o female with ESRD on chronic HD presented with SOB and NSTEMI:    End stage renal disease  ESRD on chronic HD q MWF at [facility].  Last HD yesterday, does not miss HD usually  But has always fluid gain, mostly in the legs due to XS water intake, and low tolerance for adequate UF  Has pulm edema, relatively low to moderate O2 sat on 4 L NC  K normal    Will provide some HD and UF to assits with hypoxia and prevent mechanical intubation (2 hours)  But would not dialyze intensely in the period post MI to assist with coronary perfusion    NSTEMI (non-ST elevated myocardial infarction)  Reviewed the chart  H/o of CAD, past MI, diasttolic CHF  Discussed with ER  Agree with current mgmt    Acute hypoxemic respiratory failure  CXR reviewed  Mild respiratory acidosis on ABG  As addressed above    DM (diabetes mellitus)  Reviewed the chart      Plans and recommendations:  As discussed above  Total time spent 70 minutes including time needed to review the records, the   patient evaluation, documentation, face-to-face discussion with the patient,   more than 50% of the time was spent on coordination of care and counseling.    Level V visit.  Multiple medical issues were addressed,  as documented. Medical care provided was in addition to providing dialysis. Pt received multiple visits and evaluations.      Blayne Jang MD   Nephrology  [facility] - Telemetry ([facility])

## 2022-03-15 NOTE — H&P
[facility] - Telemetry ([facility]  History & Physical    Patient Name: Stephanie Wilder  MRN: [id number]  Patient Class: OP- Observation  Admission Date: 3/15/2022  Attending Physician: Dr. Brunner   Primary Care Provider: Garry Vazquez MD         Patient information was obtained from nursing home, caregiver / friend and ER records.     Subjective:     Principal Problem:Encephalopathy, metabolic    Chief Complaint:   Chief Complaint   Patient presents with    Hypoglycemia     CBG 40 at nursing home.         HPI:  The patient is a 80 yo female with DM, HTN, ESRD on HD, GERD, Bipolar d/o, Breast cancer, Tuberculosis, mild cognitive impairment, Right eye blindness, and hx MRSA bacteremia with mitral vegetation -source thought to be right thigh graft but pt refused removal who presented to ED after being found at [facility] unresponsive, hypoxemic, and hypoglycemic with CBG of 40.  At her nursing home, [facility]I placed a non-rebreather mask and gave 2 mg glucagon IM. AASI then weaned O2 to a nasal cannula and administered 1 amp D5W.  On arrival to ED, pt was on 2 liters NC, CT head showed nothing acute. CXR showed pulmonary edema. EKG showed NSR with 1 st degree AV block, RBB, nonspecific changes. Troponin 5.1. BNP 4295. WBC 13, Mildy elevated LFTs, INR 1.4, +UTI. Glucose 143.    Pt is drowsy, disoriented x 3, arouses to tactile stimulation, and only mumbles incomprehensible words.   Nephrology and cardiology consulted  Addendum: The patient was placed in observation under hospital medicine.     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       Past Surgical History:   Procedure Laterality Date     CATARACT EXTRACTION      MASTECTOMY Right        Review of patient's allergies indicates:  No Known Allergies    No current facility-administered medications on file prior to encounter.     Current Outpatient Medications on File Prior to Encounter   Medication Sig    acetaminophen (TYLENOL) 325 MG tablet Take 650 mg by mouth every 8 (eight) hours.    amLODIPine (NORVASC) 10 MG tablet Take 1 tablet (10 mg total) by mouth once daily.    benzonatate (TESSALON) 100 MG capsule Take 100 mg by mouth 3 (three) times daily as needed for Cough.    calcium carbonate (OS-APARNA) 500 mg calcium (1,250 mg) chewable tablet Take 2 tablets by mouth once daily.    carvediloL (COREG) 25 MG tablet Take 1 tablet (25 mg total) by mouth 2 (two) times daily with meals. (Patient taking differently: Take 25 mg by mouth 2 (two) times daily with meals. (on Monday, Wednesday, & Friday))    cetirizine (ZYRTEC) 10 MG tablet Take 10 mg by mouth once daily.    cholecalciferol, vitamin D3, (VITAMIN D3) 25 mcg (1,000 unit) capsule Take 1 capsule by mouth once daily.    cloNIDine (CATAPRES) 0.1 MG tablet Take 0.1 mg by mouth every 8 (eight) hours as needed (hypertension).    docusate sodium (COLACE) 100 MG capsule Take 100 mg by mouth once daily. (on Tuesday, Thursday, Saturday, & Sunday)    doxycycline (VIBRAMYCIN) 100 MG Cap Take 100 mg by mouth once daily.    ethyl chloride 100% 100 % spray Apply topically as needed (to access cannulation once daily on Monday, Wednesday, & Friday).    ferric citrate (AURYXIA ORAL) Take 1 tablet by mouth 2 (two) times daily with meals.    gabapentin (NEURONTIN) 100 MG capsule Take 100 mg by mouth every evening.    hydrocortisone (ANUSOL-HC) 2.5 % rectal cream Apply to hemorrhoids every 12 hours    hydrOXYzine pamoate (VISTARIL) 25 MG Cap Take 25 mg by mouth every 6 (six) hours as needed (allergies).    insulin aspart U-100 (NOVOLOG) 100 unit/mL injection Inject into the skin 3 (three) times daily  before meals. using sliding scale    melatonin 5 mg Cap Take 1 capsule by mouth nightly.    mirtazapine (REMERON SOL-TAB) 15 MG disintegrating tablet Take 15 mg by mouth every evening.    pantoprazole (PROTONIX) 40 MG tablet Take 1 tablet (40 mg total) by mouth 2 (two) times daily.    polyethylene glycol (GLYCOLAX) 17 gram PwPk Take 17 g by mouth once daily.    polyvinyl alcohol-povidone (ARTIFICIAL TEARS,PVALCH-POVID,) 0.5-0.6 % Drop Place 1 drop into both eyes every 12 (twelve) hours as needed (dry eyes).    pravastatin (PRAVACHOL) 40 MG tablet Take 1 tablet (40 mg total) by mouth once daily.    psyllium (METAMUCIL) powder Take 1 packet by mouth once daily.    traMADol (ULTRAM) 50 mg tablet Take 50 mg by mouth every 12 (twelve) hours as needed for Pain.    [DISCONTINUED] aspirin (ECOTRIN) 81 MG EC tablet Take 1 tablet (81 mg total) by mouth once daily.    [DISCONTINUED] clotrimazole-betamethasone 1-0.05% (LOTRISONE) cream     [DISCONTINUED] cranberry fruit extract (CRANBERRY ORAL) Take by mouth.    [DISCONTINUED] dicyclomine (BENTYL) 10 MG capsule Take 10 mg by mouth 4 (four) times daily before meals and nightly.    [DISCONTINUED] ergocalciferol (VITAMIN D2) 50,000 unit Cap Take 1,000 Units by mouth every 7 days.    [DISCONTINUED] erythromycin (ROMYCIN) ophthalmic ointment Place into the left eye every evening.    [DISCONTINUED] escitalopram oxalate (LEXAPRO) 10 MG tablet Take 1 tablet (10 mg total) by mouth once daily. 1 Tablet Oral Every day    [DISCONTINUED] ferrous sulfate 325 mg (65 mg iron) Tab tablet Take 325 mg by mouth daily with breakfast.    [DISCONTINUED] gentamicin (GENTAK) 0.3 % (3 mg/gram) ophthalmic ointment Apply in right eye qid x 10 days    [DISCONTINUED] guaifenesin (MUCINEX) 600 mg 12 hr tablet Take 1,200 mg by mouth 2 (two) times daily.    [DISCONTINUED] hydrALAZINE (APRESOLINE) 100 MG tablet Take 1 tablet (100 mg total) by mouth 3 (three) times daily.    [DISCONTINUED]  hydrocortisone (PREPARATION H HYDROCORTISONE) 1 % cream Apply topically 2 (two) times daily.    [DISCONTINUED] insulin asp prt-insulin aspart (NOVOLOG MIX 70-30) 100 unit/mL (70-30) Soln Sliding scale    [DISCONTINUED] insulin detemir (LEVEMIR) 100 unit/mL injection Inject 12 Units into the skin once daily.    [DISCONTINUED] lisinopril (PRINIVIL,ZESTRIL) 40 MG tablet Take 40 mg by mouth once daily.    [DISCONTINUED] loperamide (IMODIUM) 2 mg capsule Take 2 mg by mouth 4 (four) times daily as needed for Diarrhea.    [DISCONTINUED] midodrine (PROAMATINE) 5 MG Tab Take 2.5 mg by mouth 2 (two) times daily with meals.    [DISCONTINUED] neomycin-polymyxin-dexamethasone (DEXACINE) 3.5 mg/g-10,000 unit/g-0.1 % Oint every 6 (six) hours.    [DISCONTINUED] neomycin-polymyxin-dexamethasone (MAXITROL) 3.5mg/mL-10,000 unit/mL-0.1 % DrpS Place 1 drop into the right eye 4 (four) times daily. USE 1 DROP 4 TIMES A DAY FOR 1 WEEK INTO THE AFFECTED EYE    [DISCONTINUED] olanzapine (ZYPREXA) 5 MG tablet Take 5 mg by mouth every evening.    [DISCONTINUED] ondansetron (ZOFRAN) 4 MG tablet Take 4 mg by mouth every 8 (eight) hours as needed for Nausea.    [DISCONTINUED] peg 400-propylene glycol, PF, (SYSTANE ULTRA, PF,) 0.4-0.3 % Dpet Apply to eye.    [DISCONTINUED] rifAMpin (RIFADIN) 300 MG capsule Take 300 mg by mouth once daily.    [DISCONTINUED] sevelamer carbonate (RENVELA) 800 mg Tab Take 800 mg by mouth 3 (three) times daily with meals.    [DISCONTINUED] sodium chloride 0.9% SolP 500 mL with vancomycin 1,000 mg SolR 1,000 mg Irrigate with 1,000 mg as directed once.    [DISCONTINUED] vitamin renal formula, B-complex-vitamin c-folic acid, (NEPHROCAP) 1 mg Cap Take 1 capsule by mouth once daily.     Family History       Problem Relation (Age of Onset)    Diabetes Maternal Aunt, Paternal Aunt, Child    Glaucoma Maternal Aunt, Paternal Aunt    Heart attack Maternal Aunt (80), Brother (79)          Tobacco Use    Smoking  status: Never Smoker    Smokeless tobacco: Never Used   Substance and Sexual Activity    Alcohol use: No    Drug use: No    Sexual activity: Not on file     Review of Systems   Unable to perform ROS: Mental status change   Objective:     Vital Signs (Most Recent):  Temp: 96.8 °F (36 °C) (03/15/22 1731)  Pulse: 79 (03/15/22 1731)  Resp: 17 (03/15/22 1731)  BP: (!) 155/67 (03/15/22 1731)  SpO2: 98 % (03/15/22 1731)   Vital Signs (24h Range):  Temp:  [96.8 °F (36 °C)-97.6 °F (36.4 °C)] 96.8 °F (36 °C)  Pulse:  [72-86] 79  Resp:  [9-24] 17  SpO2:  [87 %-100 %] 98 %  BP: ()/(33-67) 155/67     Weight: 88.5 kg (195 lb 1.7 oz)  Body mass index is 29.67 kg/m².    Physical Exam  Vitals and nursing note reviewed.   Constitutional:       General: She is not in acute distress.     Appearance: She is well-developed. She is ill-appearing. She is not diaphoretic.   HENT:      Head: Normocephalic and atraumatic.      Nose: Nose normal.   Eyes:      General: No scleral icterus.     Extraocular Movements: EOM normal.      Comments: Right eye with stigmata of blindness    Neck:      Trachea: No tracheal deviation.   Cardiovascular:      Rate and Rhythm: Normal rate and regular rhythm.      Heart sounds: Normal heart sounds. No murmur heard.    No friction rub. No gallop.   Pulmonary:      Effort: Pulmonary effort is normal. No respiratory distress.      Breath sounds: No stridor. Rales present. No wheezing.   Chest:      Chest wall: No tenderness.   Abdominal:      General: Bowel sounds are normal. There is no distension.      Palpations: Abdomen is soft. There is no mass.      Tenderness: There is no abdominal tenderness. There is no guarding or rebound.   Musculoskeletal:         General: No tenderness or deformity. Normal range of motion.      Cervical back: Normal range of motion and neck supple.   Skin:     General: Skin is warm and dry.      Coloration: Skin is not pale.      Findings: No erythema or rash.       Comments: Scaly, dry skin to bilateral lower legs and feet   Neurological:      Mental Status: She is lethargic and disoriented.      Motor: No abnormal muscle tone.      Comments: Lethargic and disoriented  Pt only mumbles incomprehensible sounds   Does not follow commands    Psychiatric:         Cognition and Memory: Cognition is impaired.         CRANIAL NERVES     CN III, IV, VI   Extraocular motions are normal.      Significant Labs: All pertinent labs within the past 24 hours have been reviewed.    Significant Imaging: I have reviewed all pertinent imaging results/findings within the past 24 hours.    Assessment/Plan:     * Encephalopathy, metabolic  Daughter states pt's baseline is AAOx3  Etiology of AMS unclear but likely 2/2 prolonged hypoglycemia, infection, and need for HD  Cont IV abx  Nephrology consulted for HD   Neuro checks  Monitor blood glucose  Check ammonia and TSH  MRI brain       NSTEMI (non-ST elevated myocardial infarction)  Serial troponin   IV heparin, ASA, BB  No statin 2/2 elevated LFTs  Consult Cardiology   Echo       Diastolic CHF, acute on chronic  Patient is identified as having Diastolic (HFpEF) heart failure that is Acute on chronic. CHF is currently uncontrolled due to Rales/crackles on pulmonary exam and Pulmonary edema/pleural effusion on CXR. Latest ECHO performed and demonstrates- No results found for this or any previous visit.  . Continue Beta Blocker and ARNI and monitor clinical status closely. Monitor on telemetry. Patient is on CHF pathway.  Monitor strict Is&Os and daily weights.  Place on fluid restriction of 2 L. Continue to stress to patient importance of self efficacy and  on diet for CHF. Last BNP reviewed- and noted below   Recent Labs   Lab 03/15/22  1304   BNP 4,245*   .  Ultrafiltration with HD    Acute hypoxemic respiratory failure  Patient with Hypoxic Respiratory failure which is Acute.  she is not on home oxygen. Supplemental oxygen was provided and  noted-  .   Signs/symptoms of respiratory failure include- increased work of breathing. Contributing diagnoses includes - CHF /Pulmonary edema Labs and images were reviewed. Patient Has recent ABG, which has been reviewed. Will treat underlying causes and adjust management of respiratory failure     Hypoglycemia  CBG was 143 on arrival   Pt has AMS and can not eat  Glucose 121 at present   Will start D5W at 20ml/hour       Elevated LFTs  Hepatitis panel   Avoid hepatoxic medications   Monitor       Renovascular hypertension  BP variable   Pt unable to swallow  Hydralazine prn       CRLD (chronic restrictive lung disease)  ADELIA and ICS neb txs       DM (diabetes mellitus)  NISS      End stage renal disease  Nephrology consulted         VTE Risk Mitigation (From admission, onward)         Ordered     Reason for No Pharmacological VTE Prophylaxis  Once        Question:  Reasons:  Answer:  IV Heparin w/in 24 hrs. Pre or Post-Op    03/15/22 1734     IP VTE HIGH RISK PATIENT  Once         03/15/22 1734     Place sequential compression device  Until discontinued         03/15/22 1734     heparin 25,000 units in dextrose 5% (100 units/ml) IV bolus from bag - ADDITIONAL PRN BOLUS - 30 units/kg (max bolus 4000 units)  As needed (PRN)        Question:  Heparin Infusion Adjustment (DO NOT MODIFY ANSWER)  Answer:  \\[email or web address]\ebindle\Images\Pharmacy\HeparinInfusions\heparin LOW INTENSITY nomogram for [facility] [id number]    03/15/22 1519     heparin 25,000 units in dextrose 5% (100 units/ml) IV bolus from bag - ADDITIONAL PRN BOLUS - 60 units/kg (max bolus 4000 units)  As needed (PRN)        Question:  Heparin Infusion Adjustment (DO NOT MODIFY ANSWER)  Answer:  \[email or web address]\epic\Images\Pharmacy\HeparinInfusions\heparin LOW INTENSITY nomogram for [facility] [id number]    03/15/22 1519     heparin 25,000 units in dextrose 5% 250 mL (100 units/mL) infusion LOW INTENSITY nomogram - [facility]  Continuous        Question Answer Comment   Heparin  Infusion Adjustment (DO NOT MODIFY ANSWER) [email or web address]\epic\Images\Pharmacy\HeparinInfusions\heparin LOW INTENSITY nomogram for [facility] [id number]    Begin at (in units/kg/hr) 12        03/15/22 2369                   Gayatri Bennett NP  Department of Hospital Medicine   [facility] - Telemetry ([facility])

## 2022-03-15 NOTE — ED NOTES
ekg in progress,     Pt moaning, responds to voice at times, dows not follow commands  Skin w/d, hr reg, sr, 1degree av block, bbb, not ectopy noted  SL intact to R forearm, flushes easily    Prweviously w/ d5w , MD order to discontinue      unabae to complete swallowing test at this time, pt not alert

## 2022-03-15 NOTE — ASSESSMENT & PLAN NOTE
CBG was 143 on arrival   Pt has AMS and can not eat  Glucose 121 at present   Will start D5W at 20ml/hour

## 2022-03-15 NOTE — ASSESSMENT & PLAN NOTE
Patient is identified as having Diastolic (HFpEF) heart failure that is Acute on chronic. CHF is currently uncontrolled due to Rales/crackles on pulmonary exam and Pulmonary edema/pleural effusion on CXR. Latest ECHO performed and demonstrates- No results found for this or any previous visit.  . Continue Beta Blocker and ARNI and monitor clinical status closely. Monitor on telemetry. Patient is on CHF pathway.  Monitor strict Is&Os and daily weights.  Place on fluid restriction of 2 L. Continue to stress to patient importance of self efficacy and  on diet for CHF. Last BNP reviewed- and noted below   Recent Labs   Lab 03/15/22  1304   BNP 4,245*   .  Ultrafiltration with HD

## 2022-03-16 PROBLEM — N30.00 ACUTE CYSTITIS WITHOUT HEMATURIA: Status: ACTIVE | Noted: 2022-03-16

## 2022-03-16 LAB
ALBUMIN SERPL BCP-MCNC: 3 G/DL (ref 3.5–5.2)
ALP SERPL-CCNC: 159 U/L (ref 55–135)
ALT SERPL W/O P-5'-P-CCNC: 237 U/L (ref 10–44)
ANION GAP SERPL CALC-SCNC: 14 MMOL/L (ref 8–16)
AORTIC ROOT ANNULUS: 3.37 CM
APTT BLDCRRT: 29.3 SEC (ref 21–32)
APTT BLDCRRT: 32.7 SEC (ref 21–32)
APTT BLDCRRT: 38.7 SEC (ref 21–32)
APTT BLDCRRT: 48 SEC (ref 21–32)
ASCENDING AORTA: 3.21 CM
AST SERPL-CCNC: 296 U/L (ref 10–40)
AV INDEX (PROSTH): 0.65
AV MEAN GRADIENT: 6 MMHG
AV PEAK GRADIENT: 9 MMHG
AV VALVE AREA: 1.91 CM2
AV VELOCITY RATIO: 0.64
BASOPHILS # BLD AUTO: 0.02 K/UL (ref 0–0.2)
BASOPHILS NFR BLD: 0.2 % (ref 0–1.9)
BILIRUB SERPL-MCNC: 0.6 MG/DL (ref 0.1–1)
BSA FOR ECHO PROCEDURE: 1.75 M2
BUN SERPL-MCNC: 28 MG/DL (ref 8–23)
CALCIUM SERPL-MCNC: 8.3 MG/DL (ref 8.7–10.5)
CHLORIDE SERPL-SCNC: 100 MMOL/L (ref 95–110)
CHOLEST SERPL-MCNC: 86 MG/DL (ref 120–199)
CHOLEST/HDLC SERPL: 2.3 {RATIO} (ref 2–5)
CO2 SERPL-SCNC: 23 MMOL/L (ref 23–29)
CREAT SERPL-MCNC: 4.9 MG/DL (ref 0.5–1.4)
CV ECHO LV RWT: 0.86 CM
DIFFERENTIAL METHOD: ABNORMAL
DOP CALC AO PEAK VEL: 1.51 M/S
DOP CALC AO VTI: 36.8 CM
DOP CALC LVOT AREA: 2.9 CM2
DOP CALC LVOT DIAMETER: 1.93 CM
DOP CALC LVOT PEAK VEL: 0.96 M/S
DOP CALC LVOT STROKE VOLUME: 70.18 CM3
DOP CALC RVOT PEAK VEL: 0.38 M/S
DOP CALC RVOT VTI: 8.2 CM
DOP CALCLVOT PEAK VEL VTI: 24 CM
E WAVE DECELERATION TIME: 166.1 MSEC
E/A RATIO: 0.9
E/E' RATIO: 15 M/S
ECHO EF ESTIMATED: 62 %
ECHO LV POSTERIOR WALL: 1.77 CM (ref 0.6–1.1)
EJECTION FRACTION: 60 %
EOSINOPHIL # BLD AUTO: 0.1 K/UL (ref 0–0.5)
EOSINOPHIL NFR BLD: 1.4 % (ref 0–8)
ERYTHROCYTE [DISTWIDTH] IN BLOOD BY AUTOMATED COUNT: 15.7 % (ref 11.5–14.5)
EST. GFR  (AFRICAN AMERICAN): 9 ML/MIN/1.73 M^2
EST. GFR  (NON AFRICAN AMERICAN): 8 ML/MIN/1.73 M^2
FRACTIONAL SHORTENING: 33 % (ref 28–44)
GLUCOSE SERPL-MCNC: 84 MG/DL (ref 70–110)
HCT VFR BLD AUTO: 34.2 % (ref 37–48.5)
HDLC SERPL-MCNC: 37 MG/DL (ref 40–75)
HDLC SERPL: 43 % (ref 20–50)
HGB BLD-MCNC: 10.3 G/DL (ref 12–16)
IMM GRANULOCYTES # BLD AUTO: 0.04 K/UL (ref 0–0.04)
IMM GRANULOCYTES NFR BLD AUTO: 0.5 % (ref 0–0.5)
INTERVENTRICULAR SEPTUM: 1.54 CM (ref 0.6–1.1)
IVC DIAMETER: 2.14 CM
IVRT: 71.36 MSEC
LA MAJOR: 3.63 CM
LA MINOR: 3.02 CM
LA WIDTH: 3.1 CM
LDLC SERPL CALC-MCNC: 37.6 MG/DL (ref 63–159)
LEFT ATRIUM SIZE: 4.03 CM
LEFT ATRIUM VOLUME INDEX: 19.9 ML/M2
LEFT ATRIUM VOLUME: 35.01 CM3
LEFT INTERNAL DIMENSION IN SYSTOLE: 2.76 CM (ref 2.1–4)
LEFT VENTRICLE DIASTOLIC VOLUME INDEX: 42.21 ML/M2
LEFT VENTRICLE DIASTOLIC VOLUME: 74.29 ML
LEFT VENTRICLE MASS INDEX: 160 G/M2
LEFT VENTRICLE SYSTOLIC VOLUME INDEX: 16.2 ML/M2
LEFT VENTRICLE SYSTOLIC VOLUME: 28.51 ML
LEFT VENTRICULAR INTERNAL DIMENSION IN DIASTOLE: 4.1 CM (ref 3.5–6)
LEFT VENTRICULAR MASS: 281.77 G
LV LATERAL E/E' RATIO: 12 M/S
LV SEPTAL E/E' RATIO: 20 M/S
LVOT MG: 2.27 MMHG
LVOT MV: 0.71 CM/S
LYMPHOCYTES # BLD AUTO: 1.2 K/UL (ref 1–4.8)
LYMPHOCYTES NFR BLD: 14.7 % (ref 18–48)
MCH RBC QN AUTO: 32.6 PG (ref 27–31)
MCHC RBC AUTO-ENTMCNC: 30.1 G/DL (ref 32–36)
MCV RBC AUTO: 108 FL (ref 82–98)
MONOCYTES # BLD AUTO: 1.1 K/UL (ref 0.3–1)
MONOCYTES NFR BLD: 13.5 % (ref 4–15)
MV PEAK A VEL: 1.33 M/S
MV PEAK E VEL: 1.2 M/S
MV STENOSIS PRESSURE HALF TIME: 48.17 MS
MV VALVE AREA P 1/2 METHOD: 4.57 CM2
NEUTROPHILS # BLD AUTO: 5.6 K/UL (ref 1.8–7.7)
NEUTROPHILS NFR BLD: 69.7 % (ref 38–73)
NONHDLC SERPL-MCNC: 49 MG/DL
NRBC BLD-RTO: 2 /100 WBC
PISA TR MAX VEL: 3.24 M/S
PLATELET # BLD AUTO: 91 K/UL (ref 150–450)
PMV BLD AUTO: 10.7 FL (ref 9.2–12.9)
POCT GLUCOSE: 103 MG/DL (ref 70–110)
POCT GLUCOSE: 109 MG/DL (ref 70–110)
POCT GLUCOSE: 78 MG/DL (ref 70–110)
POCT GLUCOSE: 94 MG/DL (ref 70–110)
POTASSIUM SERPL-SCNC: 4.3 MMOL/L (ref 3.5–5.1)
PROT SERPL-MCNC: 7.4 G/DL (ref 6–8.4)
PV MEAN GRADIENT: 0.36 MMHG
RA MAJOR: 4.34 CM
RA WIDTH: 4.39 CM
RBC # BLD AUTO: 3.16 M/UL (ref 4–5.4)
RIGHT VENTRICULAR END-DIASTOLIC DIMENSION: 4.34 CM
SINUS: 3.17 CM
SODIUM SERPL-SCNC: 137 MMOL/L (ref 136–145)
STJ: 3.09 CM
TDI LATERAL: 0.1 M/S
TDI SEPTAL: 0.06 M/S
TDI: 0.08 M/S
TR MAX PG: 42 MMHG
TRICUSPID ANNULAR PLANE SYSTOLIC EXCURSION: 1.62 CM
TRIGL SERPL-MCNC: 57 MG/DL (ref 30–150)
TROPONIN I SERPL DL<=0.01 NG/ML-MCNC: 11.45 NG/ML (ref 0–0.03)
TROPONIN I SERPL DL<=0.01 NG/ML-MCNC: 11.58 NG/ML (ref 0–0.03)
TROPONIN I SERPL DL<=0.01 NG/ML-MCNC: 13.16 NG/ML (ref 0–0.03)
TROPONIN I SERPL DL<=0.01 NG/ML-MCNC: 13.71 NG/ML (ref 0–0.03)
WBC # BLD AUTO: 8.01 K/UL (ref 3.9–12.7)

## 2022-03-16 PROCEDURE — 94640 AIRWAY INHALATION TREATMENT: CPT

## 2022-03-16 PROCEDURE — 97530 THERAPEUTIC ACTIVITIES: CPT

## 2022-03-16 PROCEDURE — 25000003 PHARM REV CODE 250: Performed by: NURSE PRACTITIONER

## 2022-03-16 PROCEDURE — 36415 COLL VENOUS BLD VENIPUNCTURE: CPT | Performed by: FAMILY MEDICINE

## 2022-03-16 PROCEDURE — 25000242 PHARM REV CODE 250 ALT 637 W/ HCPCS: Performed by: NURSE PRACTITIONER

## 2022-03-16 PROCEDURE — 21400001 HC TELEMETRY ROOM

## 2022-03-16 PROCEDURE — 99214 OFFICE O/P EST MOD 30 MIN: CPT | Mod: ,,, | Performed by: INTERNAL MEDICINE

## 2022-03-16 PROCEDURE — 99220 PR INITIAL OBSERVATION CARE,LEVL III: ICD-10-PCS | Mod: ,,, | Performed by: PHYSICIAN ASSISTANT

## 2022-03-16 PROCEDURE — 25000003 PHARM REV CODE 250: Performed by: EMERGENCY MEDICINE

## 2022-03-16 PROCEDURE — 85730 THROMBOPLASTIN TIME PARTIAL: CPT | Mod: 91 | Performed by: FAMILY MEDICINE

## 2022-03-16 PROCEDURE — 27000221 HC OXYGEN, UP TO 24 HOURS

## 2022-03-16 PROCEDURE — 85025 COMPLETE CBC W/AUTO DIFF WBC: CPT | Performed by: EMERGENCY MEDICINE

## 2022-03-16 PROCEDURE — 94761 N-INVAS EAR/PLS OXIMETRY MLT: CPT

## 2022-03-16 PROCEDURE — 84484 ASSAY OF TROPONIN QUANT: CPT | Performed by: NURSE PRACTITIONER

## 2022-03-16 PROCEDURE — 80061 LIPID PANEL: CPT | Performed by: NURSE PRACTITIONER

## 2022-03-16 PROCEDURE — 97162 PT EVAL MOD COMPLEX 30 MIN: CPT

## 2022-03-16 PROCEDURE — 99214 PR OFFICE/OUTPT VISIT, EST, LEVL IV, 30-39 MIN: ICD-10-PCS | Mod: ,,, | Performed by: INTERNAL MEDICINE

## 2022-03-16 PROCEDURE — 99220 PR INITIAL OBSERVATION CARE,LEVL III: CPT | Mod: ,,, | Performed by: PHYSICIAN ASSISTANT

## 2022-03-16 PROCEDURE — 63600175 PHARM REV CODE 636 W HCPCS: Performed by: EMERGENCY MEDICINE

## 2022-03-16 PROCEDURE — 97530 THERAPEUTIC ACTIVITIES: CPT | Performed by: PHYSICAL THERAPIST

## 2022-03-16 PROCEDURE — 97166 OT EVAL MOD COMPLEX 45 MIN: CPT | Performed by: PHYSICAL THERAPIST

## 2022-03-16 PROCEDURE — 99900035 HC TECH TIME PER 15 MIN (STAT)

## 2022-03-16 PROCEDURE — 80053 COMPREHEN METABOLIC PANEL: CPT | Performed by: NURSE PRACTITIONER

## 2022-03-16 PROCEDURE — 36415 COLL VENOUS BLD VENIPUNCTURE: CPT | Performed by: NURSE PRACTITIONER

## 2022-03-16 RX ORDER — METRONIDAZOLE 10 MG/G
GEL TOPICAL 2 TIMES DAILY
Status: DISCONTINUED | OUTPATIENT
Start: 2022-03-16 | End: 2022-03-18 | Stop reason: HOSPADM

## 2022-03-16 RX ADMIN — IPRATROPIUM BROMIDE AND ALBUTEROL SULFATE 3 ML: 2.5; .5 SOLUTION RESPIRATORY (INHALATION) at 01:03

## 2022-03-16 RX ADMIN — MICONAZOLE NITRATE: 2 OINTMENT TOPICAL at 08:03

## 2022-03-16 RX ADMIN — CEFTRIAXONE 1 G: 1 INJECTION, SOLUTION INTRAVENOUS at 01:03

## 2022-03-16 RX ADMIN — PANTOPRAZOLE SODIUM 40 MG: 40 TABLET, DELAYED RELEASE ORAL at 12:03

## 2022-03-16 RX ADMIN — BUDESONIDE 0.5 MG: 0.5 INHALANT ORAL at 07:03

## 2022-03-16 RX ADMIN — HEPARIN SODIUM 14 UNITS/KG/HR: 1000 INJECTION, SOLUTION INTRAVENOUS; SUBCUTANEOUS at 01:03

## 2022-03-16 RX ADMIN — ASPIRIN 81 MG CHEWABLE TABLET 81 MG: 81 TABLET CHEWABLE at 09:03

## 2022-03-16 RX ADMIN — GABAPENTIN 100 MG: 100 CAPSULE ORAL at 12:03

## 2022-03-16 RX ADMIN — MICONAZOLE NITRATE: 2 OINTMENT TOPICAL at 12:03

## 2022-03-16 RX ADMIN — IPRATROPIUM BROMIDE AND ALBUTEROL SULFATE 3 ML: 2.5; .5 SOLUTION RESPIRATORY (INHALATION) at 07:03

## 2022-03-16 RX ADMIN — METRONIDAZOLE: 10 GEL TOPICAL at 05:03

## 2022-03-16 RX ADMIN — PANTOPRAZOLE SODIUM 40 MG: 40 TABLET, DELAYED RELEASE ORAL at 08:03

## 2022-03-16 RX ADMIN — HEPARIN SODIUM 14 UNITS/KG/HR: 1000 INJECTION, SOLUTION INTRAVENOUS; SUBCUTANEOUS at 03:03

## 2022-03-16 RX ADMIN — CARVEDILOL 3.12 MG: 3.12 TABLET, FILM COATED ORAL at 05:03

## 2022-03-16 RX ADMIN — GABAPENTIN 100 MG: 100 CAPSULE ORAL at 08:03

## 2022-03-16 RX ADMIN — HEPARIN SODIUM 12 UNITS/KG/HR: 1000 INJECTION, SOLUTION INTRAVENOUS; SUBCUTANEOUS at 12:03

## 2022-03-16 RX ADMIN — METRONIDAZOLE: 10 GEL TOPICAL at 11:03

## 2022-03-16 RX ADMIN — PANTOPRAZOLE SODIUM 40 MG: 40 TABLET, DELAYED RELEASE ORAL at 09:03

## 2022-03-16 RX ADMIN — CARVEDILOL 3.12 MG: 3.12 TABLET, FILM COATED ORAL at 09:03

## 2022-03-16 RX ADMIN — DEXTROSE: 5 SOLUTION INTRAVENOUS at 12:03

## 2022-03-16 NOTE — AI DETERIORATION ALERT
Patient Deterioration Alert     Deterioration alert received. Patient seen and examined, in NAD. VS remain stable. No acute change in patient status. Continue current treatment plan.        Ave Luna, NP

## 2022-03-16 NOTE — ASSESSMENT & PLAN NOTE
82 y/o female with ESRD on chronic HD presented with SOB and NSTEMI:     End stage renal disease  ESRD on chronic HD q MWF at [facility].  Last HD yesterday, does not miss HD usually  But has always fluid gain, mostly in the legs due to XS water intake, and low tolerance for adequate UF  Has pulm edema, relatively low to moderate O2 sat on 4 L NC  K normal     Will provide some HD and UF to assits with hypoxia and prevent mechanical intubation (2 hours)  But would not dialyze intensely in the period post MI to assist with coronary perfusion     NSTEMI (non-ST elevated myocardial infarction)  Reviewed the chart  H/o of CAD, past MI, diasttolic CHF  Discussed with ER  Agree with current mgmt     Acute hypoxemic respiratory failure  CXR reviewed  Mild respiratory acidosis on ABG  As addressed above     DM (diabetes mellitus)  Reviewed the chart        Plans and recommendations:  As discussed above  Total time spent 70 minutes including time needed to review the records, the   patient evaluation, documentation, face-to-face discussion with the patient,   more than 50% of the time was spent on coordination of care and counseling.

## 2022-03-16 NOTE — ASSESSMENT & PLAN NOTE
Patient with Hypoxic Respiratory failure which is Acute.  she is not on home oxygen. Supplemental oxygen was provided and noted-  .   Signs/symptoms of respiratory failure include- increased work of breathing. Contributing diagnoses includes - CHF /Pulmonary edema Labs and images were reviewed. Patient Has recent ABG, which has been reviewed. Will treat underlying causes and adjust management of respiratory failure     3/16/22: Improved after HD- weaned to 2 liters NC

## 2022-03-16 NOTE — ASSESSMENT & PLAN NOTE
-Troponin 5.194>9.777>13.158  -Patient remains altered on exam  -No documented complaints of chest pain   -Continue OMT-ASA, BB, heparin gtt  -Statin held given bumped LFT's  -Check echo  -Conservative med mgmt for now, will consider ischemic workup pending improvement in mental status

## 2022-03-16 NOTE — ASSESSMENT & PLAN NOTE
Daughter states pt's baseline is AAOx3  Etiology of AMS unclear but likely 2/2 prolonged hypoglycemia, infection, and need for HD  Cont IV abx  Nephrology consulted for HD   Neuro checks  Monitor blood glucose  Check ammonia and TSH     3/16/22: Pt remains drowsy but is easily arousable to AAOx3 status. Continue HD, treatment for UTI with IV Rocephin, monitoring blood sugars, and holding sedating home medications.

## 2022-03-16 NOTE — HPI
HPI obtained from chart as patient lethargic during exam    Stephanie Wilder is a 81 y.o. female with current medical conditions including DM, HTN, ESRD on HD, GERD, breast cancer, TB, cognitive impairment, hx of MRSA bacteremia with mitral vegetation who presented to the ED 1 day ago after being found unresponsive at [facility]. Per EMS report patient was hypoxemic and hypoglycemic with BS in the 40s. Upon arrival to the ED the patients CT head negative for acute abnormalities, CXR with pulmonary edema. EKG with SR, 1st AV block, RBBB, T wave inversions anteriorly. Cardiology consulted, patient seen and examined. Remains lethargic and confused, unresponsive to verbal stimuli. Would occasionally mumble incomprehensible words. Labs reviewed, troponin 5.1 >> 5.6, BNP 4245, WBC 9.77>>13.54, Cr 5.6 >> 4.9, AST//237 and found to have a UTI on UA.  Echo pending. Would recommend continuing conservative cardio management for now given her AMS.

## 2022-03-16 NOTE — PT/OT/SLP EVAL
Physical Therapy Evaluation and Discharge Note    Patient Name:  Stephanie Wilder   MRN:  [id number]    Recommendations:     Discharge Recommendations:  nursing facility, basic   Discharge Equipment Recommendations: none   Barriers to discharge: None    Assessment:     Stephanie Wilder is a 81 y.o. female admitted with a medical diagnosis of Encephalopathy, metabolic. .  At this time, patient is functioning at their prior level of function and does not require further acute PT services.     Recent Surgery: * No surgery found *     Plan:     During this hospitalization, patient does not require further acute PT services.  Please re-consult if situation changes.  D/C PT FROM P.T. SERVICES TO [facility] FOR CONT BED EX. AND TF'S TO TOLERANCE    Subjective     Chief Complaint:   Patient/Family Comments/goals:   Pain/Comfort:  · Pain Rating 1: 0/10    Patients cultural, spiritual, Jewish conflicts given the current situation:      Living Environment:  PT IS A RESIDENT OF THE [facility], PT REQUIRES ASSIST FROM STAFF FOR BED MOBILITY, TF'S, AND ALL ADL'S, PT IS W/C BOUND, REPORTS ABLE TO PERFORM W/C MOBILITY AT NURSING HOME ONLY BECAUSE SHE IS FAMILIAR WITH IT  Prior to admission, patients level of function was ASSIST WITH ALL MOBILITY FROM STAFF.  Equipment used at home: wheelchair.  DME owned (not currently used): none.  Upon discharge, patient will have assistance from STAFF.    Objective:     Communicated with NURSE TAYLOR prior to session.  Patient found supine with telemetry, peripheral IV, bed alarm, oxygen ([employer]) upon PT entry to room.    General Precautions: Standard, fall, vision impaired (BLIND R EYE, VERY LIMITED VISION TO L EYE)   Orthopedic Precautions:N/A   Braces: N/A   Respiratory Status: Nasal cannula, flow 3 L/min    Exams:  · Cognitive Exam:  Patient is oriented to Person, Place and PT ABLE TO FOLLOW COMMANDS AND ANSWER MOST QUESTIONS BUT APPEARS CONFUSED AT TIMES  · Postural Exam:   Patient presented with the following abnormalities:    · -       Rounded shoulders  · -       Forward head  · Sensation:    · -       Intact  · Skin Integrity/Edema:      · -       Edema: Moderate BLE  · RLE ROM: WFL  · RLE Strength: GROSSLY 3/5- AAROM  · LLE ROM: WFL  · LLE Strength: GORSSLY 3/5- ARROM    Functional Mobility:  · Bed Mobility:     · Rolling Left:  moderate assistance  · Scooting: moderate assistance  · Supine to Sit: moderate assistance and of 2 persons  · Transfers:     · Sit to Stand:  moderate assistance and of 2 persons with no AD  · Bed to Chair: moderate assistance and of 2 persons with  no AD  using  Step Transfer  · Balance: POOR    AM-PAC 6 CLICK MOBILITY  Total Score:10     Therapeutic Activities and Exercises:   PT EDUCATED IN ROLE OF P.T., PT SLOW MOVING WITH MOBILITY, CUES TO STAY ON TASK, ENCOURAGEMENT GIVEN AS NEEDED, VISUAL CUES GIVEN AS NEEDED FOR BED MOBILITY AND TF'S, PT ENCOURAGED TO INCREASE TIME OOB IN CHAIR, ENCOURAGED TO UTILIZE STAFF TO ASSIST IN BED<>CHAIR TF'S THROUGHOUT THE DAY    AM-PAC 6 CLICK MOBILITY  Total Score:10     Patient left up in chair with all lines intact, call button in reach, chair alarm on, NURSE notified and NURSE present.    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

## 2022-03-16 NOTE — SUBJECTIVE & OBJECTIVE
Interval History: Pt was seen and examined. Pt does wake up and respond to questions with strong encouragement. Labs and meds reviewed. Discussed with other providers. S/p HD last pm. Tolerated HD well, 3 L removed.    Review of patient's allergies indicates:  No Known Allergies  Current Facility-Administered Medications   Medication Frequency    acetaminophen tablet 650 mg Q4H PRN    albuterol-ipratropium 2.5 mg-0.5 mg/3 mL nebulizer solution 3 mL Q6H PRN    albuterol-ipratropium 2.5 mg-0.5 mg/3 mL nebulizer solution 3 mL Q6H    aluminum-magnesium hydroxide-simethicone 200-200-20 mg/5 mL suspension 30 mL QID PRN    aspirin chewable tablet 81 mg Daily    budesonide nebulizer solution 0.5 mg Q12H    carvediloL tablet 3.125 mg BID WM    cefTRIAXone (ROCEPHIN) 1 g/50 mL D5W IVPB Q24H    dextrose 10% bolus 125 mL PRN    dextrose 10% bolus 250 mL PRN    dextrose 5 % infusion Continuous    docusate sodium capsule 100 mg Daily    gabapentin capsule 100 mg QHS    glucagon (human recombinant) injection 1 mg PRN    glucose chewable tablet 16 g PRN    glucose chewable tablet 24 g PRN    heparin 25,000 units in dextrose 5% (100 units/ml) IV bolus from bag - ADDITIONAL PRN BOLUS - 30 units/kg (max bolus 4000 units) PRN    heparin 25,000 units in dextrose 5% (100 units/ml) IV bolus from bag - ADDITIONAL PRN BOLUS - 60 units/kg (max bolus 4000 units) PRN    heparin 25,000 units in dextrose 5% 250 mL (100 units/mL) infusion LOW INTENSITY nomogram - [facility] Continuous    hydrALAZINE injection 10 mg Q6H PRN    insulin aspart U-100 pen 0-5 Units QID (AC + HS) PRN    magnesium oxide tablet 800 mg PRN    magnesium oxide tablet 800 mg PRN    melatonin tablet 6 mg Nightly PRN    metronidazole 1% gel BID    miconazole nitrate 2% ointment BID    naloxone 0.4 mg/mL injection 0.02 mg PRN    ondansetron injection 4 mg Q6H PRN    pantoprazole EC tablet 40 mg BID    polyethylene glycol packet 17 g Daily    potassium bicarbonate disintegrating tablet  35 mEq PRN    potassium bicarbonate disintegrating tablet 50 mEq PRN    potassium bicarbonate disintegrating tablet 60 mEq PRN    potassium, sodium phosphates 280-160-250 mg packet 2 packet PRN    potassium, sodium phosphates 280-160-250 mg packet 2 packet PRN    potassium, sodium phosphates 280-160-250 mg packet 2 packet PRN    prochlorperazine injection Soln 5 mg Q6H PRN    simethicone chewable tablet 80 mg QID PRN    sodium chloride 0.9% flush 10 mL Q8H PRN    traMADoL tablet 50 mg Q12H PRN       Objective:     Vital Signs (Most Recent):  Temp: 98.6 °F (37 °C) (03/16/22 1128)  Pulse: 70 (03/16/22 1128)  Resp: 18 (03/16/22 1128)  BP: 121/88 (03/16/22 1128)  SpO2: 99 % (03/16/22 1128)  O2 Device (Oxygen Therapy): nasal cannula (03/16/22 0751) Vital Signs (24h Range):  Temp:  [96.8 °F (36 °C)-98.8 °F (37.1 °C)] 98.6 °F (37 °C)  Pulse:  [70-86] 70  Resp:  [9-23] 18  SpO2:  [87 %-99 %] 99 %  BP: ()/(49-88) 121/88     Weight: 64 kg (141 lb) (03/16/22 0749)  Body mass index is 21.44 kg/m².  Body surface area is 1.75 meters squared.    I/O last 3 completed shifts:  In: 116.9 [I.V.:66.9; IV Piggyback:50]  Out: 0     Physical Exam  Vitals and nursing note reviewed.   Constitutional:       Appearance: Normal appearance. She is ill-appearing.   HENT:      Head: Normocephalic and atraumatic.   Cardiovascular:      Rate and Rhythm: Normal rate and regular rhythm.      Heart sounds:     No friction rub.   Pulmonary:      Effort: Pulmonary effort is normal.      Breath sounds: Normal breath sounds. No rales.   Abdominal:      Palpations: Abdomen is soft.      Tenderness: There is no abdominal tenderness.   Musculoskeletal:      Right lower leg: Edema present.      Left lower leg: Edema present.   Neurological:      General: No focal deficit present.      Mental Status: She is alert and oriented to person, place, and time.   Psychiatric:         Mood and Affect: Mood normal.       Significant Labs: reviewed  BMP  Lab  Results   Component Value Date     03/16/2022    K 4.3 03/16/2022     03/16/2022    CO2 23 03/16/2022    BUN 28 (H) 03/16/2022    CREATININE 4.9 (H) 03/16/2022    CALCIUM 8.3 (L) 03/16/2022    ANIONGAP 14 03/16/2022    ESTGFRAFRICA 9 (A) 03/16/2022    EGFRNONAA 8 (A) 03/16/2022     Lab Results   Component Value Date    WBC 8.01 03/16/2022    HGB 10.3 (L) 03/16/2022    HCT 34.2 (L) 03/16/2022     (H) 03/16/2022    PLT 91 (L) 03/16/2022         Significant Imaging: reviewed

## 2022-03-16 NOTE — ASSESSMENT & PLAN NOTE
CBG was 143 on arrival   Pt has AMS and can not eat  Glucose 121 at present   Will start D5W at 20ml/hour     3/16/22: Blood sugars low 100s- cont D5W infusion

## 2022-03-16 NOTE — PLAN OF CARE
Recommendation/Intervention: 3/16  1. Recommend to continue patient on a low sodium/diabetic/mech soft 1500mL fluid restriction diet.   2. Recommend to consider Novasource Renal supplement TID with meals for increased calorie and protein intake.   -Provides 1425 calories and 64g protein.   3. Weekly weights per RD follow up.    Rosa Maria Devries RD,LDN

## 2022-03-16 NOTE — ED NOTES
Pt repositioned, diaper dry, pt opens eyes occ, left eye, responds to verbal , doesn't follow commands  Skin w/d hr reg, monitor cont to show sr w/ 1 deg av block, bbb, no ectopy  sr up x 2 , door and blinds open to observe pt

## 2022-03-16 NOTE — PLAN OF CARE
OT Eval completed. Pt at [facility] and does not require skilled OT services at this time. Pt is discharged to [facility] for continued maintenance program.

## 2022-03-16 NOTE — ASSESSMENT & PLAN NOTE
Serial troponin   IV heparin, ASA, BB  No statin 2/2 elevated LFTs  Consult Cardiology   Echo     3/16/22: Cardiology evlauated pt and recommended medical management for now- cont IV heparin, BB, ASA

## 2022-03-16 NOTE — PROGRESS NOTES
[facility])  Nephrology  Progress Note    Patient Name: Stephanie Wilder  MRN: [id number]  Admission Date: 3/15/2022  Hospital Length of Stay: 0 days  Attending Provider: Romelia Brunner MD   Primary Care Physician: Garry Vazquez MD  Principal Problem:Encephalopathy, metabolic    Subjective:     HPI: Pt was seen and examined. Labs and meds reviewed. Discussed with other providers. Chart was reviewed. Pt is a81 y/o female with ESRD on chronic HD at [facility] q MWF who presented with SOB and was found to have NSTEMI (troponin 5). Pt is compliant with HD, but is always fluid overloaded due to XS fluid intake and low intolerance for adequate UF. Pt currently has no CP, denies SOB, but has relatively low O2 sat on 4 L NC.       Interval History: Pt was seen and examined. Pt does wake up and respond to questions with strong encouragement. Labs and meds reviewed. Discussed with other providers. S/p HD last pm. Tolerated HD well, 3 L removed.    Review of patient's allergies indicates:  No Known Allergies  Current Facility-Administered Medications   Medication Frequency    acetaminophen tablet 650 mg Q4H PRN    albuterol-ipratropium 2.5 mg-0.5 mg/3 mL nebulizer solution 3 mL Q6H PRN    albuterol-ipratropium 2.5 mg-0.5 mg/3 mL nebulizer solution 3 mL Q6H    aluminum-magnesium hydroxide-simethicone 200-200-20 mg/5 mL suspension 30 mL QID PRN    aspirin chewable tablet 81 mg Daily    budesonide nebulizer solution 0.5 mg Q12H    carvediloL tablet 3.125 mg BID WM    cefTRIAXone (ROCEPHIN) 1 g/50 mL D5W IVPB Q24H    dextrose 10% bolus 125 mL PRN    dextrose 10% bolus 250 mL PRN    dextrose 5 % infusion Continuous    docusate sodium capsule 100 mg Daily    gabapentin capsule 100 mg QHS    glucagon (human recombinant) injection 1 mg PRN    glucose chewable tablet 16 g PRN    glucose chewable tablet 24 g PRN    heparin 25,000 units in dextrose 5% (100 units/ml) IV bolus from bag - ADDITIONAL PRN  BOLUS - 30 units/kg (max bolus 4000 units) PRN    heparin 25,000 units in dextrose 5% (100 units/ml) IV bolus from bag - ADDITIONAL PRN BOLUS - 60 units/kg (max bolus 4000 units) PRN    heparin 25,000 units in dextrose 5% 250 mL (100 units/mL) infusion LOW INTENSITY nomogram - [facility] Continuous    hydrALAZINE injection 10 mg Q6H PRN    insulin aspart U-100 pen 0-5 Units QID (AC + HS) PRN    magnesium oxide tablet 800 mg PRN    magnesium oxide tablet 800 mg PRN    melatonin tablet 6 mg Nightly PRN    metronidazole 1% gel BID    miconazole nitrate 2% ointment BID    naloxone 0.4 mg/mL injection 0.02 mg PRN    ondansetron injection 4 mg Q6H PRN    pantoprazole EC tablet 40 mg BID    polyethylene glycol packet 17 g Daily    potassium bicarbonate disintegrating tablet 35 mEq PRN    potassium bicarbonate disintegrating tablet 50 mEq PRN    potassium bicarbonate disintegrating tablet 60 mEq PRN    potassium, sodium phosphates 280-160-250 mg packet 2 packet PRN    potassium, sodium phosphates 280-160-250 mg packet 2 packet PRN    potassium, sodium phosphates 280-160-250 mg packet 2 packet PRN    prochlorperazine injection Soln 5 mg Q6H PRN    simethicone chewable tablet 80 mg QID PRN    sodium chloride 0.9% flush 10 mL Q8H PRN    traMADoL tablet 50 mg Q12H PRN       Objective:     Vital Signs (Most Recent):  Temp: 98.6 °F (37 °C) (03/16/22 1128)  Pulse: 70 (03/16/22 1128)  Resp: 18 (03/16/22 1128)  BP: 121/88 (03/16/22 1128)  SpO2: 99 % (03/16/22 1128)  O2 Device (Oxygen Therapy): nasal cannula (03/16/22 0751) Vital Signs (24h Range):  Temp:  [96.8 °F (36 °C)-98.8 °F (37.1 °C)] 98.6 °F (37 °C)  Pulse:  [70-86] 70  Resp:  [9-23] 18  SpO2:  [87 %-99 %] 99 %  BP: ()/(49-88) 121/88     Weight: 64 kg (141 lb) (03/16/22 0749)  Body mass index is 21.44 kg/m².  Body surface area is 1.75 meters squared.    I/O last 3 completed shifts:  In: 116.9 [I.V.:66.9; IV Piggyback:50]  Out: 0     Physical Exam  Vitals  and nursing note reviewed.   Constitutional:       Appearance: Normal appearance. She is ill-appearing.   HENT:      Head: Normocephalic and atraumatic.   Cardiovascular:      Rate and Rhythm: Normal rate and regular rhythm.      Heart sounds:     No friction rub.   Pulmonary:      Effort: Pulmonary effort is normal.      Breath sounds: Normal breath sounds. No rales.   Abdominal:      Palpations: Abdomen is soft.      Tenderness: There is no abdominal tenderness.   Musculoskeletal:      Right lower leg: Edema present.      Left lower leg: Edema present.   Neurological:      General: No focal deficit present.      Mental Status: She is alert and oriented to person, place, and time.   Psychiatric:         Mood and Affect: Mood normal.       Significant Labs: reviewed  [facility]  Lab Results   Component Value Date     03/16/2022    K 4.3 03/16/2022     03/16/2022    CO2 23 03/16/2022    BUN 28 (H) 03/16/2022    CREATININE 4.9 (H) 03/16/2022    CALCIUM 8.3 (L) 03/16/2022    ANIONGAP 14 03/16/2022    ESTGFRAFRICA 9 (A) 03/16/2022    EGFRNONAA 8 (A) 03/16/2022     Lab Results   Component Value Date    WBC 8.01 03/16/2022    HGB 10.3 (L) 03/16/2022    HCT 34.2 (L) 03/16/2022     (H) 03/16/2022    PLT 91 (L) 03/16/2022         Significant Imaging: reviewed    Assessment/Plan:     82 y/o female with ESRD on chronic HD presented with SOB and NSTEMI:     End stage renal disease  ESRD on chronic HD q MWF at [facility].  S/p HD late yesterday, tolerated HD well. 3 L removed  Dialysis was done only partially in order to remove enough fluid to prevent respiratory worsening but also to provide coronary perfusion in light of the acute MI  Pulmonary edema improved  K normal  O2 sat improved with HD  No acute indications for HD today  Acute MI, will hold HD today     Fluid gain not new, always fluid overloaded due to XS fluid intake and low tolerance for adequate UF.     NSTEMI (non-ST elevated myocardial  infarction)  H/o of CAD, past MI, diasttolic CHF  Expect has extensive, diffuse atherosclerosis   Is on ASA, beta blockers, and IV heparin  Discussed with primary team  Reviewed cardiology note  Will defer decision making     Acute hypoxemic respiratory failure  Presented with hypoxia and pulm edema  Improved with UF/HD  Respiratory acidosis on ABG     DM (diabetes mellitus)  Reviewed the chart        Plans and recommendations:  As discussed above  Total time spent 35 minutes including time needed to review the records, the   patient evaluation, documentation, face-to-face discussion with the patient,   more than 50% of the time was spent on coordination of care and counseling.          Blayne Jang MD  Nephrology  [facility] - Telemetry ([facility])

## 2022-03-16 NOTE — PLAN OF CARE
Report received from ED. Pt transferred to telemetry room 239. Pt awakens to voice; disoriented to time. Slurred, sometimes incomprehensible, speech. NSR on the heart monitor. On 4L NC; tolerating well. Pt turned and repositioned with use of pillows. 20 G PIV in L AC CDI with no redness, swelling, warmth, or drainage. Unable to initiate D5 at this time due to poor IV access; MD notified. Blood sugar monitored. Diabetes education provided. Bed low, wheels locked, alarms audible. Plan of care reviewed. Vital signs monitored. Pain assessed. Safety promoted. Infection risks reduced.

## 2022-03-16 NOTE — CONSULTS
[facility] - [facility] ([facility])  Adult Nutrition  Consult Note    SUMMARY     Recommendations    Recommendation/Intervention:   1. Recommend to continue patient on a low sodium/diabetic/mech soft 1500mL fluid restriction diet.   2. Recommend to consider Novasource Renal supplement TID with meals for increased calorie and protein intake.   -Provides 1425 calories and 64g protein.   3. Weekly weights per RD follow up.    Goals:   1. Patient will increase PO intake to meet >50% of estimated energy needs by RD follow up.    Nutrition Goal Status: new    Communication of RD Recs:  (Plan of care;Sticky Note)    Assessment and Plan    Nutrition Problem  Inadequate oral intake     Related to (etiology):   Decreased PO    Signs and Symptoms (as evidenced by):   ESRD, 0% PO intake, dialysis, Ill appearing      Interventions/Recommendations (treatment strategy):  Continue current diet   Novasource Renal TID with meals   Collaboration with Medical Providers   Weekly Weights     Nutrition Diagnosis Status:   New         Malnutrition Assessment         Reason for Assessment    Reason For Assessment: consult  Diagnosis:  (encephalopathy, metabolic)  Relevant Medical History: DM, HTN, ESRD, gerd, anemia, dysphagia, dementia  General Information Comments:     3/16:RD consulted for malnutrition. Patient is on a low sodium, diabetic, mech soft diet with 1500mL fluid restriction. Patient has poor PO intake. Patient is ill appearing, lethargic, disoriented. Patient receives dialysis on MWF. Per note patient is always in fluid overload due to patient intaking excess fluids. Patients last BM is unknown. Patient has 3+ moderate edema to ankles, legs, and feet. Patient was sleeping. NFPE not performed due to dementia and patient not waking up. Will continue to monitor.    Nutrition Discharge Planning: low sodium/diabetic/mech soft, fluid restriction    Nutrition Risk Screen    Nutrition Risk Screen: dysphagia or difficulty  "swallowing    Nutrition/Diet History    Patient Reported Diet/Restrictions/Preferences:  (unknown)  Spiritual, Cultural Beliefs, Baptism Practices, Values that Affect Care: no  Food Allergies: NKFA  Factors Affecting Nutritional Intake: impaired cognitive status/motor control, decreased appetite    Anthropometrics    Temp: 98.6 °F (37 °C)  Height: 5' 8" (172.7 cm)  Height (inches): 68 in  Weight Method: Bed Scale  Weight: 63.9 kg (140 lb 14 oz)  Weight (lb): 140.88 lb  Ideal Body Weight (IBW), Female: 140 lb  % Ideal Body Weight, Female (lb): 100.63 %  BMI (Calculated): 21.4  BMI Grade: 18.5-24.9 - normal       Lab/Procedures/Meds  BMP  Lab Results   Component Value Date     03/16/2022    K 4.3 03/16/2022     03/16/2022    CO2 23 03/16/2022    BUN 28 (H) 03/16/2022    CREATININE 4.9 (H) 03/16/2022    CALCIUM 8.3 (L) 03/16/2022    ANIONGAP 14 03/16/2022    ESTGFRAFRICA 9 (A) 03/16/2022    EGFRNONAA 8 (A) 03/16/2022     Lab Results   Component Value Date     03/16/2022    K 4.3 03/16/2022     03/16/2022    CO2 23 03/16/2022     Lab Results   Component Value Date    CALCIUM 8.3 (L) 03/16/2022    PHOS 5.3 (H) 03/15/2022     Lab Results   Component Value Date    LABPROT 15.6 (H) 03/15/2022    ALBUMIN 3.0 (L) 03/16/2022     Pertinent Labs Reviewed: reviewed  Pertinent Medications Reviewed: reviewed  Scheduled Meds:   albuterol-ipratropium  3 mL Nebulization Q6H    aspirin  81 mg Oral Daily    budesonide  0.5 mg Nebulization Q12H    carvediloL  3.125 mg Oral BID WM    cefTRIAXone (ROCEPHIN) IVPB  1 g Intravenous Q24H    docusate sodium  100 mg Oral Daily    gabapentin  100 mg Oral QHS    metronidazole 1%   Topical (Top) BID    miconazole nitrate 2%   Topical (Top) BID    pantoprazole  40 mg Oral BID    polyethylene glycol  17 g Oral Daily     Continuous Infusions:   dextrose 5 % 20 mL/hr at 03/16/22 0023    heparin (porcine) in D5W 14 Units/kg/hr (03/16/22 1334)     PRN " Meds:.acetaminophen, albuterol-ipratropium, aluminum-magnesium hydroxide-simethicone, dextrose 10%, dextrose 10%, glucagon (human recombinant), glucose, glucose, heparin (PORCINE), heparin (PORCINE), hydrALAZINE, insulin aspart U-100, magnesium oxide, magnesium oxide, melatonin, naloxone, ondansetron, potassium bicarbonate, potassium bicarbonate, potassium bicarbonate, potassium, sodium phosphates, potassium, sodium phosphates, potassium, sodium phosphates, prochlorperazine, simethicone, sodium chloride 0.9%, traMADoL    Physical Findings/Assessment         Estimated/Assessed Needs    Weight Used For Calorie Calculations: 63.9 kg (140 lb 14 oz)  Energy Calorie Requirements (kcal): 1394 (mifflin x 1.2AF)  Energy Need Method: [place]-St Jeor  Protein Requirements: 64-76 (1.0-1.2g/kg, HD)  Weight Used For Protein Calculations: 63.9 kg (140 lb 14 oz)  Fluid Requirements (mL): 1394  Estimated Fluid Requirement Method: RDA Method  RDA Method (mL): 1394  CHO Requirement: 174      Nutrition Prescription Ordered    Current Diet Order: Low Sodium/Diabetic/Mech soft/1500mL fluid restriction    Evaluation of Received Nutrient/Fluid Intake    % Kcal Needs: 0%  % Protein Needs: 0%  Energy Calories Required: not meeting needs  Protein Required: not meeting needs  Fluid Required: not meeting needs  Tolerance:  (Patient not consuming PO)  % Intake of Estimated Energy Needs: 0 - 25 %  % Meal Intake: 0 - 25 %    Nutrition Risk    Level of Risk/Frequency of Follow-up: moderate - high       Monitor and Evaluation    Food and Nutrient Intake: energy intake, food and beverage intake  Food and Nutrient Adminstration: diet order  Knowledge/Beliefs/Attitudes: food and nutrition knowledge/skill, beliefs and attitudes  Physical Activity and Function: nutrition-related ADLs and IADLs  Anthropometric Measurements: weight, weight change, body mass index  Biochemical Data, Medical Tests and Procedures: electrolyte and renal panel, gastrointestinal  profile, glucose/endocrine profile, inflammatory profile, lipid profile  Nutrition-Focused Physical Findings: overall appearance       Nutrition Follow-Up    RD Follow-up?: Yes   Rosa Maria Devries RD,DEVYNN

## 2022-03-16 NOTE — ACP (ADVANCE CARE PLANNING)
Advance Care Planning     Date: 03/16/2022    Today a meeting took place: other: telephone     Patient Participation: Patient is unable to participate     Attendees (Name and  Relationship to patient): Health care power of : Pt's daughter and POAJoe    Staff attendees (Name and  Role): Gayatri Bennett NP    ACP Conversation (General): Understanding of advance care planning and role of health care agent defined Code status and POA  Understanding of current condition AMS, NSTEMI, ESRD, UTI, CHF.     Code Status: Full Code     ACP Documents: None    Goals of care: The family endorses that what is most important right now is to focus on remaining as independent as possible and extending life as long as possible, even it it means sacrificing quality    Accordingly, we have decided that the best plan to meet the patient's goals includes continuing with treatment      Recommendations/  Follow-up tasks: The patient and health care agent were provided the following recommendations consider DNR status       Length of ACP   conversation in minutes: 40 minutes

## 2022-03-16 NOTE — CONSULTS
[facility] (H[facility])  Cardiology  Consult Note    Patient Name: Stephanie Wilder  MRN: [id number]  Admission Date: 3/15/2022  Hospital Length of Stay: 0 days  Code Status: Full Code   Attending Provider: Romelia Brunner MD   Consulting Provider: Kourtney Morales PA-C  Primary Care Physician: Garry Vazquez MD  Principal Problem:Encephalopathy, metabolic    Patient information was obtained from past medical records and ER records.     Inpatient consult to Cardiology  Consult performed by: Kourtney Morales PA-C  Consult ordered by: Hui Cardona MD        Subjective:     Chief Complaint:  AMS    HPI:   HPI obtained from chart as patient lethargic during exam    Stephanie Wilder is a 81 y.o. female with current medical conditions including DM, HTN, ESRD on HD, GERD, breast cancer, TB, cognitive impairment, hx of MRSA bacteremia with mitral vegetation who presented to the ED 1 day ago after being found unresponsive at [facility]. Per EMS report patient was hypoxemic and hypoglycemic with BS in the 40s. Upon arrival to the ED the patients CT head negative for acute abnormalities, CXR with pulmonary edema. EKG with SR, 1st AV block, RBBB, T wave inversions anteriorly. Cardiology consulted, patient seen and examined. Remains lethargic and confused, unresponsive to verbal stimuli. Would occasionally mumble incomprehensible words. Labs reviewed, troponin 5.1 >> 5.6, BNP 4245, WBC 9.77>>13.54, Cr 5.6 >> 4.9, AST//237 and found to have a UTI on UA.  Echo pending. Would recommend continuing conservative cardio management for now given her AMS.        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     Current Outpatient Medications on File Prior to Encounter   Medication Sig    acetaminophen (TYLENOL) 325 MG tablet Take 650 mg by mouth every 8 (eight) hours.    amLODIPine (NORVASC) 10 MG tablet Take 1 tablet (10 mg total) by mouth once daily.    benzonatate (TESSALON) 100 MG capsule Take 100 mg by mouth 3 (three) times daily as needed for Cough.    calcium carbonate (OS-APARNA) 500 mg calcium (1,250 mg) chewable tablet Take 2 tablets by mouth once daily.    carvediloL (COREG) 25 MG tablet Take 1 tablet (25 mg total) by mouth 2 (two) times daily with meals. (Patient taking differently: Take 25 mg by mouth 2 (two) times daily with meals. (on Monday, Wednesday, & Friday))    cetirizine (ZYRTEC) 10 MG tablet Take 10 mg by mouth once daily.    cholecalciferol, vitamin D3, (VITAMIN D3) 25 mcg (1,000 unit) capsule Take 1 capsule by mouth once daily.    cloNIDine (CATAPRES) 0.1 MG tablet Take 0.1 mg by mouth every 8 (eight) hours as needed (hypertension).    docusate sodium (COLACE) 100 MG capsule Take 100 mg by mouth once daily. (on Tuesday, Thursday, Saturday, & Sunday)    doxycycline (VIBRAMYCIN) 100 MG Cap Take 100 mg by mouth once daily.    ethyl chloride 100% 100 % spray Apply topically as needed (to access cannulation once daily on Monday, Wednesday, & Friday).    ferric citrate (AURYXIA ORAL) Take 1 tablet by mouth 2 (two) times daily with meals.    gabapentin (NEURONTIN) 100 MG capsule Take 100 mg by mouth every evening.    hydrocortisone (ANUSOL-HC) 2.5 % rectal cream Apply to hemorrhoids every 12 hours    hydrOXYzine pamoate (VISTARIL) 25 MG Cap Take 25 mg by mouth every 6 (six) hours as needed (allergies).    insulin aspart U-100 (NOVOLOG) 100 unit/mL  injection Inject into the skin 3 (three) times daily before meals. using sliding scale    melatonin 5 mg Cap Take 1 capsule by mouth nightly.    mirtazapine (REMERON SOL-TAB) 15 MG disintegrating tablet Take 15 mg by mouth every evening.    pantoprazole (PROTONIX) 40 MG tablet Take 1 tablet (40 mg total) by mouth 2 (two) times daily.    polyethylene glycol (GLYCOLAX) 17 gram PwPk Take 17 g by mouth once daily.    polyvinyl alcohol-povidone (ARTIFICIAL TEARS,PVALCH-POVID,) 0.5-0.6 % Drop Place 1 drop into both eyes every 12 (twelve) hours as needed (dry eyes).    pravastatin (PRAVACHOL) 40 MG tablet Take 1 tablet (40 mg total) by mouth once daily.    psyllium (METAMUCIL) powder Take 1 packet by mouth once daily.    traMADol (ULTRAM) 50 mg tablet Take 50 mg by mouth every 12 (twelve) hours as needed for Pain.     Family History       Problem Relation (Age of Onset)    Diabetes Maternal Aunt, Paternal Aunt, Child    Glaucoma Maternal Aunt, Paternal Aunt    Heart attack Maternal Aunt (80), Brother (79)          Tobacco Use    Smoking status: Never Smoker    Smokeless tobacco: Never Used   Substance and Sexual Activity    Alcohol use: No    Drug use: No    Sexual activity: Not on file     Review of Systems   Unable to perform ROS: Mental status change   Objective:     Vital Signs (Most Recent):  Temp: 98.8 °F (37.1 °C) (03/16/22 0749)  Pulse: 75 (03/16/22 0751)  Resp: 17 (03/16/22 0751)  BP: (!) 143/65 (03/16/22 0749)  SpO2: 95 % (03/16/22 0751)   Vital Signs (24h Range):  Temp:  [96.8 °F (36 °C)-98.8 °F (37.1 °C)] 98.8 °F (37.1 °C)  Pulse:  [72-86] 75  Resp:  [9-24] 17  SpO2:  [87 %-100 %] 95 %  BP: ()/(33-82) 143/65     Weight: 64 kg (141 lb)  Body mass index is 21.44 kg/m².    SpO2: 95 %  O2 Device (Oxygen Therapy): nasal cannula      Intake/Output Summary (Last 24 hours) at 3/16/2022 1101  Last data filed at 3/16/2022 0532  Gross per 24 hour   Intake 116.93 ml   Output 0 ml   Net 116.93 ml        Lines/Drains/Airways       Central Venous Catheter Line  Duration                  Hemodialysis AV Graft Right thigh -- days              Drain  Duration                  Hemodialysis AV Fistula Right thigh -- days              Peripheral Intravenous Line  Duration                  Peripheral IV - Single Lumen 03/15/22 20 G Left Antecubital 1 day         Peripheral IV - Single Lumen 03/15/22 1920 20 G Right Antecubital <1 day                    Physical Exam  Vitals and nursing note reviewed.   Constitutional:       General: She is not in acute distress.     Appearance: She is well-developed. She is ill-appearing. She is not diaphoretic.      Comments: Lethargic   HENT:      Head: Normocephalic and atraumatic.   Eyes:      General:         Right eye: No discharge.         Left eye: No discharge.      Pupils: Pupils are equal, round, and reactive to light.   Neck:      Thyroid: No thyromegaly.      Vascular: No JVD.      Trachea: No tracheal deviation.      Comments: +JVD  Cardiovascular:      Rate and Rhythm: Normal rate and regular rhythm.      Heart sounds: Normal heart sounds, S1 normal and S2 normal. No murmur heard.  Pulmonary:      Effort: Pulmonary effort is normal. No respiratory distress.      Breath sounds: No wheezing.      Comments: Diminished BS at bases  Abdominal:      General: There is no distension.      Tenderness: There is no rebound.   Musculoskeletal:      Cervical back: Neck supple.      Right lower leg: Edema present.      Left lower leg: Edema present.   Skin:     General: Skin is warm and dry.      Findings: No erythema.      Comments: Dry, skin BLE   Neurological:      Mental Status: She is alert.      Comments: Lethargic, did not respond to verbal stimuli, would occasionally moan or mumble       Significant Labs: CMP   Recent Labs   Lab 03/15/22  1304 03/16/22  0539    137   K 4.7 4.3    100   CO2 22* 23   * 84   BUN 30* 28*   CREATININE 5.6* 4.9*   CALCIUM 8.5*  8.3*   PROT 7.6 7.4   ALBUMIN 3.1* 3.0*   BILITOT 0.8 0.6   ALKPHOS 165* 159*   * 296*   ALT 87* 237*   ANIONGAP 15 14   ESTGFRAFRICA 8* 9*   EGFRNONAA 7* 8*   , CBC   Recent Labs   Lab 03/15/22  1430 03/16/22  0539   WBC 13.54* 8.01   HGB 10.8* 10.3*   HCT 36.5* 34.2*   PLT 83* 91*   , Troponin   Recent Labs   Lab 03/15/22  1304 03/15/22  1854 03/16/22  0539   TROPONINI 5.194* 9.777* 13.158*   , and All pertinent lab results from the last 24 hours have been reviewed.    Significant Imaging: Echocardiogram: Transthoracic echo (TTE) complete ([facility] Only):   Results for orders placed or performed during the hospital encounter of 03/15/22   Echo   Result Value Ref Range    BSA 1.75 m2    TDI SEPTAL 0.06 m/s    LV LATERAL E/E' RATIO 12.00 m/s    LV SEPTAL E/E' RATIO 20.00 m/s    LA WIDTH 3.10 cm    IVC diameter 2.14 cm    Left Ventricular Outflow Tract Mean Velocity 0.94877121724404 cm/s    Left Ventricular Outflow Tract Mean Gradient 2.27 mmHg    TDI LATERAL 0.10 m/s    LVIDd 4.10 3.5 - 6.0 cm    IVS 1.54 (A) 0.6 - 1.1 cm    Posterior Wall 1.77 (A) 0.6 - 1.1 cm    Ao root annulus 3.37 cm    LVIDs 2.76 2.1 - 4.0 cm    FS 33 28 - 44 %    LA volume 35.01 cm3    Sinus 3.17 cm    STJ 3.09 cm    Ascending aorta 3.21 cm    LV mass 281.77 g    LA size 4.03 cm    RVDD 4.34 cm    TAPSE 1.62 cm    Left Ventricle Relative Wall Thickness 0.86 cm    AV mean gradient 6 mmHg    AV valve area 1.91 cm2    AV Velocity Ratio 0.64     AV index (prosthetic) 0.65     MV valve area p 1/2 method 4.57 cm2    E/A ratio 0.90     Mean e' 0.08 m/s    E wave deceleration time 166.676050820901954 msec    IVRT 71.139918427779150 msec    LVOT diameter 1.93 cm    LVOT area 2.9 cm2    LVOT peak nikia 0.96 m/s    LVOT peak VTI 24.00 cm    Ao peak nikia 1.51 m/s    Ao VTI 36.8 cm    RVOT peak nikia 0.38 m/s    RVOT peak VTI 8.2 cm    LVOT stroke volume 70.18 cm3    AV peak gradient 9 mmHg    PV mean gradient 0.36 mmHg    E/E' ratio 15.00 m/s    MV Peak E  Gino 1.20 m/s    TR Max Gino 3.24 m/s    MV stenosis pressure 1/2 time 48.803618093340759 ms    MV Peak A Gino 1.33 m/s    LV Systolic Volume 28.51 mL    LV Systolic Volume Index 16.2 mL/m2    LV Diastolic Volume 74.29 mL    LV Diastolic Volume Index 42.21 mL/m2    LA Volume Index 19.9 mL/m2    LV Mass Index 160 g/m2    Echo EF Estimated 62 %    RA Major Axis 4.34 cm    Left Atrium Minor Axis 3.02 cm    Left Atrium Major Axis 3.63 cm    Triscuspid Valve Regurgitation Peak Gradient 42 mmHg    RA Width 4.39 cm   , EKG: Reviewed, and X-Ray: CXR: X-Ray Chest 1 View (CXR): No results found for this visit on 03/15/22. and X-Ray Chest PA and Lateral (CXR): No results found for this visit on 03/15/22.    Assessment and Plan:   Patient who presents with AMS/encephalopathy and elevated troponin/NSTEMI. Remains altered on exam. Continue OMT for now. Check echo. Will consider ischemic workup if mental status improves however given patient's co-morbidities, med mgmt may be best course of action. Monitor plt count on heparin drip.     * Encephalopathy, metabolic  -Mgmt and workup as per primary team  -Patient noted to be hypotensive, hypoglycemic, and hypoxic by EMS    Acute hypoxemic respiratory failure  -Fluid removal via HD    NSTEMI (non-ST elevated myocardial infarction)  -Troponin 5.194>9.777>13.158  -Patient remains altered on exam  -No documented complaints of chest pain   -Continue OMT-ASA, BB, heparin gtt  -Statin held given bumped LFT's  -Check echo  -Conservative med mgmt for now, will consider ischemic workup pending improvement in mental status    Hypoglycemia  -Mgmt as per primary team    End stage renal disease  -HD as per nephrology        VTE Risk Mitigation (From admission, onward)         Ordered     Reason for No Pharmacological VTE Prophylaxis  Once        Question:  Reasons:  Answer:  IV Heparin w/in 24 hrs. Pre or Post-Op    03/15/22 1734     IP VTE HIGH RISK PATIENT  Once         03/15/22 1734     Place  sequential compression device  Until discontinued         03/15/22 1734     heparin 25,000 units in dextrose 5% (100 units/ml) IV bolus from bag - ADDITIONAL PRN BOLUS - 30 units/kg (max bolus 4000 units)  As needed (PRN)        Question:  Heparin Infusion Adjustment (DO NOT MODIFY ANSWER)  Answer:  [email or web address]\epic\Images\Pharmacy\HeparinInfusions\heparin LOW INTENSITY nomogram for [facility] [id number]    03/15/22 1519     heparin 25,000 units in dextrose 5% (100 units/ml) IV bolus from bag - ADDITIONAL PRN BOLUS - 60 units/kg (max bolus 4000 units)  As needed (PRN)        Question:  Heparin Infusion Adjustment (DO NOT MODIFY ANSWER)  Answer:  [email or web address]\epic\Images\Pharmacy\HeparinInfusions\heparin LOW INTENSITY nomogram for [facility] [id number].pdf    03/15/22 1519     heparin 25,000 units in dextrose 5% 250 mL (100 units/mL) infusion LOW INTENSITY nomogram - [facility]  Continuous        Question Answer Comment   Heparin Infusion Adjustment (DO NOT MODIFY ANSWER) [email or web address]\epic\Images\Pharmacy\HeparinInfusions\heparin LOW INTENSITY nomogram for [facility] [id number].pdf    Begin at (in units/kg/hr) 12        03/15/22 1519                Thank you for your consult. I will follow-up with patient. Please contact us if you have any additional questions.    Kourtney Morales PA-C  Cardiology   [facility] - Tele[facility] ([facility])

## 2022-03-16 NOTE — PROGRESS NOTES
[facility] Medicine  Progress Note    Patient Name: Stephanie Wilder  MRN: [id number]  Patient Class: IP- Inpatient   Admission Date: 3/15/2022  Length of Stay: 0 days  Attending Physician: Romelia Brunner MD  Primary Care Provider: Garry Vazquez MD        Subjective:     Principal Problem:Encephalopathy, metabolic        HPI:   The patient is a 80 yo female with DM, HTN, ESRD on HD, GERD, Bipolar d/o, Breast cancer, Tuberculosis, mild cognitive impairment, Right eye blindness, and hx MRSA bacteremia with mitral vegetation -source thought to be right thigh graft but pt refused removal who presented to ED after being found at [facility] unresponsive, hypoxemic, and hypoglycemic with CBG of 40.  At her nursing home, [facility] placed a non-rebreather mask and gave 2 mg glucagon IM. [facility] then weaned O2 to a nasal cannula and administered 1 amp D5W.  On arrival to ED, pt was on 2 liters NC, CT head showed nothing acute. CXR showed pulmonary edema. EKG showed NSR with 1 st degree AV block, RBB, nonspecific changes. Troponin 5.1. BNP 4295. WBC 13, Mildy elevated LFTs, INR 1.4, +UTI. Glucose 143.    Pt is drowsy, disoriented x 3, arouses to tactile stimulation, and only mumbles incomprehensible words.   Nephrology and cardiology consulted      Overview/Hospital Course:  80 yo female with DM, HTN, ESRD on HD, GERD, Bipolar d/o, Breast cancer, Tuberculosis, mild cognitive impairment, Right eye blindness, and hx MRSA bacteremia with mitral vegetation -source thought to be right thigh graft but pt refused removal who was admitted for AMS, NSTEMI, Hypoglycemia, UTI, Acute hypoxic respiratory failure 2/2 pulmonary edema, and elevated LFTs. She was placed on IV heparin, ASA, BB. Blood sugars improved on arrival. She was placed on D5W at 25ml/hr. Blood sugar stabilized. Nephrology was consulted and pt received urgent HD. Cardiology was consulted    3/16/22: Pt remains drowsy but is easily arousable to AAOx3  status. Oxygen status improved after UF with HD. Oxygen weaned from 4 liters NC to 2 liters NC. Blood sugars improved but remain low 100s on D5W.   Troponin 5.1>9.7>13.1>13.7. Echo showed normal LVEF, Diastolic dysfunction, No WMAs. Cardiology recommended medical management for now. Cont IV Heparin, BB, ASA.   Afebrile, WBC trended down to normal. Blood cultures show NGTD, urine culture pending.       Interval History: see hospital course     Review of Systems   Constitutional:  Positive for activity change, appetite change and fatigue. Negative for chills, diaphoresis, fever and unexpected weight change.   HENT:  Negative for congestion, nosebleeds, sinus pressure and sore throat.    Eyes:  Positive for visual disturbance. Negative for pain and discharge.   Respiratory:  Positive for shortness of breath. Negative for cough, chest tightness, wheezing and stridor.    Cardiovascular:  Negative for chest pain, palpitations and leg swelling.   Gastrointestinal:  Negative for abdominal distention, abdominal pain, blood in stool, constipation, diarrhea, nausea and vomiting.   Endocrine: Negative for cold intolerance and heat intolerance.   Genitourinary:  Negative for difficulty urinating, dysuria, flank pain, frequency and urgency.   Musculoskeletal:  Negative for arthralgias, back pain, joint swelling, myalgias, neck pain and neck stiffness.   Skin:  Negative for rash and wound.   Allergic/Immunologic: Negative for food allergies and immunocompromised state.   Neurological:  Positive for weakness (generalized). Negative for dizziness, seizures, syncope, facial asymmetry, speech difficulty, light-headedness, numbness and headaches.   Hematological:  Negative for adenopathy.   Psychiatric/Behavioral:  Negative for agitation, confusion and hallucinations.    Objective:     Vital Signs (Most Recent):  Temp: 98.6 °F (37 °C) (03/16/22 1128)  Pulse: 67 (03/16/22 1326)  Resp: 18 (03/16/22 1326)  BP: 121/88 (03/16/22  1128)  SpO2: 99 % (03/16/22 1326) Vital Signs (24h Range):  Temp:  [96.8 °F (36 °C)-98.8 °F (37.1 °C)] 98.6 °F (37 °C)  Pulse:  [67-82] 67  Resp:  [9-20] 18  SpO2:  [92 %-99 %] 99 %  BP: (105-193)/(55-88) 121/88     Weight: 63.9 kg (140 lb 14 oz)  Body mass index is 21.42 kg/m².    Intake/Output Summary (Last 24 hours) at 3/16/2022 1550  Last data filed at 3/16/2022 0532  Gross per 24 hour   Intake 66.93 ml   Output 0 ml   Net 66.93 ml      Physical Exam  Vitals and nursing note reviewed.   Constitutional:       General: She is not in acute distress.     Appearance: She is well-developed. She is not diaphoretic.   HENT:      Head: Normocephalic and atraumatic.      Nose: Nose normal.   Eyes:      General: No scleral icterus.     Conjunctiva/sclera: Conjunctivae normal.   Neck:      Vascular: JVD present.      Trachea: No tracheal deviation.   Cardiovascular:      Rate and Rhythm: Normal rate and regular rhythm.      Heart sounds: Normal heart sounds. No murmur heard.    No friction rub. No gallop.   Pulmonary:      Effort: Pulmonary effort is normal. No respiratory distress.      Breath sounds: No stridor. Rales present. No wheezing.   Chest:      Chest wall: No tenderness.   Abdominal:      General: Bowel sounds are normal. There is no distension.      Palpations: Abdomen is soft. There is no mass.      Tenderness: There is no abdominal tenderness. There is no guarding or rebound.   Musculoskeletal:         General: No tenderness or deformity. Normal range of motion.      Cervical back: Normal range of motion and neck supple.      Right lower leg: Edema present.      Left lower leg: Edema present.   Skin:     General: Skin is warm and dry.      Coloration: Skin is not pale.      Findings: No erythema or rash.   Neurological:      Mental Status: She is alert and oriented to person, place, and time.      Cranial Nerves: No cranial nerve deficit.      Motor: No abnormal muscle tone.      Coordination: Coordination  normal.   Psychiatric:         Behavior: Behavior normal.         Thought Content: Thought content normal.       Significant Labs: All pertinent labs within the past 24 hours have been reviewed.    Significant Imaging: I have reviewed all pertinent imaging results/findings within the past 24 hours.      Assessment/Plan:      * Encephalopathy, metabolic  Daughter states pt's baseline is AAOx3  Etiology of AMS unclear but likely 2/2 prolonged hypoglycemia, infection, and need for HD  Cont IV abx  Nephrology consulted for HD   Neuro checks  Monitor blood glucose  Check ammonia and TSH     3/16/22: Pt remains drowsy but is easily arousable to AAOx3 status. Continue HD, treatment for UTI with IV Rocephin, monitoring blood sugars, and holding sedating home medications.     NSTEMI (non-ST elevated myocardial infarction)  Serial troponin   IV heparin, ASA, BB  No statin 2/2 elevated LFTs  Consult Cardiology   Echo     3/16/22: Cardiology evlauated pt and recommended medical management for now- cont IV heparin, BB, ASA    Diastolic CHF, acute on chronic  Patient is identified as having Diastolic (HFpEF) heart failure that is Acute on chronic. CHF is currently uncontrolled due to Rales/crackles on pulmonary exam and Pulmonary edema/pleural effusion on CXR. Latest ECHO performed and demonstrates- No results found for this or any previous visit.  . Continue Beta Blocker and ARNI and monitor clinical status closely. Monitor on telemetry. Patient is on CHF pathway.  Monitor strict Is&Os and daily weights.  Place on fluid restriction of 2 L. Continue to stress to patient importance of self efficacy and  on diet for CHF. Last BNP reviewed- and noted below   Recent Labs   Lab 03/15/22  1304   BNP 4,245*   .  Ultrafiltration with HD    Acute hypoxemic respiratory failure  Patient with Hypoxic Respiratory failure which is Acute.  she is not on home oxygen. Supplemental oxygen was provided and noted-  .   Signs/symptoms of  respiratory failure include- increased work of breathing. Contributing diagnoses includes - CHF /Pulmonary edema Labs and images were reviewed. Patient Has recent ABG, which has been reviewed. Will treat underlying causes and adjust management of respiratory failure     3/16/22: Improved after HD- weaned to 2 liters NC    Hypoglycemia  CBG was 143 on arrival   Pt has AMS and can not eat  Glucose 121 at present   Will start D5W at 20ml/hour     3/16/22: Blood sugars low 100s- cont D5W infusion       Acute cystitis without hematuria  Cont IV Rocephin   Blood cultures show NGTD  Urine culture pending       Elevated LFTs  Viral hepatitis panel pending   Avoid hepatoxic medications   Monitor       Renovascular hypertension  BP variable   Cont BB  Hydralazine prn       CRLD (chronic restrictive lung disease)  ADELIA and ICS neb txs       DM (diabetes mellitus)  NISS      End stage renal disease  Nephrology consulted   received urgent HD on day of admission   Continue scheduled HD        VTE Risk Mitigation (From admission, onward)         Ordered     Reason for No Pharmacological VTE Prophylaxis  Once        Question:  Reasons:  Answer:  IV Heparin w/in 24 hrs. Pre or Post-Op    03/15/22 1734     IP VTE HIGH RISK PATIENT  Once         03/15/22 1734     Place sequential compression device  Until discontinued         03/15/22 1734     heparin 25,000 units in dextrose 5% (100 units/ml) IV bolus from bag - ADDITIONAL PRN BOLUS - 30 units/kg (max bolus 4000 units)  As needed (PRN)        Question:  Heparin Infusion Adjustment (DO NOT MODIFY ANSWER)  Answer:  [email or web address]\[email or web address]\Images\Pharmacy\HeparinInfusions\heparin LOW INTENSITY nomogram for [facility] [id number]    03/15/22 1519     heparin 25,000 units in dextrose 5% (100 units/ml) IV bolus from bag - ADDITIONAL PRN BOLUS - 60 units/kg (max bolus 4000 units)  As needed (PRN)        Question:  Heparin Infusion Adjustment (DO NOT MODIFY ANSWER)  Answer:   [email or web address]\[email or web address]\Images\Pharmacy\HeparinInfusions\heparin LOW INTENSITY nomogram for [facility] [id number]    03/15/22 1519     heparin 25,000 units in dextrose 5% 250 mL (100 units/mL) infusion LOW INTENSITY nomogram - [facility]  Continuous        Question Answer Comment   Heparin Infusion Adjustment (DO NOT MODIFY ANSWER) [email or web address]\epic\Images\Pharmacy\HeparinInfusions\heparin LOW INTENSITY nomogram for [facility] [id number]    Begin at (in units/kg/hr) 12        03/15/22 1519                Discharge Planning   JEFF:      Code Status: Full Code   Is the patient medically ready for discharge?:     Reason for patient still in hospital (select all that apply): Patient trending condition                     Gayatri Bennett NP  Department of Hospital Medicine   [facility] - Telemetry ([facility])

## 2022-03-16 NOTE — HOSPITAL COURSE
82 yo female with DM, HTN, ESRD on HD, GERD, Bipolar d/o, Breast cancer, Tuberculosis, mild cognitive impairment, Right eye blindness, and hx MRSA bacteremia with mitral vegetation -source thought to be right thigh graft but pt refused removal who was admitted for AMS, NSTEMI, Hypoglycemia, UTI, Acute hypoxic respiratory failure 2/2 pulmonary edema, and elevated LFTs. She was placed on IV heparin, ASA, BB. Blood sugars improved on arrival. She was placed on D5W at 25ml/hr. Blood sugar stabilized. Nephrology was consulted and pt received urgent HD. Cardiology was consulted    3/16/22: Pt remains drowsy but is easily arousable to AAOx3 status. Oxygen status improved after UF with HD. Oxygen weaned from 4 liters NC to 2 liters NC. Blood sugars improved but remain low 100s on D5W.   Troponin 5.1>9.7>13.1>13.7. Echo showed normal LVEF, Diastolic dysfunction, No WMAs. Cardiology recommended medical management for now. Cont IV Heparin, BB, ASA.   Afebrile, WBC trended down to normal. Blood cultures show NGTD, urine culture pending.     3/17/22: AAOx3, Back to baseline. Hypoglycemia resolved-D5W discontinued. Oxygenation stable. No chest pain. Urine culture grew multiple organisms- none in predominance. IV Rocephin d/c'd.   Cardiology recommended d/c IV Heparin, add Plavix and Imdur. They also recommended Heme/Onc consult due to thrombocytopenia and need for Plavix. Heme/Onc recommended platelet blue top which showed platelets 81 and peripheral smear. Iron studies, folate pending. Abdominal u/s ordered 2/2 thrombocytopenia and elevated LFTs- r/o Cirrhosis and splenomegaly. Hepatitis panel still pending.   Possible discharge tomorrow after HD to nursing home if heme/onc w/u stable.     3/18/22 - Spoke with Dr. To and he stated platelet count of 92 K adequate for ASA and Plavix dosing. Pt was advised to follow up with [facility] to further evaluate hepatomegaly, transaminitis and thrombocytopenia. Also, pt  advised to follow up with PCP and Dr. Hector, pt's cardiologist. She was seen and examined and determined to be stable for discharge. Her usual home meds were resumed. She was discharged to return to [facility].

## 2022-03-16 NOTE — PLAN OF CARE
Problem: Adult Inpatient Plan of Care  Goal: Plan of Care Review  Outcome: Ongoing, Progressing  Goal: Patient-Specific Goal (Individualized)  Outcome: Ongoing, Progressing  Goal: Absence of Hospital-Acquired Illness or Injury  Outcome: Ongoing, Progressing  Goal: Optimal Comfort and Wellbeing  Outcome: Ongoing, Progressing  Goal: Readiness for Transition of Care  Outcome: Ongoing, Progressing     Problem: Diabetes Comorbidity  Goal: Blood Glucose Level Within Targeted Range  Outcome: Ongoing, Progressing     Problem: Infection  Goal: Absence of Infection Signs and Symptoms  Outcome: Ongoing, Progressing     Problem: Skin Injury Risk Increased  Goal: Skin Health and Integrity  Outcome: Ongoing, Progressing     Problem: Fall Injury Risk  Goal: Absence of Fall and Fall-Related Injury  Outcome: Ongoing, Progressing     Problem: Device-Related Complication Risk (Hemodialysis)  Goal: Safe, Effective Therapy Delivery  Outcome: Ongoing, Progressing     Problem: Hemodynamic Instability (Hemodialysis)  Goal: Effective Tissue Perfusion  Outcome: Ongoing, Progressing     Problem: Infection (Hemodialysis)  Goal: Absence of Infection Signs and Symptoms  Outcome: Ongoing, Progressing

## 2022-03-16 NOTE — ASSESSMENT & PLAN NOTE
-Mgmt and workup as per primary team  -Patient noted to be hypotensive, hypoglycemic, and hypoxic by EMS

## 2022-03-16 NOTE — SUBJECTIVE & OBJECTIVE
Interval History: see hospital course     Review of Systems   Constitutional:  Positive for activity change, appetite change and fatigue. Negative for chills, diaphoresis, fever and unexpected weight change.   HENT:  Negative for congestion, nosebleeds, sinus pressure and sore throat.    Eyes:  Positive for visual disturbance. Negative for pain and discharge.   Respiratory:  Positive for shortness of breath. Negative for cough, chest tightness, wheezing and stridor.    Cardiovascular:  Negative for chest pain, palpitations and leg swelling.   Gastrointestinal:  Negative for abdominal distention, abdominal pain, blood in stool, constipation, diarrhea, nausea and vomiting.   Endocrine: Negative for cold intolerance and heat intolerance.   Genitourinary:  Negative for difficulty urinating, dysuria, flank pain, frequency and urgency.   Musculoskeletal:  Negative for arthralgias, back pain, joint swelling, myalgias, neck pain and neck stiffness.   Skin:  Negative for rash and wound.   Allergic/Immunologic: Negative for food allergies and immunocompromised state.   Neurological:  Positive for weakness (generalized). Negative for dizziness, seizures, syncope, facial asymmetry, speech difficulty, light-headedness, numbness and headaches.   Hematological:  Negative for adenopathy.   Psychiatric/Behavioral:  Negative for agitation, confusion and hallucinations.    Objective:     Vital Signs (Most Recent):  Temp: 98.6 °F (37 °C) (03/16/22 1128)  Pulse: 67 (03/16/22 1326)  Resp: 18 (03/16/22 1326)  BP: 121/88 (03/16/22 1128)  SpO2: 99 % (03/16/22 1326) Vital Signs (24h Range):  Temp:  [96.8 °F (36 °C)-98.8 °F (37.1 °C)] 98.6 °F (37 °C)  Pulse:  [67-82] 67  Resp:  [9-20] 18  SpO2:  [92 %-99 %] 99 %  BP: (105-193)/(55-88) 121/88     Weight: 63.9 kg (140 lb 14 oz)  Body mass index is 21.42 kg/m².    Intake/Output Summary (Last 24 hours) at 3/16/2022 1550  Last data filed at 3/16/2022 0532  Gross per 24 hour   Intake 66.93 ml    Output 0 ml   Net 66.93 ml      Physical Exam  Vitals and nursing note reviewed.   Constitutional:       General: She is not in acute distress.     Appearance: She is well-developed. She is not diaphoretic.   HENT:      Head: Normocephalic and atraumatic.      Nose: Nose normal.   Eyes:      General: No scleral icterus.     Conjunctiva/sclera: Conjunctivae normal.   Neck:      Vascular: JVD present.      Trachea: No tracheal deviation.   Cardiovascular:      Rate and Rhythm: Normal rate and regular rhythm.      Heart sounds: Normal heart sounds. No murmur heard.    No friction rub. No gallop.   Pulmonary:      Effort: Pulmonary effort is normal. No respiratory distress.      Breath sounds: No stridor. Rales present. No wheezing.   Chest:      Chest wall: No tenderness.   Abdominal:      General: Bowel sounds are normal. There is no distension.      Palpations: Abdomen is soft. There is no mass.      Tenderness: There is no abdominal tenderness. There is no guarding or rebound.   Musculoskeletal:         General: No tenderness or deformity. Normal range of motion.      Cervical back: Normal range of motion and neck supple.      Right lower leg: Edema present.      Left lower leg: Edema present.   Skin:     General: Skin is warm and dry.      Coloration: Skin is not pale.      Findings: No erythema or rash.   Neurological:      Mental Status: She is alert and oriented to person, place, and time.      Cranial Nerves: No cranial nerve deficit.      Motor: No abnormal muscle tone.      Coordination: Coordination normal.   Psychiatric:         Behavior: Behavior normal.         Thought Content: Thought content normal.       Significant Labs: All pertinent labs within the past 24 hours have been reviewed.    Significant Imaging: I have reviewed all pertinent imaging results/findings within the past 24 hours.

## 2022-03-16 NOTE — SUBJECTIVE & OBJECTIVE
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     Current Outpatient Medications on File Prior to Encounter   Medication Sig    acetaminophen (TYLENOL) 325 MG tablet Take 650 mg by mouth every 8 (eight) hours.    amLODIPine (NORVASC) 10 MG tablet Take 1 tablet (10 mg total) by mouth once daily.    benzonatate (TESSALON) 100 MG capsule Take 100 mg by mouth 3 (three) times daily as needed for Cough.    calcium carbonate (OS-APARNA) 500 mg calcium (1,250 mg) chewable tablet Take 2 tablets by mouth once daily.    carvediloL (COREG) 25 MG tablet Take 1 tablet (25 mg total) by mouth 2 (two) times daily with meals. (Patient taking differently: Take 25 mg by mouth 2 (two) times daily with meals. (on Monday, Wednesday, & Friday))    cetirizine (ZYRTEC) 10 MG tablet Take 10 mg by mouth once daily.    cholecalciferol, vitamin D3, (VITAMIN D3) 25 mcg (1,000 unit) capsule Take 1 capsule by mouth once daily.    cloNIDine (CATAPRES) 0.1 MG tablet Take 0.1 mg by mouth every 8 (eight) hours as needed (hypertension).    docusate sodium (COLACE) 100 MG capsule Take 100 mg by mouth once daily. (on Tuesday, Thursday, Saturday, & Sunday)    doxycycline (VIBRAMYCIN) 100 MG Cap Take 100 mg by mouth once daily.    ethyl chloride 100% 100 % spray Apply topically as needed (to access cannulation once daily on Monday, Wednesday, & Friday).    ferric citrate (AURYXIA ORAL) Take 1 tablet by mouth 2 (two) times  daily with meals.    gabapentin (NEURONTIN) 100 MG capsule Take 100 mg by mouth every evening.    hydrocortisone (ANUSOL-HC) 2.5 % rectal cream Apply to hemorrhoids every 12 hours    hydrOXYzine pamoate (VISTARIL) 25 MG Cap Take 25 mg by mouth every 6 (six) hours as needed (allergies).    insulin aspart U-100 (NOVOLOG) 100 unit/mL injection Inject into the skin 3 (three) times daily before meals. using sliding scale    melatonin 5 mg Cap Take 1 capsule by mouth nightly.    mirtazapine (REMERON SOL-TAB) 15 MG disintegrating tablet Take 15 mg by mouth every evening.    pantoprazole (PROTONIX) 40 MG tablet Take 1 tablet (40 mg total) by mouth 2 (two) times daily.    polyethylene glycol (GLYCOLAX) 17 gram PwPk Take 17 g by mouth once daily.    polyvinyl alcohol-povidone (ARTIFICIAL TEARS,PVALCH-POVID,) 0.5-0.6 % Drop Place 1 drop into both eyes every 12 (twelve) hours as needed (dry eyes).    pravastatin (PRAVACHOL) 40 MG tablet Take 1 tablet (40 mg total) by mouth once daily.    psyllium (METAMUCIL) powder Take 1 packet by mouth once daily.    traMADol (ULTRAM) 50 mg tablet Take 50 mg by mouth every 12 (twelve) hours as needed for Pain.     Family History       Problem Relation (Age of Onset)    Diabetes Maternal Aunt, Paternal Aunt, Child    Glaucoma Maternal Aunt, Paternal Aunt    Heart attack Maternal Aunt (80), Brother (79)          Tobacco Use    Smoking status: Never Smoker    Smokeless tobacco: Never Used   Substance and Sexual Activity    Alcohol use: No    Drug use: No    Sexual activity: Not on file     Review of Systems   Unable to perform ROS: Mental status change   Objective:     Vital Signs (Most Recent):  Temp: 98.8 °F (37.1 °C) (03/16/22 0749)  Pulse: 75 (03/16/22 0751)  Resp: 17 (03/16/22 0751)  BP: (!) 143/65 (03/16/22 0749)  SpO2: 95 % (03/16/22 0751)   Vital Signs (24h Range):  Temp:  [96.8 °F (36 °C)-98.8 °F (37.1 °C)] 98.8 °F (37.1 °C)  Pulse:  [72-86] 75  Resp:  [9-24] 17  SpO2:  [87 %-100 %] 95  %  BP: ()/(33-82) 143/65     Weight: 64 kg (141 lb)  Body mass index is 21.44 kg/m².    SpO2: 95 %  O2 Device (Oxygen Therapy): nasal cannula      Intake/Output Summary (Last 24 hours) at 3/16/2022 1101  Last data filed at 3/16/2022 0532  Gross per 24 hour   Intake 116.93 ml   Output 0 ml   Net 116.93 ml       Lines/Drains/Airways       Central Venous Catheter Line  Duration                  Hemodialysis AV Graft Right thigh -- days              Drain  Duration                  Hemodialysis AV Fistula Right thigh -- days              Peripheral Intravenous Line  Duration                  Peripheral IV - Single Lumen 03/15/22 20 G Left Antecubital 1 day         Peripheral IV - Single Lumen 03/15/22 1920 20 G Right Antecubital <1 day                    Physical Exam  Vitals and nursing note reviewed.   Constitutional:       General: She is not in acute distress.     Appearance: She is well-developed. She is ill-appearing. She is not diaphoretic.      Comments: Lethargic   HENT:      Head: Normocephalic and atraumatic.   Eyes:      General:         Right eye: No discharge.         Left eye: No discharge.      Pupils: Pupils are equal, round, and reactive to light.   Neck:      Thyroid: No thyromegaly.      Vascular: No JVD.      Trachea: No tracheal deviation.      Comments: +JVD  Cardiovascular:      Rate and Rhythm: Normal rate and regular rhythm.      Heart sounds: Normal heart sounds, S1 normal and S2 normal. No murmur heard.  Pulmonary:      Effort: Pulmonary effort is normal. No respiratory distress.      Breath sounds: No wheezing.      Comments: Diminished BS at bases  Abdominal:      General: There is no distension.      Tenderness: There is no rebound.   Musculoskeletal:      Cervical back: Neck supple.      Right lower leg: Edema present.      Left lower leg: Edema present.   Skin:     General: Skin is warm and dry.      Findings: No erythema.      Comments: Dry, skin BLE   Neurological:      Mental  Status: She is alert.      Comments: Lethargic, did not respond to verbal stimuli, would occasionally moan or mumble       Significant Labs: CMP   Recent Labs   Lab 03/15/22  1304 03/16/22  0539    137   K 4.7 4.3    100   CO2 22* 23   * 84   BUN 30* 28*   CREATININE 5.6* 4.9*   CALCIUM 8.5* 8.3*   PROT 7.6 7.4   ALBUMIN 3.1* 3.0*   BILITOT 0.8 0.6   ALKPHOS 165* 159*   * 296*   ALT 87* 237*   ANIONGAP 15 14   ESTGFRAFRICA 8* 9*   EGFRNONAA 7* 8*   , CBC   Recent Labs   Lab 03/15/22  1430 03/16/22  0539   WBC 13.54* 8.01   HGB 10.8* 10.3*   HCT 36.5* 34.2*   PLT 83* 91*   , Troponin   Recent Labs   Lab 03/15/22  1304 03/15/22  1854 03/16/22  0539   TROPONINI 5.194* 9.777* 13.158*   , and All pertinent lab results from the last 24 hours have been reviewed.    Significant Imaging: Echocardiogram: Transthoracic echo (TTE) complete ([facility] Only):   Results for orders placed or performed during the hospital encounter of 03/15/22   Echo   Result Value Ref Range    BSA 1.75 m2    TDI SEPTAL 0.06 m/s    LV LATERAL E/E' RATIO 12.00 m/s    LV SEPTAL E/E' RATIO 20.00 m/s    LA WIDTH 3.10 cm    IVC diameter 2.14 cm    Left Ventricular Outflow Tract Mean Velocity 0.39469753086233 cm/s    Left Ventricular Outflow Tract Mean Gradient 2.27 mmHg    TDI LATERAL 0.10 m/s    LVIDd 4.10 3.5 - 6.0 cm    IVS 1.54 (A) 0.6 - 1.1 cm    Posterior Wall 1.77 (A) 0.6 - 1.1 cm    Ao root annulus 3.37 cm    LVIDs 2.76 2.1 - 4.0 cm    FS 33 28 - 44 %    LA volume 35.01 cm3    Sinus 3.17 cm    STJ 3.09 cm    Ascending aorta 3.21 cm    LV mass 281.77 g    LA size 4.03 cm    RVDD 4.34 cm    TAPSE 1.62 cm    Left Ventricle Relative Wall Thickness 0.86 cm    AV mean gradient 6 mmHg    AV valve area 1.91 cm2    AV Velocity Ratio 0.64     AV index (prosthetic) 0.65     MV valve area p 1/2 method 4.57 cm2    E/A ratio 0.90     Mean e' 0.08 m/s    E wave deceleration time 166.957815917766023 msec    IVRT 71.537187900559654 msec     LVOT diameter 1.93 cm    LVOT area 2.9 cm2    LVOT peak gino 0.96 m/s    LVOT peak VTI 24.00 cm    Ao peak gino 1.51 m/s    Ao VTI 36.8 cm    RVOT peak gino 0.38 m/s    RVOT peak VTI 8.2 cm    LVOT stroke volume 70.18 cm3    AV peak gradient 9 mmHg    PV mean gradient 0.36 mmHg    E/E' ratio 15.00 m/s    MV Peak E Gino 1.20 m/s    TR Max Gino 3.24 m/s    MV stenosis pressure 1/2 time 48.030583335590499 ms    MV Peak A Gino 1.33 m/s    LV Systolic Volume 28.51 mL    LV Systolic Volume Index 16.2 mL/m2    LV Diastolic Volume 74.29 mL    LV Diastolic Volume Index 42.21 mL/m2    LA Volume Index 19.9 mL/m2    LV Mass Index 160 g/m2    Echo EF Estimated 62 %    RA Major Axis 4.34 cm    Left Atrium Minor Axis 3.02 cm    Left Atrium Major Axis 3.63 cm    Triscuspid Valve Regurgitation Peak Gradient 42 mmHg    RA Width 4.39 cm   , EKG: Reviewed, and X-Ray: CXR: X-Ray Chest 1 View (CXR): No results found for this visit on 03/15/22. and X-Ray Chest PA and Lateral (CXR): No results found for this visit on 03/15/22.

## 2022-03-16 NOTE — PT/OT/SLP EVAL
"Occupational Therapy   Evaluation and Discharge Note    Name: Stephanie Wilder  MRN: [id number]  Admitting Diagnosis:  Encephalopathy, metabolic   Recent Surgery: * No surgery found *      Recommendations:     Discharge Recommendations: nursing facility, basic  Discharge Equipment Recommendations:  none  Barriers to discharge:  None    Assessment:     Stephanie Wilder is a 81 y.o. female with a medical diagnosis of Encephalopathy, metabolic. At this time, patient is functioning at their prior level of function and does not require further acute OT services.     Plan:     During this hospitalization, patient does not require further acute OT services.  Please re-consult if situation changes.    · Plan of Care Reviewed with: patient    Subjective     Chief Complaint: "I'm cold"  Patient/Family Comments/goals: none stated    Occupational Profile:  Living Environment: lives at [facility]   Previous level of function: (A) with ADLs and t/fs, uses WC to get around  Roles and Routines: UNKNOWN  Equipment Used at home:  wheelchair  Assistance upon Discharge: NH staff    Pain/Comfort:  · Pain Rating 1: 0/10    Patients cultural, spiritual, Confucianist conflicts given the current situation:      Objective:     Communicated with: Nurse Neves and epic chart review prior to session.  Patient found right sidelying with peripheral IV, telemetry, bed alarm, oxygen (AVASYS) upon OT entry to room.    General Precautions: Standard, fall, vision impaired   Orthopedic Precautions:N/A   Braces: N/A  Respiratory Status: Nasal cannula, flow 3 L/min     Occupational Performance:    Bed Mobility:    · Patient completed Rolling/Turning to Left with  moderate assistance  · Patient completed Rolling/Turning to Right with moderate assistance  · Patient completed Scooting/Bridging with moderate assistance  · Patient completed Supine to Sit with moderate assistance and 2 persons    Functional Mobility/Transfers:  · Patient completed Bed <> Chair " Transfer using Stand Pivot technique with moderate assistance and of 2 persons with hand-held assist  · Functional Mobility: unable to step      Activities of Daily Living:  · Upper Body Dressing: moderate assistance .  · Lower Body Dressing: total assistance .    Cognitive/Visual Perceptual:  Cognitive/Psychosocial Skills:     -       Oriented to: Person and Place   -       Follows Commands/attention:Follows one-step commands  -       Communication: clear/fluent  -       Memory: Poor immediate recall  -       Safety awareness/insight to disability: impaired   -       Mood/Affect/Coping skills/emotional control: Appropriate to situation  Visual/Perceptual:      -(R) Eye blindness .    Physical Exam:  Balance:    -       Fair+  Postural examination/scapula alignment:    -       Rounded shoulders  -       Forward head  Dominant hand:    -       right  Upper Extremity Range of Motion:     -       Right Upper Extremity: WFL  -       Left Upper Extremity: WFL  Upper Extremity Strength:    -       Right Upper Extremity: WFL  -       Left Upper Extremity: WFL   Strength:    -       Right Upper Extremity: WFL  -       Left Upper Extremity: WFL    AMPAC 6 Click ADL:  AMPAC Total Score: 12    Treatment & Education:  OT Eval completed. Pt at [facility] and does not require skilled OT services at this time. Pt is discharged to [facility] for continued maintenance program.   Pt educated in safety with bed mobility and t/fs, role of OT and POC. Pt encouraged to increase time OOB and to eat all meals sitting upright. Pt instructed to call for assistance when ready to return to bed. Pt verbalized understanding.   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

## 2022-03-16 NOTE — PLAN OF CARE
Duration: 2 hours    Stable/unstable: stable    Ultrafiltration volume: 3 liters net    Notes: Tolerated. No access issues.

## 2022-03-17 PROBLEM — D69.6 THROMBOCYTOPENIA: Status: ACTIVE | Noted: 2022-03-17

## 2022-03-17 PROBLEM — G93.41 ENCEPHALOPATHY, METABOLIC: Status: RESOLVED | Noted: 2022-03-15 | Resolved: 2022-03-17

## 2022-03-17 PROBLEM — N30.00 ACUTE CYSTITIS WITHOUT HEMATURIA: Status: RESOLVED | Noted: 2022-03-16 | Resolved: 2022-03-17

## 2022-03-17 LAB
ALBUMIN SERPL BCP-MCNC: 2.7 G/DL (ref 3.5–5.2)
ALP SERPL-CCNC: 132 U/L (ref 55–135)
ALT SERPL W/O P-5'-P-CCNC: 208 U/L (ref 10–44)
ANION GAP SERPL CALC-SCNC: 12 MMOL/L (ref 8–16)
APTT BLDCRRT: 53.7 SEC (ref 21–32)
AST SERPL-CCNC: 168 U/L (ref 10–40)
BACTERIA UR CULT: NORMAL
BACTERIA UR CULT: NORMAL
BASOPHILS # BLD AUTO: 0.01 K/UL (ref 0–0.2)
BASOPHILS NFR BLD: 0.2 % (ref 0–1.9)
BILIRUB SERPL-MCNC: 0.5 MG/DL (ref 0.1–1)
BUN SERPL-MCNC: 40 MG/DL (ref 8–23)
CALCIUM SERPL-MCNC: 7.6 MG/DL (ref 8.7–10.5)
CHLORIDE SERPL-SCNC: 99 MMOL/L (ref 95–110)
CO2 SERPL-SCNC: 23 MMOL/L (ref 23–29)
CREAT SERPL-MCNC: 6 MG/DL (ref 0.5–1.4)
DIFFERENTIAL METHOD: ABNORMAL
EOSINOPHIL # BLD AUTO: 0.1 K/UL (ref 0–0.5)
EOSINOPHIL NFR BLD: 1.8 % (ref 0–8)
EOSINOPHIL NFR BLD: 2.3 % (ref 0–8)
EOSINOPHIL NFR BLD: 2.3 % (ref 0–8)
ERYTHROCYTE [DISTWIDTH] IN BLOOD BY AUTOMATED COUNT: 15.7 % (ref 11.5–14.5)
ERYTHROCYTE [DISTWIDTH] IN BLOOD BY AUTOMATED COUNT: 15.9 % (ref 11.5–14.5)
ERYTHROCYTE [DISTWIDTH] IN BLOOD BY AUTOMATED COUNT: 16 % (ref 11.5–14.5)
EST. GFR  (AFRICAN AMERICAN): 7 ML/MIN/1.73 M^2
EST. GFR  (NON AFRICAN AMERICAN): 6 ML/MIN/1.73 M^2
FERRITIN SERPL-MCNC: 1300 NG/ML (ref 20–300)
FOLATE SERPL-MCNC: 6.3 NG/ML (ref 4–24)
GLUCOSE SERPL-MCNC: 128 MG/DL (ref 70–110)
HAV IGM SERPL QL IA: NEGATIVE
HBV CORE AB SERPL QL IA: NEGATIVE
HBV CORE IGM SERPL QL IA: NEGATIVE
HBV SURFACE AB SER-ACNC: POSITIVE M[IU]/ML
HBV SURFACE AG SERPL QL IA: NEGATIVE
HBV SURFACE AG SERPL QL IA: NEGATIVE
HCT VFR BLD AUTO: 26 % (ref 37–48.5)
HCT VFR BLD AUTO: 29.1 % (ref 37–48.5)
HCT VFR BLD AUTO: 31.3 % (ref 37–48.5)
HCV AB SERPL QL IA: NEGATIVE
HGB BLD-MCNC: 7.4 G/DL (ref 12–16)
HGB BLD-MCNC: 8.8 G/DL (ref 12–16)
HGB BLD-MCNC: 9.6 G/DL (ref 12–16)
IMM GRANULOCYTES # BLD AUTO: 0.02 K/UL (ref 0–0.04)
IMM GRANULOCYTES # BLD AUTO: 0.02 K/UL (ref 0–0.04)
IMM GRANULOCYTES # BLD AUTO: 0.03 K/UL (ref 0–0.04)
IMM GRANULOCYTES NFR BLD AUTO: 0.3 % (ref 0–0.5)
IMM GRANULOCYTES NFR BLD AUTO: 0.4 % (ref 0–0.5)
IMM GRANULOCYTES NFR BLD AUTO: 0.5 % (ref 0–0.5)
INR PPP: 1.1 (ref 0.8–1.2)
IRON SERPL-MCNC: 41 UG/DL (ref 30–160)
LYMPHOCYTES # BLD AUTO: 0.9 K/UL (ref 1–4.8)
LYMPHOCYTES # BLD AUTO: 1 K/UL (ref 1–4.8)
LYMPHOCYTES # BLD AUTO: 1.2 K/UL (ref 1–4.8)
LYMPHOCYTES NFR BLD: 16.8 % (ref 18–48)
LYMPHOCYTES NFR BLD: 19 % (ref 18–48)
LYMPHOCYTES NFR BLD: 19.8 % (ref 18–48)
MCH RBC QN AUTO: 32.8 PG (ref 27–31)
MCH RBC QN AUTO: 32.9 PG (ref 27–31)
MCH RBC QN AUTO: 33.7 PG (ref 27–31)
MCHC RBC AUTO-ENTMCNC: 28.5 G/DL (ref 32–36)
MCHC RBC AUTO-ENTMCNC: 30.2 G/DL (ref 32–36)
MCHC RBC AUTO-ENTMCNC: 30.7 G/DL (ref 32–36)
MCV RBC AUTO: 109 FL (ref 82–98)
MCV RBC AUTO: 110 FL (ref 82–98)
MCV RBC AUTO: 116 FL (ref 82–98)
MONOCYTES # BLD AUTO: 0.8 K/UL (ref 0.3–1)
MONOCYTES # BLD AUTO: 1 K/UL (ref 0.3–1)
MONOCYTES # BLD AUTO: 1.1 K/UL (ref 0.3–1)
MONOCYTES NFR BLD: 17.3 % (ref 4–15)
MONOCYTES NFR BLD: 17.6 % (ref 4–15)
MONOCYTES NFR BLD: 18.2 % (ref 4–15)
MPV, BLUE TOP: 11.3 FL (ref 9.2–12.9)
NEUTROPHILS # BLD AUTO: 2.9 K/UL (ref 1.8–7.7)
NEUTROPHILS # BLD AUTO: 3.6 K/UL (ref 1.8–7.7)
NEUTROPHILS # BLD AUTO: 3.6 K/UL (ref 1.8–7.7)
NEUTROPHILS NFR BLD: 59.8 % (ref 38–73)
NEUTROPHILS NFR BLD: 60.8 % (ref 38–73)
NEUTROPHILS NFR BLD: 62.5 % (ref 38–73)
NRBC BLD-RTO: 1 /100 WBC
NRBC BLD-RTO: 1 /100 WBC
NRBC BLD-RTO: 2 /100 WBC
PATH REV BLD -IMP: NORMAL
PLATELET # BLD AUTO: 58 K/UL (ref 150–450)
PLATELET # BLD AUTO: 92 K/UL (ref 150–450)
PLATELET # BLD AUTO: 93 K/UL (ref 150–450)
PLATELET BLD QL SMEAR: ABNORMAL
PLATELET BLD QL SMEAR: ABNORMAL
PLATELET, BLUE TOP: 81 K/UL (ref 150–450)
PMV BLD AUTO: 10.6 FL (ref 9.2–12.9)
PMV BLD AUTO: 11.2 FL (ref 9.2–12.9)
PMV BLD AUTO: 9.8 FL (ref 9.2–12.9)
POCT GLUCOSE: 132 MG/DL (ref 70–110)
POCT GLUCOSE: 137 MG/DL (ref 70–110)
POCT GLUCOSE: 151 MG/DL (ref 70–110)
POTASSIUM SERPL-SCNC: 4.3 MMOL/L (ref 3.5–5.1)
PROT SERPL-MCNC: 6.6 G/DL (ref 6–8.4)
PROTHROMBIN TIME: 11.9 SEC (ref 9–12.5)
RBC # BLD AUTO: 2.25 M/UL (ref 4–5.4)
RBC # BLD AUTO: 2.68 M/UL (ref 4–5.4)
RBC # BLD AUTO: 2.85 M/UL (ref 4–5.4)
SATURATED IRON: 22 % (ref 20–50)
SODIUM SERPL-SCNC: 134 MMOL/L (ref 136–145)
TOTAL IRON BINDING CAPACITY: 188 UG/DL (ref 250–450)
TRANSFERRIN SERPL-MCNC: 127 MG/DL (ref 200–375)
VIT B12 SERPL-MCNC: 1559 PG/ML (ref 210–950)
WBC # BLD AUTO: 4.74 K/UL (ref 3.9–12.7)
WBC # BLD AUTO: 5.67 K/UL (ref 3.9–12.7)
WBC # BLD AUTO: 6.02 K/UL (ref 3.9–12.7)

## 2022-03-17 PROCEDURE — 27000221 HC OXYGEN, UP TO 24 HOURS

## 2022-03-17 PROCEDURE — 99223 PR INITIAL HOSPITAL CARE,LEVL III: ICD-10-PCS | Mod: ,,, | Performed by: INTERNAL MEDICINE

## 2022-03-17 PROCEDURE — 99900035 HC TECH TIME PER 15 MIN (STAT)

## 2022-03-17 PROCEDURE — 85025 COMPLETE CBC W/AUTO DIFF WBC: CPT | Performed by: EMERGENCY MEDICINE

## 2022-03-17 PROCEDURE — 85060 BLOOD SMEAR INTERPRETATION: CPT | Mod: ,,, | Performed by: STUDENT IN AN ORGANIZED HEALTH CARE EDUCATION/TRAINING PROGRAM

## 2022-03-17 PROCEDURE — 82728 ASSAY OF FERRITIN: CPT | Performed by: NURSE PRACTITIONER

## 2022-03-17 PROCEDURE — 25000003 PHARM REV CODE 250: Performed by: NURSE PRACTITIONER

## 2022-03-17 PROCEDURE — 99233 SBSQ HOSP IP/OBS HIGH 50: CPT | Mod: ,,, | Performed by: INTERNAL MEDICINE

## 2022-03-17 PROCEDURE — 36415 COLL VENOUS BLD VENIPUNCTURE: CPT | Performed by: FAMILY MEDICINE

## 2022-03-17 PROCEDURE — 80053 COMPREHEN METABOLIC PANEL: CPT | Performed by: FAMILY MEDICINE

## 2022-03-17 PROCEDURE — 36415 COLL VENOUS BLD VENIPUNCTURE: CPT | Performed by: PHYSICIAN ASSISTANT

## 2022-03-17 PROCEDURE — 85025 COMPLETE CBC W/AUTO DIFF WBC: CPT | Mod: 91 | Performed by: NURSE PRACTITIONER

## 2022-03-17 PROCEDURE — 21400001 HC TELEMETRY ROOM

## 2022-03-17 PROCEDURE — 85025 COMPLETE CBC W/AUTO DIFF WBC: CPT | Mod: 91 | Performed by: PHYSICIAN ASSISTANT

## 2022-03-17 PROCEDURE — 85610 PROTHROMBIN TIME: CPT | Performed by: NURSE PRACTITIONER

## 2022-03-17 PROCEDURE — 85730 THROMBOPLASTIN TIME PARTIAL: CPT | Performed by: FAMILY MEDICINE

## 2022-03-17 PROCEDURE — 85060 PATHOLOGIST REVIEW: ICD-10-PCS | Mod: ,,, | Performed by: STUDENT IN AN ORGANIZED HEALTH CARE EDUCATION/TRAINING PROGRAM

## 2022-03-17 PROCEDURE — 25000242 PHARM REV CODE 250 ALT 637 W/ HCPCS: Performed by: NURSE PRACTITIONER

## 2022-03-17 PROCEDURE — 99223 1ST HOSP IP/OBS HIGH 75: CPT | Mod: ,,, | Performed by: INTERNAL MEDICINE

## 2022-03-17 PROCEDURE — 94640 AIRWAY INHALATION TREATMENT: CPT

## 2022-03-17 PROCEDURE — 92610 EVALUATE SWALLOWING FUNCTION: CPT

## 2022-03-17 PROCEDURE — 99233 PR SUBSEQUENT HOSPITAL CARE,LEVL III: ICD-10-PCS | Mod: ,,, | Performed by: PHYSICIAN ASSISTANT

## 2022-03-17 PROCEDURE — 82607 VITAMIN B-12: CPT | Performed by: NURSE PRACTITIONER

## 2022-03-17 PROCEDURE — 94761 N-INVAS EAR/PLS OXIMETRY MLT: CPT

## 2022-03-17 PROCEDURE — 84466 ASSAY OF TRANSFERRIN: CPT | Performed by: NURSE PRACTITIONER

## 2022-03-17 PROCEDURE — 36415 COLL VENOUS BLD VENIPUNCTURE: CPT | Performed by: NURSE PRACTITIONER

## 2022-03-17 PROCEDURE — 85049 AUTOMATED PLATELET COUNT: CPT | Performed by: NURSE PRACTITIONER

## 2022-03-17 PROCEDURE — 25000003 PHARM REV CODE 250: Performed by: FAMILY MEDICINE

## 2022-03-17 PROCEDURE — 99233 PR SUBSEQUENT HOSPITAL CARE,LEVL III: ICD-10-PCS | Mod: ,,, | Performed by: INTERNAL MEDICINE

## 2022-03-17 PROCEDURE — 82746 ASSAY OF FOLIC ACID SERUM: CPT | Performed by: NURSE PRACTITIONER

## 2022-03-17 PROCEDURE — 99233 SBSQ HOSP IP/OBS HIGH 50: CPT | Mod: ,,, | Performed by: PHYSICIAN ASSISTANT

## 2022-03-17 RX ORDER — MUPIROCIN 20 MG/G
OINTMENT TOPICAL 2 TIMES DAILY
Status: DISCONTINUED | OUTPATIENT
Start: 2022-03-17 | End: 2022-03-18 | Stop reason: HOSPADM

## 2022-03-17 RX ORDER — ISOSORBIDE MONONITRATE 30 MG/1
30 TABLET, EXTENDED RELEASE ORAL DAILY
Status: DISCONTINUED | OUTPATIENT
Start: 2022-03-17 | End: 2022-03-18 | Stop reason: HOSPADM

## 2022-03-17 RX ORDER — CLOPIDOGREL BISULFATE 75 MG/1
75 TABLET ORAL DAILY
Status: DISCONTINUED | OUTPATIENT
Start: 2022-03-17 | End: 2022-03-18 | Stop reason: HOSPADM

## 2022-03-17 RX ADMIN — MUPIROCIN: 20 OINTMENT TOPICAL at 08:03

## 2022-03-17 RX ADMIN — METRONIDAZOLE: 10 GEL TOPICAL at 08:03

## 2022-03-17 RX ADMIN — GABAPENTIN 100 MG: 100 CAPSULE ORAL at 10:03

## 2022-03-17 RX ADMIN — CARVEDILOL 3.12 MG: 3.12 TABLET, FILM COATED ORAL at 04:03

## 2022-03-17 RX ADMIN — PANTOPRAZOLE SODIUM 40 MG: 40 TABLET, DELAYED RELEASE ORAL at 08:03

## 2022-03-17 RX ADMIN — IPRATROPIUM BROMIDE AND ALBUTEROL SULFATE 3 ML: 2.5; .5 SOLUTION RESPIRATORY (INHALATION) at 07:03

## 2022-03-17 RX ADMIN — BUDESONIDE 0.5 MG: 0.5 INHALANT ORAL at 07:03

## 2022-03-17 RX ADMIN — ASPIRIN 81 MG CHEWABLE TABLET 81 MG: 81 TABLET CHEWABLE at 08:03

## 2022-03-17 RX ADMIN — ISOSORBIDE MONONITRATE 30 MG: 30 TABLET, EXTENDED RELEASE ORAL at 12:03

## 2022-03-17 RX ADMIN — CLOPIDOGREL 75 MG: 75 TABLET, FILM COATED ORAL at 12:03

## 2022-03-17 RX ADMIN — IPRATROPIUM BROMIDE AND ALBUTEROL SULFATE 3 ML: 2.5; .5 SOLUTION RESPIRATORY (INHALATION) at 01:03

## 2022-03-17 RX ADMIN — METRONIDAZOLE: 10 GEL TOPICAL at 04:03

## 2022-03-17 RX ADMIN — CARVEDILOL 3.12 MG: 3.12 TABLET, FILM COATED ORAL at 08:03

## 2022-03-17 RX ADMIN — PANTOPRAZOLE SODIUM 40 MG: 40 TABLET, DELAYED RELEASE ORAL at 10:03

## 2022-03-17 RX ADMIN — IPRATROPIUM BROMIDE AND ALBUTEROL SULFATE 3 ML: 2.5; .5 SOLUTION RESPIRATORY (INHALATION) at 12:03

## 2022-03-17 NOTE — PROGRESS NOTES
[facility])  Nephrology  Progress Note    Patient Name: Stephanie Wilder  MRN: [id number]  Admission Date: 3/15/2022  Hospital Length of Stay: 1 days  Attending Provider: Romelia Brunner MD   Primary Care Physician: Garry Vazquez MD  Principal Problem:Encephalopathy, metabolic    Subjective:     HPI: Pt was seen and examined. Labs and meds reviewed. Discussed with other providers. Chart was reviewed. Pt is a81 y/o female with ESRD on chronic HD at [facility] who presented with SOB and was found to have NSTEMI (troponin 5). Pt is compliant with HD, but is always fluid overloaded due to XS fluid intake and low intolerance for adequate UF. Pt currently has no CP, denies SOB, but has relatively low O2 sat on 4 L NC.       Interval History: Pt was seen and examined. Labs and meds reviewed. Discussed with other providers. Pt denies SOB. Offered HD to pt as her last HD was 2 days ago. Pt declined. Says would like to go back to the NH.    Review of patient's allergies indicates:  No Known Allergies  Current Facility-Administered Medications   Medication Frequency    acetaminophen tablet 650 mg Q4H PRN    albuterol-ipratropium 2.5 mg-0.5 mg/3 mL nebulizer solution 3 mL Q6H PRN    albuterol-ipratropium 2.5 mg-0.5 mg/3 mL nebulizer solution 3 mL Q6H    aluminum-magnesium hydroxide-simethicone 200-200-20 mg/5 mL suspension 30 mL QID PRN    aspirin chewable tablet 81 mg Daily    budesonide nebulizer solution 0.5 mg Q12H    carvediloL tablet 3.125 mg BID WM    clopidogreL tablet 75 mg Daily    dextrose 10% bolus 125 mL PRN    dextrose 10% bolus 250 mL PRN    docusate sodium capsule 100 mg Daily    gabapentin capsule 100 mg QHS    glucagon (human recombinant) injection 1 mg PRN    glucose chewable tablet 16 g PRN    glucose chewable tablet 24 g PRN    hydrALAZINE injection 10 mg Q6H PRN    insulin aspart U-100 pen 0-5 Units QID (AC + HS) PRN    isosorbide mononitrate 24 hr tablet 30 mg Daily     magnesium oxide tablet 800 mg PRN    magnesium oxide tablet 800 mg PRN    melatonin tablet 6 mg Nightly PRN    metronidazole 1% gel BID    miconazole nitrate 2% ointment BID    mupirocin 2 % ointment BID    naloxone 0.4 mg/mL injection 0.02 mg PRN    ondansetron injection 4 mg Q6H PRN    pantoprazole EC tablet 40 mg BID    polyethylene glycol packet 17 g Daily    potassium bicarbonate disintegrating tablet 35 mEq PRN    potassium bicarbonate disintegrating tablet 50 mEq PRN    potassium bicarbonate disintegrating tablet 60 mEq PRN    potassium, sodium phosphates 280-160-250 mg packet 2 packet PRN    potassium, sodium phosphates 280-160-250 mg packet 2 packet PRN    potassium, sodium phosphates 280-160-250 mg packet 2 packet PRN    prochlorperazine injection Soln 5 mg Q6H PRN    simethicone chewable tablet 80 mg QID PRN    sodium chloride 0.9% flush 10 mL Q8H PRN    traMADoL tablet 50 mg Q12H PRN       Objective:     Vital Signs (Most Recent):  Temp: 98.7 °F (37.1 °C) (03/17/22 1255)  Pulse: 70 (03/17/22 1255)  Resp: 18 (03/17/22 1255)  BP: (!) 134/59 (03/17/22 1255)  SpO2: 98 % (03/17/22 1255)  O2 Device (Oxygen Therapy): nasal cannula (03/17/22 0736)   Vital Signs (24h Range):  Temp:  [97.6 °F (36.4 °C)-99.3 °F (37.4 °C)] 98.7 °F (37.1 °C)  Pulse:  [67-80] 70  Resp:  [16-18] 18  SpO2:  [92 %-100 %] 98 %  BP: (109-148)/(55-62) 134/59     Weight: 68.4 kg (150 lb 12.7 oz) (03/17/22 0405)  Body mass index is 22.93 kg/m².  Body surface area is 1.81 meters squared.    I/O last 3 completed shifts:  In: 895.8 [P.O.:250; I.V.:624.7; IV Piggyback:21.1]  Out: 0     Physical Exam  Vitals and nursing note reviewed.   Constitutional:       Appearance: Normal appearance.   Cardiovascular:      Rate and Rhythm: Normal rate and regular rhythm.      Heart sounds:     No friction rub.   Pulmonary:      Effort: Pulmonary effort is normal. No respiratory distress.      Breath sounds: Rales present.   Abdominal:       Palpations: Abdomen is soft.      Tenderness: There is no abdominal tenderness.   Musculoskeletal:      Right lower leg: Edema present.      Left lower leg: Edema present.   Neurological:      Mental Status: She is alert.       Significant Labs: reviewed  BMP  Lab Results   Component Value Date     (L) 03/17/2022    K 4.3 03/17/2022    CL 99 03/17/2022    CO2 23 03/17/2022    BUN 40 (H) 03/17/2022    CREATININE 6.0 (H) 03/17/2022    CALCIUM 7.6 (L) 03/17/2022    ANIONGAP 12 03/17/2022    ESTGFRAFRICA 7 (A) 03/17/2022    EGFRNONAA 6 (A) 03/17/2022     Lab Results   Component Value Date    WBC 6.02 03/17/2022    HGB 8.8 (L) 03/17/2022    HCT 29.1 (L) 03/17/2022     (H) 03/17/2022    PLT 92 (L) 03/17/2022       Recent Labs   Lab 03/16/22 2017   TROPONINI 11.576*         Significant Imaging: reviewed    Assessment/Plan:     82 y/o female with ESRD on chronic HD presented with SOB and NSTEMI:     End stage renal disease  ESRD on chronic HD q MWF at [facility].  S/p HD 2 days ago, tolerated HD well. 3 L removed  Dialysis was done only partially in order to remove enough fluid to prevent respiratory worsening but also to provide coronary perfusion in light of the acute MI  Still post MI, last troponin last pm was still quite high    As last HD was 2 days ago, offered HD to pt, but pt thinks she does not need it  K normal  O2 sat good  No acute indications for HD today     Fluid gain not new, always fluid overloaded due to XS fluid intake and low tolerance for adequate UF.     NSTEMI (non-ST elevated myocardial infarction)  H/o of CAD, past MI, diasttolic CHF  Is on ASA, beta blockers, and IV heparin  Discussed with primary team  Reviewed cardiology note  Agree with current mgmt  Medical care     Acute hypoxemic respiratory failure  Presented with hypoxia and pulm edema  Improved with UF/HD  Respiratory acidosis on ABG     DM (diabetes mellitus)  Reviewed the chart        Plans and  recommendations:  As discussed above  Total time spent 35 minutes including time needed to review the records, the   patient evaluation, documentation, face-to-face discussion with the patient,   more than 50% of the time was spent on coordination of care and counseling.          Blayne Jang MD  Nephrology  '[place] - Telemetry ([facility])

## 2022-03-17 NOTE — PLAN OF CARE
Patient is resident of [facility].  Referral placed in [facility].       03/17/22 1543   Post-Acute Status   Post-Acute Authorization Placement   Post-Acute Placement Status Referrals Sent   Discharge Plan   Discharge Plan A Return to nursing home

## 2022-03-17 NOTE — ASSESSMENT & PLAN NOTE
80 y/o female with ESRD on chronic HD presented with SOB and NSTEMI:     End stage renal disease  ESRD on chronic HD q MWF at [facility].  S/p HD late yesterday, tolerated HD well. 3 L removed  Dialysis was done only partially in order to remove enough fluid to prevent respiratory worsening but also to provide coronary perfusion in light of the acute MI  Pulmonary edema improved  K normal  O2 sat improved with HD  No acute indications for HD today  Acute MI, will hold HD today     Fluid gain not new, always fluid overloaded due to XS fluid intake and low tolerance for adequate UF.     NSTEMI (non-ST elevated myocardial infarction)  H/o of CAD, past MI, diasttolic CHF  Expect has extensive, diffuse atherosclerosis   Is on ASA, beta blockers, and IV heparin  Discussed with primary team  Reviewed cardiology note  Will defer decision making     Acute hypoxemic respiratory failure  Presented with hypoxia and pulm edema  Improved with UF/HD  Respiratory acidosis on ABG     DM (diabetes mellitus)  Reviewed the chart        Plans and recommendations:  As discussed above  Total time spent 35 minutes including time needed to review the records, the   patient evaluation, documentation, face-to-face discussion with the patient,   more than 50% of the time was spent on coordination of care and counseling.

## 2022-03-17 NOTE — ASSESSMENT & PLAN NOTE
3/17/22: cardiology recommended Heme/Onc consult due to thrombocytopenia and need for Plavix. Heme/Onc recommended platelet blue top which showed platelets 81 and peripheral smear-pending. Iron studies, folate pending.   Abdominal u/s ordered 2/2 thrombocytopenia and elevated LFTs- r/o Cirrhosis and splenomegaly. Hepatitis panel still pending.   Possible discharge tomorrow back to nursing home if heme/onc w/u stable.

## 2022-03-17 NOTE — ASSESSMENT & PLAN NOTE
--Continue serial troponin, heparin drip, ASA, BB per primary team  --Cardiology consulted and following

## 2022-03-17 NOTE — DISCHARGE INSTRUCTIONS
Aspiration precautions:   Mechanical soft, Chopped meat, No Rice, Thin liquids  Aspiration Precautions: 1 bite/sip at a time, Assistance with meals, Eliminate distractions, Feed only when awake/alert, HOB to 90 degrees, Meds crushed in puree, Remain upright 30 minutes post meal and Small bites/sips

## 2022-03-17 NOTE — SUBJECTIVE & OBJECTIVE
Interval History: see hospital course     Review of Systems   Constitutional:  Positive for activity change, appetite change and fatigue. Negative for chills, diaphoresis, fever and unexpected weight change.   HENT:  Negative for congestion, nosebleeds, sinus pressure and sore throat.    Eyes:  Positive for visual disturbance. Negative for pain and discharge.   Respiratory:  Negative for cough, chest tightness, wheezing and stridor.    Cardiovascular:  Negative for chest pain, palpitations and leg swelling.   Gastrointestinal:  Negative for abdominal distention, abdominal pain, blood in stool, constipation, diarrhea, nausea and vomiting.   Endocrine: Negative for cold intolerance and heat intolerance.   Genitourinary:  Negative for difficulty urinating, dysuria, flank pain, frequency and urgency.   Musculoskeletal:  Negative for arthralgias, back pain, joint swelling, myalgias, neck pain and neck stiffness.   Skin:  Negative for rash and wound.   Allergic/Immunologic: Negative for food allergies and immunocompromised state.   Neurological:  Positive for weakness (generalized). Negative for dizziness, seizures, syncope, facial asymmetry, speech difficulty, light-headedness, numbness and headaches.   Hematological:  Negative for adenopathy.   Psychiatric/Behavioral:  Negative for agitation, confusion and hallucinations.    Objective:     Vital Signs (Most Recent):  Temp: 98.7 °F (37.1 °C) (03/17/22 1255)  Pulse: 76 (03/17/22 1341)  Resp: 18 (03/17/22 1341)  BP: (!) 134/59 (03/17/22 1255)  SpO2: 97 % (03/17/22 1341) Vital Signs (24h Range):  Temp:  [97.6 °F (36.4 °C)-99.3 °F (37.4 °C)] 98.7 °F (37.1 °C)  Pulse:  [68-80] 76  Resp:  [16-18] 18  SpO2:  [92 %-100 %] 97 %  BP: (109-148)/(55-62) 134/59     Weight: 68.4 kg (150 lb 12.7 oz)  Body mass index is 22.93 kg/m².    Intake/Output Summary (Last 24 hours) at 3/17/2022 1529  Last data filed at 3/17/2022 0835  Gross per 24 hour   Intake 914.87 ml   Output --   Net 914.87  ml        Physical Exam  Vitals and nursing note reviewed.   Constitutional:       General: She is not in acute distress.     Appearance: She is well-developed. She is not diaphoretic.   HENT:      Head: Normocephalic and atraumatic.      Nose: Nose normal.   Eyes:      General: No scleral icterus.     Conjunctiva/sclera: Conjunctivae normal.   Neck:      Vascular: JVD present.      Trachea: No tracheal deviation.   Cardiovascular:      Rate and Rhythm: Normal rate and regular rhythm.      Heart sounds: Normal heart sounds. No murmur heard.    No friction rub. No gallop.   Pulmonary:      Effort: Pulmonary effort is normal. No respiratory distress.      Breath sounds: No stridor. Rales present. No wheezing.   Chest:      Chest wall: No tenderness.   Abdominal:      General: Bowel sounds are normal. There is no distension.      Palpations: Abdomen is soft. There is no mass.      Tenderness: There is no abdominal tenderness. There is no guarding or rebound.   Musculoskeletal:         General: No tenderness or deformity. Normal range of motion.      Cervical back: Normal range of motion and neck supple.      Right lower leg: Edema present.      Left lower leg: Edema present.   Skin:     General: Skin is warm and dry.      Coloration: Skin is not pale.      Findings: No erythema or rash.   Neurological:      Mental Status: She is alert and oriented to person, place, and time.      Cranial Nerves: No cranial nerve deficit.      Motor: No abnormal muscle tone.      Coordination: Coordination normal.   Psychiatric:         Behavior: Behavior normal.         Thought Content: Thought content normal.       Significant Labs: All pertinent labs within the past 24 hours have been reviewed.    Significant Imaging: I have reviewed all pertinent imaging results/findings within the past 24 hours.

## 2022-03-17 NOTE — PROGRESS NOTES
[facility]  Progress Note    Patient Name: Stephanie Wilder  MRN: [id number]  Patient Class: IP- Inpatient   Admission Date: 3/15/2022  Length of Stay: 1 days  Attending Physician: Romelia Brunner MD  Primary Care Provider: Garry Vazquez MD        Subjective:     Principal Problem:Encephalopathy, metabolic        HPI:   The patient is a 80 yo female with DM, HTN, ESRD on HD, GERD, Bipolar d/o, Breast cancer, Tuberculosis, mild cognitive impairment, Right eye blindness, and hx MRSA bacteremia with mitral vegetation -source thought to be right thigh graft but pt refused removal who presented to ED after being found at [facility] unresponsive, hypoxemic, and hypoglycemic with CBG of 40.  At her nursing home, [facility] placed a non-rebreather mask and gave 2 mg glucagon IM. [facility] then weaned O2 to a nasal cannula and administered 1 amp D5W.  On arrival to ED, pt was on 2 liters NC, CT head showed nothing acute. CXR showed pulmonary edema. EKG showed NSR with 1 st degree AV block, RBB, nonspecific changes. Troponin 5.1. BNP 4295. WBC 13, Mildy elevated LFTs, INR 1.4, +UTI. Glucose 143.    Pt is drowsy, disoriented x 3, arouses to tactile stimulation, and only mumbles incomprehensible words.   Nephrology and cardiology consulted      Overview/Hospital Course:  80 yo female with DM, HTN, ESRD on HD, GERD, Bipolar d/o, Breast cancer, Tuberculosis, mild cognitive impairment, Right eye blindness, and hx MRSA bacteremia with mitral vegetation -source thought to be right thigh graft but pt refused removal who was admitted for AMS, NSTEMI, Hypoglycemia, UTI, Acute hypoxic respiratory failure 2/2 pulmonary edema, and elevated LFTs. She was placed on IV heparin, ASA, BB. Blood sugars improved on arrival. She was placed on D5W at 25ml/hr. Blood sugar stabilized. Nephrology was consulted and pt received urgent HD. Cardiology was consulted    3/16/22: Pt remains drowsy but is easily arousable to AAOx3  status. Oxygen status improved after UF with HD. Oxygen weaned from 4 liters NC to 2 liters NC. Blood sugars improved but remain low 100s on D5W.   Troponin 5.1>9.7>13.1>13.7. Echo showed normal LVEF, Diastolic dysfunction, No WMAs. Cardiology recommended medical management for now. Cont IV Heparin, BB, ASA.   Afebrile, WBC trended down to normal. Blood cultures show NGTD, urine culture pending.     3/17/22: AAOx3, Back to baseline. Hypoglycemia resolved-D5W discontinued. Oxygenation stable. No chest pain. Urine culture grew multiple organisms- none in predominance. IV Rocephin d/c'd.   Cardiology recommended d/c IV Heparin, add Plavix and Imdur. They also recommended Heme/Onc consult due to thrombocytopenia and need for Plavix. Heme/Onc recommended platelet blue top which showed platelets 81 and peripheral smear. Iron studies, folate pending. Abdominal u/s ordered 2/2 thrombocytopenia and elevated LFTs- r/o Cirrhosis and splenomegaly. Hepatitis panel still pending.   Possible discharge tomorrow after HD to nursing home if heme/onc w/u stable.       Interval History: see hospital course     Review of Systems   Constitutional:  Positive for activity change, appetite change and fatigue. Negative for chills, diaphoresis, fever and unexpected weight change.   HENT:  Negative for congestion, nosebleeds, sinus pressure and sore throat.    Eyes:  Positive for visual disturbance. Negative for pain and discharge.   Respiratory:  Negative for cough, chest tightness, wheezing and stridor.    Cardiovascular:  Negative for chest pain, palpitations and leg swelling.   Gastrointestinal:  Negative for abdominal distention, abdominal pain, blood in stool, constipation, diarrhea, nausea and vomiting.   Endocrine: Negative for cold intolerance and heat intolerance.   Genitourinary:  Negative for difficulty urinating, dysuria, flank pain, frequency and urgency.   Musculoskeletal:  Negative for arthralgias, back pain, joint swelling,  myalgias, neck pain and neck stiffness.   Skin:  Negative for rash and wound.   Allergic/Immunologic: Negative for food allergies and immunocompromised state.   Neurological:  Positive for weakness (generalized). Negative for dizziness, seizures, syncope, facial asymmetry, speech difficulty, light-headedness, numbness and headaches.   Hematological:  Negative for adenopathy.   Psychiatric/Behavioral:  Negative for agitation, confusion and hallucinations.    Objective:     Vital Signs (Most Recent):  Temp: 98.7 °F (37.1 °C) (03/17/22 1255)  Pulse: 76 (03/17/22 1341)  Resp: 18 (03/17/22 1341)  BP: (!) 134/59 (03/17/22 1255)  SpO2: 97 % (03/17/22 1341) Vital Signs (24h Range):  Temp:  [97.6 °F (36.4 °C)-99.3 °F (37.4 °C)] 98.7 °F (37.1 °C)  Pulse:  [68-80] 76  Resp:  [16-18] 18  SpO2:  [92 %-100 %] 97 %  BP: (109-148)/(55-62) 134/59     Weight: 68.4 kg (150 lb 12.7 oz)  Body mass index is 22.93 kg/m².    Intake/Output Summary (Last 24 hours) at 3/17/2022 1529  Last data filed at 3/17/2022 0835  Gross per 24 hour   Intake 914.87 ml   Output --   Net 914.87 ml        Physical Exam  Vitals and nursing note reviewed.   Constitutional:       General: She is not in acute distress.     Appearance: She is well-developed. She is not diaphoretic.   HENT:      Head: Normocephalic and atraumatic.      Nose: Nose normal.   Eyes:      General: No scleral icterus.     Conjunctiva/sclera: Conjunctivae normal.   Neck:      Vascular: JVD present.      Trachea: No tracheal deviation.   Cardiovascular:      Rate and Rhythm: Normal rate and regular rhythm.      Heart sounds: Normal heart sounds. No murmur heard.    No friction rub. No gallop.   Pulmonary:      Effort: Pulmonary effort is normal. No respiratory distress.      Breath sounds: No stridor. Rales present. No wheezing.   Chest:      Chest wall: No tenderness.   Abdominal:      General: Bowel sounds are normal. There is no distension.      Palpations: Abdomen is soft. There is no  mass.      Tenderness: There is no abdominal tenderness. There is no guarding or rebound.   Musculoskeletal:         General: No tenderness or deformity. Normal range of motion.      Cervical back: Normal range of motion and neck supple.      Right lower leg: Edema present.      Left lower leg: Edema present.   Skin:     General: Skin is warm and dry.      Coloration: Skin is not pale.      Findings: No erythema or rash.   Neurological:      Mental Status: She is alert and oriented to person, place, and time.      Cranial Nerves: No cranial nerve deficit.      Motor: No abnormal muscle tone.      Coordination: Coordination normal.   Psychiatric:         Behavior: Behavior normal.         Thought Content: Thought content normal.       Significant Labs: All pertinent labs within the past 24 hours have been reviewed.    Significant Imaging: I have reviewed all pertinent imaging results/findings within the past 24 hours.      Assessment/Plan:      NSTEMI (non-ST elevated myocardial infarction)  Serial troponin   IV heparin, ASA, BB  No statin 2/2 elevated LFTs  Consult Cardiology   Echo     3/16/22: Cardiology evlauated pt and recommended medical management for now- cont IV heparin, BB, ASA    3/17/22: Cardiology recommended d/c IV Heparin, add Plavix and Imdur. They also recommended Heme/Onc consult due to thrombocytopenia and need for Plavix. Jessica lso need Statin if elevated LFts resolves. OP f/u    Thrombocytopenia  3/17/22: cardiology recommended Heme/Onc consult due to thrombocytopenia and need for Plavix. Heme/Onc recommended platelet blue top which showed platelets 81 and peripheral smear-pending. Iron studies, folate pending.   Abdominal u/s ordered 2/2 thrombocytopenia and elevated LFTs- r/o Cirrhosis and splenomegaly. Hepatitis panel still pending.   Possible discharge tomorrow back to nursing home if heme/onc w/u stable.       Elevated LFTs  Viral hepatitis panel pending   Abd U/S pending   Avoid hepatoxic  medications   Monitor       Acute hypoxemic respiratory failure  Patient with Hypoxic Respiratory failure which is Acute.  she is not on home oxygen. Supplemental oxygen was provided and noted- Oxygen Concentration (%):  [28] 28.   Signs/symptoms of respiratory failure include- increased work of breathing. Contributing diagnoses includes - CHF /Pulmonary edema Labs and images were reviewed. Patient Has recent ABG, which has been reviewed. Will treat underlying causes and adjust management of respiratory failure      Improved after HD- weaned to 2 liters NC    Diastolic CHF, acute on chronic  Patient is identified as having Diastolic (HFpEF) heart failure that is Acute on chronic. CHF is currently uncontrolled due to Rales/crackles on pulmonary exam and Pulmonary edema/pleural effusion on CXR. Latest ECHO performed and demonstrates- No results found for this or any previous visit.  . Continue Beta Blocker and ARNI and monitor clinical status closely. Monitor on telemetry. Patient is on CHF pathway.  Monitor strict Is&Os and daily weights.  Place on fluid restriction of 2 L. Continue to stress to patient importance of self efficacy and  on diet for CHF. Last BNP reviewed- and noted below   Recent Labs   Lab 03/15/22  1304   BNP 4,245*   .  Ultrafiltration with HD    Renovascular hypertension  BP variable   Cont BB  Hydralazine prn       CRLD (chronic restrictive lung disease)  ADELIA and ICS neb txs       DM (diabetes mellitus)  Hypoglycemia resolved  NISS      End stage renal disease  Nephrology consulted   received urgent HD on day of admission   Continue scheduled HD        VTE Risk Mitigation (From admission, onward)         Ordered     Reason for No Pharmacological VTE Prophylaxis  Once        Question:  Reasons:  Answer:  IV Heparin w/in 24 hrs. Pre or Post-Op    03/15/22 1734     IP VTE HIGH RISK PATIENT  Once         03/15/22 1734     Place sequential compression device  Until discontinued          03/15/22 1734                Discharge Planning   JEFF:      Code Status: Full Code   Is the patient medically ready for discharge?:     Reason for patient still in hospital (select all that apply): Patient trending condition  Discharge Plan A: Return to nursing home                  Gayatri Bennett NP  Department of Hospital Medicine   [facility] ([facility])

## 2022-03-17 NOTE — ASSESSMENT & PLAN NOTE
-Troponin 5.194>9.777>13.158  -Patient remains altered on exam  -No documented complaints of chest pain   -Continue OMT-ASA, BB, heparin gtt  -Statin held given bumped LFT's  -Check echo  -Conservative med mgmt for now, will consider ischemic workup pending improvement in mental status    3/17/22  -Troponin trending down  -Patient remains chest pain free, no other CV complaints  -Discussed med management vs invasive ischemic workup  -Patient wishes to proceed with med management at this time, no family at bedside during exam, discussed with hospital medicine  -Continue ASA, BB  -Add Imdur  -Resume statin once LFT's normalize  -Add Plavix if platelet count stabilizes

## 2022-03-17 NOTE — SUBJECTIVE & OBJECTIVE
Review of Systems   Constitutional: Positive for malaise/fatigue.   HENT: Negative.     Eyes: Negative.    Cardiovascular: Negative.    Respiratory: Negative.     Endocrine: Negative.    Hematologic/Lymphatic: Negative.    Skin: Negative.    Musculoskeletal: Negative.    Gastrointestinal: Negative.    Genitourinary: Negative.    Neurological: Negative.    Psychiatric/Behavioral: Negative.     Allergic/Immunologic: Negative.    Objective:     Vital Signs (Most Recent):  Temp: 97.6 °F (36.4 °C) (03/17/22 0747)  Pulse: 70 (03/17/22 0747)  Resp: 18 (03/17/22 0747)  BP: (!) 148/61 (03/17/22 0747)  SpO2: 99 % (03/17/22 0747)   Vital Signs (24h Range):  Temp:  [97.6 °F (36.4 °C)-99.3 °F (37.4 °C)] 97.6 °F (36.4 °C)  Pulse:  [67-80] 70  Resp:  [16-18] 18  SpO2:  [92 %-100 %] 99 %  BP: (109-148)/(55-62) 148/61     Weight: 68.4 kg (150 lb 12.7 oz)  Body mass index is 22.93 kg/m².     SpO2: 99 %  O2 Device (Oxygen Therapy): nasal cannula      Intake/Output Summary (Last 24 hours) at 3/17/2022 1249  Last data filed at 3/17/2022 0835  Gross per 24 hour   Intake 914.87 ml   Output --   Net 914.87 ml       Lines/Drains/Airways       Central Venous Catheter Line  Duration                  Hemodialysis AV Graft Right thigh -- days              Drain  Duration                  Hemodialysis AV Fistula Right thigh -- days              Peripheral Intravenous Line  Duration                  Peripheral IV - Single Lumen 03/15/22 1920 20 G Right Antecubital 1 day         Peripheral IV - Single Lumen 03/16/22 1836 Distal;Left;Posterior Forearm <1 day                    Physical Exam  Vitals and nursing note reviewed.   Constitutional:       General: She is not in acute distress.     Appearance: She is well-developed. She is ill-appearing. She is not diaphoretic.      Comments:  On supplemental O2   HENT:      Head: Normocephalic and atraumatic.   Eyes:      General:         Right eye: No discharge.         Left eye: No discharge.       Pupils: Pupils are equal, round, and reactive to light.   Neck:      Thyroid: No thyromegaly.      Vascular: No JVD.      Trachea: No tracheal deviation.      Comments: +JVD    Cardiovascular:      Rate and Rhythm: Normal rate and regular rhythm.      Heart sounds: Normal heart sounds, S1 normal and S2 normal. No murmur heard.  Pulmonary:      Effort: Pulmonary effort is normal. No respiratory distress.      Breath sounds: No wheezing.      Comments: Diminished BS at bases  Abdominal:      General: There is no distension.      Palpations: Abdomen is soft.      Tenderness: There is no rebound.   Musculoskeletal:      Cervical back: Neck supple.      Right lower leg: Edema present.      Left lower leg: Edema present.   Skin:     General: Skin is warm and dry.      Findings: No erythema.      Comments: Dry skin BLE     Neurological:      General: No focal deficit present.      Mental Status: She is alert.      Comments: Oriented to person place   Psychiatric:         Mood and Affect: Mood normal.         Behavior: Behavior normal.         Thought Content: Thought content normal.       Significant Labs: CMP   Recent Labs   Lab 03/15/22  1304 03/16/22  0539 03/17/22  0507    137 134*   K 4.7 4.3 4.3    100 99   CO2 22* 23 23   * 84 128*   BUN 30* 28* 40*   CREATININE 5.6* 4.9* 6.0*   CALCIUM 8.5* 8.3* 7.6*   PROT 7.6 7.4 6.6   ALBUMIN 3.1* 3.0* 2.7*   BILITOT 0.8 0.6 0.5   ALKPHOS 165* 159* 132   * 296* 168*   ALT 87* 237* 208*   ANIONGAP 15 14 12   ESTGFRAFRICA 8* 9* 7*   EGFRNONAA 7* 8* 6*   , CBC   Recent Labs   Lab 03/16/22  0539 03/17/22  0347 03/17/22  0759   WBC 8.01 4.74 6.02   HGB 10.3* 7.4* 8.8*   HCT 34.2* 26.0* 29.1*   PLT 91* 58* 92*   , Troponin   Recent Labs   Lab 03/16/22  1023 03/16/22  1644 03/16/22 2017   TROPONINI 13.713* 11.450* 11.576*   , and All pertinent lab results from the last 24 hours have been reviewed.    Significant Imaging: Echocardiogram: Transthoracic echo  (TTE) complete (Cupid Only):   Results for orders placed or performed during the hospital encounter of 03/15/22   Echo   Result Value Ref Range    BSA 1.75 m2    TDI SEPTAL 0.06 m/s    LV LATERAL E/E' RATIO 12.00 m/s    LV SEPTAL E/E' RATIO 20.00 m/s    LA WIDTH 3.10 cm    IVC diameter 2.14 cm    Left Ventricular Outflow Tract Mean Velocity 0.10496288518806 cm/s    Left Ventricular Outflow Tract Mean Gradient 2.27 mmHg    TDI LATERAL 0.10 m/s    LVIDd 4.10 3.5 - 6.0 cm    IVS 1.54 (A) 0.6 - 1.1 cm    Posterior Wall 1.77 (A) 0.6 - 1.1 cm    Ao root annulus 3.37 cm    LVIDs 2.76 2.1 - 4.0 cm    FS 33 28 - 44 %    LA volume 35.01 cm3    Sinus 3.17 cm    STJ 3.09 cm    Ascending aorta 3.21 cm    LV mass 281.77 g    LA size 4.03 cm    RVDD 4.34 cm    TAPSE 1.62 cm    Left Ventricle Relative Wall Thickness 0.86 cm    AV mean gradient 6 mmHg    AV valve area 1.91 cm2    AV Velocity Ratio 0.64     AV index (prosthetic) 0.65     MV valve area p 1/2 method 4.57 cm2    E/A ratio 0.90     Mean e' 0.08 m/s    E wave deceleration time 166.114385388332546 msec    IVRT 71.158659717304688 msec    LVOT diameter 1.93 cm    LVOT area 2.9 cm2    LVOT peak gino 0.96 m/s    LVOT peak VTI 24.00 cm    Ao peak gino 1.51 m/s    Ao VTI 36.8 cm    RVOT peak gino 0.38 m/s    RVOT peak VTI 8.2 cm    LVOT stroke volume 70.18 cm3    AV peak gradient 9 mmHg    PV mean gradient 0.36 mmHg    E/E' ratio 15.00 m/s    MV Peak E Gino 1.20 m/s    TR Max Gino 3.24 m/s    MV stenosis pressure 1/2 time 48.327528070824298 ms    MV Peak A Gino 1.33 m/s    LV Systolic Volume 28.51 mL    LV Systolic Volume Index 16.2 mL/m2    LV Diastolic Volume 74.29 mL    LV Diastolic Volume Index 42.21 mL/m2    LA Volume Index 19.9 mL/m2    LV Mass Index 160 g/m2    Echo EF Estimated 62 %    RA Major Axis 4.34 cm    Left Atrium Minor Axis 3.02 cm    Left Atrium Major Axis 3.63 cm    Triscuspid Valve Regurgitation Peak Gradient 42 mmHg    RA Width 4.39 cm    EF 60 %    Narrative    ·  Concentric hypertrophy and normal systolic function.  · The estimated ejection fraction is 60%.  · Grade II left ventricular diastolic dysfunction.  · With normal right ventricular systolic function.  · Mild to moderate tricuspid regurgitation.  · There is pulmonary hypertension.      , EKG: Reviewed, and X-Ray: CXR: X-Ray Chest 1 View (CXR): No results found for this visit on 03/15/22. and X-Ray Chest PA and Lateral (CXR): No results found for this visit on 03/15/22.

## 2022-03-17 NOTE — ASSESSMENT & PLAN NOTE
--plts ranging from 58k to 92k during current admission; no bruising/bleeding noted, no blood in urine  --patient poor historian due to dementia; no family at bedside; patient is resident of NH  --Will investigate with peripheral smear, iron studies, B12 & folate levels

## 2022-03-17 NOTE — ASSESSMENT & PLAN NOTE
Serial troponin   IV heparin, ASA, BB  No statin 2/2 elevated LFTs  Consult Cardiology   Echo     3/16/22: Cardiology evlauated pt and recommended medical management for now- cont IV heparin, BB, ASA    3/17/22: Cardiology recommended d/c IV Heparin, add Plavix and Imdur. They also recommended Heme/Onc consult due to thrombocytopenia and need for Plavix. Jessica lso need Statin if elevated LFts resolves. OP f/u

## 2022-03-17 NOTE — ASSESSMENT & PLAN NOTE
Patient with Hypoxic Respiratory failure which is Acute.  she is not on home oxygen. Supplemental oxygen was provided and noted- Oxygen Concentration (%):  [28] 28.   Signs/symptoms of respiratory failure include- increased work of breathing. Contributing diagnoses includes - CHF /Pulmonary edema Labs and images were reviewed. Patient Has recent ABG, which has been reviewed. Will treat underlying causes and adjust management of respiratory failure      Improved after HD- weaned to 2 liters NC

## 2022-03-17 NOTE — HOSPITAL COURSE
3/17/22-Patient seen and examined today, sitting up in bed. Much more awake and alert. States she feels well. No CV complaints. Labs reviewed, platelets have been persistently low, hematology consulted. Troponin trending down. Echo showed normal EF, no WMA noted. Discussed med management vs invasive ischemic workup with patient (she wants medical management). Labs reviewed.

## 2022-03-17 NOTE — PLAN OF CARE
[facility] - Telemetry (Hospital)  Initial Discharge Assessment       Primary Care Provider: Garry Vazquez MD    Admission Diagnosis: CHF (congestive heart failure) [I50.9]  Weakness [R53.1]  ESRD on dialysis [N18.6, Z99.2]  NSTEMI (non-ST elevated myocardial infarction) [I21.4]  Pulmonary edema with congestive heart failure [I50.1]  Acute cystitis without hematuria [N30.00]  History of dementia [Z86.59]  Acute hypoxemic respiratory failure [J96.01]    Admission Date: 3/15/2022  Expected Discharge Date:     Discharge Barriers Identified: None    Payor: MEDICARE / Plan: MEDICARE PART A & B / Product Type: Government /     Extended Emergency Contact Information  Primary Emergency Contact: Charley Cabrales  Home Phone: [phone]  Mobile Phone: [phone]  Relation: Daughter   needed? No  Secondary Emergency Contact: Joe Wilder   [facility]  Home Phone: [phone]  Mobile Phone: [phone]  Relation: Daughter    Discharge Plan A: Return to nursing home         [facility] [address]  [address]  [address]  [place] [address]  Phone: [phone] Fax: [phone]    [facility] [address]  [address] [place] [address]  Phone: [phone] Fax: [phone]      Initial Assessment (most recent)     Adult Discharge Assessment - 03/17/22 1418        Discharge Assessment    Assessment Type Discharge Planning Assessment     Confirmed/corrected address, phone number and insurance Yes     Confirmed Demographics Correct on Facesheet     Source of Information health record     Reason For Admission Encephalopathy, metabolic     Lives With facility resident     Facility Arrived From: [facility]     Do you expect to return to your current living situation? Yes     Do you have help at home or someone to help you manage your care at home? Yes     Who are your caregiver(s) and their phone number(s)?  facility staff     Prior to hospitilization cognitive status: Alert/Oriented     Current cognitive status: Unable to Assess     Walking or Climbing Stairs Difficulty ambulation difficulty, requires equipment     Mobility Management WC     Dressing/Bathing Difficulty bathing difficulty, assistance 1 person     Home Accessibility wheelchair accessible     Home Layout Able to live on 1st floor     Equipment Currently Used at Home wheelchair     Readmission within 30 days? No     Patient currently being followed by outpatient case management? No     Do you currently have service(s) that help you manage your care at home? No     Do you take prescription medications? Yes     Do you have prescription coverage? Yes     Do you have any problems affording any of your prescribed medications? No     Is the patient taking medications as prescribed? yes     Who is going to help you get home at discharge? facility staff     How do you get to doctors appointments? health plan transportation     Are you on dialysis? No     Do you take coumadin? No     Discharge Plan A Return to nursing home     DME Needed Upon Discharge  none     Discharge Plan discussed with: Patient;Adult children     Discharge Barriers Identified None               Anticipated DC dispo: return to nursing home ([facility])  Prior Level of Function: dependent, ambulates using a WC     Comments:  CM met with patient at bedside to introduce role and discuss discharge planning. Patient is a resident of [facility]. Facility staff  will be help at home and can provide transport at time of discharge. CM discharge needs depends on hospital progress. CM will continue following to assist with other needs.

## 2022-03-17 NOTE — HPI
This is a 81 y.o. female patient with a PMHx of anemia, dementia, DM, dysphagia, end stage renal disease on dialysis, HTN, neuropathy, right eye blindness, GERD, bipolar, remote history of breast cancer, TB, MRSA bacteremia with mitral vegetation, and pneumonia who presents to [facility] ER for hypoglycemia. Per [facility], the pt was found unresponsive and hypoxemic at [facility] with a CBG of 40. At her nursing home, [facility] reports the pt was put on a non-rebreather mask and given 2 mg glucagon IM. [facility] put the pt on nasal cannula O2 and administered 1 amp, hanging IVPB D50 and 25 D50W. [facility] reports the last CBG they recorded was 143.     On arrival to ED, pt was on 2 liters NC, CT head showed nothing acute. CXR showed pulmonary edema. EKG showed NSR with 1 st degree AV block, RBB, nonspecific changes. Troponin 5.1. BNP 4295. WBC 13, Mildy elevated LFTs, INR 1.4, +UTI. Glucose 143.    Pt is drowsy, disoriented x 3, arouses to tactile stimulation, and only mumbles incomprehensible words. Nephrology and cardiology consulted    Her plts have been consistently low during this admit, between 58k & 92k. Hematology/Oncology consulted for anemia and work up of thrombocytopenia.

## 2022-03-17 NOTE — PLAN OF CARE
ST completed swallow evaluation with pt exhibiting no overt signs of aspiration.  She endorses dysphagia with meats and rice and stated that she only meat she will eat is fish.  ST recommends a po diet soft mechanical with chopped meats in gravy with no rice or cornbread and thin liquids.

## 2022-03-17 NOTE — PLAN OF CARE
Pt occasionally opens eyes spontaneously and occasionally awakens to voice depending on time of day; disoriented to time and situation. Slurred, sometimes incomprehensible, speech. NSR on the heart monitor. On 2 L NC; tolerating well. Pt turned and repositioned with use of pillows; in chair for part of today. New 20 G PIV in L forearm and 20 G in R AC CDI with no redness, swelling, warmth, or drainage. Blood sugar monitored. Diabetes education provided. Bed low, wheels locked, alarms audible. Plan of care reviewed. Vital signs monitored. Pain assessed. Safety promoted. Infection risks reduced.

## 2022-03-17 NOTE — SUBJECTIVE & OBJECTIVE
Interval History: Pt was seen and examined. Labs and meds reviewed. Discussed with other providers. Pt denies SOB. Offered HD to pt as her last HD was 2 days ago. Pt declined. Says would like to go back to the NH.    Review of patient's allergies indicates:  No Known Allergies  Current Facility-Administered Medications   Medication Frequency    acetaminophen tablet 650 mg Q4H PRN    albuterol-ipratropium 2.5 mg-0.5 mg/3 mL nebulizer solution 3 mL Q6H PRN    albuterol-ipratropium 2.5 mg-0.5 mg/3 mL nebulizer solution 3 mL Q6H    aluminum-magnesium hydroxide-simethicone 200-200-20 mg/5 mL suspension 30 mL QID PRN    aspirin chewable tablet 81 mg Daily    budesonide nebulizer solution 0.5 mg Q12H    carvediloL tablet 3.125 mg BID WM    clopidogreL tablet 75 mg Daily    dextrose 10% bolus 125 mL PRN    dextrose 10% bolus 250 mL PRN    docusate sodium capsule 100 mg Daily    gabapentin capsule 100 mg QHS    glucagon (human recombinant) injection 1 mg PRN    glucose chewable tablet 16 g PRN    glucose chewable tablet 24 g PRN    hydrALAZINE injection 10 mg Q6H PRN    insulin aspart U-100 pen 0-5 Units QID (AC + HS) PRN    isosorbide mononitrate 24 hr tablet 30 mg Daily    magnesium oxide tablet 800 mg PRN    magnesium oxide tablet 800 mg PRN    melatonin tablet 6 mg Nightly PRN    metronidazole 1% gel BID    miconazole nitrate 2% ointment BID    mupirocin 2 % ointment BID    naloxone 0.4 mg/mL injection 0.02 mg PRN    ondansetron injection 4 mg Q6H PRN    pantoprazole EC tablet 40 mg BID    polyethylene glycol packet 17 g Daily    potassium bicarbonate disintegrating tablet 35 mEq PRN    potassium bicarbonate disintegrating tablet 50 mEq PRN    potassium bicarbonate disintegrating tablet 60 mEq PRN    potassium, sodium phosphates 280-160-250 mg packet 2 packet PRN    potassium, sodium phosphates 280-160-250 mg packet 2 packet PRN    potassium, sodium phosphates 280-160-250 mg packet 2 packet PRN    prochlorperazine  injection Soln 5 mg Q6H PRN    simethicone chewable tablet 80 mg QID PRN    sodium chloride 0.9% flush 10 mL Q8H PRN    traMADoL tablet 50 mg Q12H PRN       Objective:     Vital Signs (Most Recent):  Temp: 98.7 °F (37.1 °C) (03/17/22 1255)  Pulse: 70 (03/17/22 1255)  Resp: 18 (03/17/22 1255)  BP: (!) 134/59 (03/17/22 1255)  SpO2: 98 % (03/17/22 1255)  O2 Device (Oxygen Therapy): nasal cannula (03/17/22 0736)   Vital Signs (24h Range):  Temp:  [97.6 °F (36.4 °C)-99.3 °F (37.4 °C)] 98.7 °F (37.1 °C)  Pulse:  [67-80] 70  Resp:  [16-18] 18  SpO2:  [92 %-100 %] 98 %  BP: (109-148)/(55-62) 134/59     Weight: 68.4 kg (150 lb 12.7 oz) (03/17/22 0405)  Body mass index is 22.93 kg/m².  Body surface area is 1.81 meters squared.    I/O last 3 completed shifts:  In: 895.8 [P.O.:250; I.V.:624.7; IV Piggyback:21.1]  Out: 0     Physical Exam  Vitals and nursing note reviewed.   Constitutional:       Appearance: Normal appearance.   Cardiovascular:      Rate and Rhythm: Normal rate and regular rhythm.      Heart sounds:     No friction rub.   Pulmonary:      Effort: Pulmonary effort is normal. No respiratory distress.      Breath sounds: Rales present.   Abdominal:      Palpations: Abdomen is soft.      Tenderness: There is no abdominal tenderness.   Musculoskeletal:      Right lower leg: Edema present.      Left lower leg: Edema present.   Neurological:      Mental Status: She is alert.       Significant Labs: reviewed  BMP  Lab Results   Component Value Date     (L) 03/17/2022    K 4.3 03/17/2022    CL 99 03/17/2022    CO2 23 03/17/2022    BUN 40 (H) 03/17/2022    CREATININE 6.0 (H) 03/17/2022    CALCIUM 7.6 (L) 03/17/2022    ANIONGAP 12 03/17/2022    ESTGFRAFRICA 7 (A) 03/17/2022    EGFRNONAA 6 (A) 03/17/2022     Lab Results   Component Value Date    WBC 6.02 03/17/2022    HGB 8.8 (L) 03/17/2022    HCT 29.1 (L) 03/17/2022     (H) 03/17/2022    PLT 92 (L) 03/17/2022       Recent Labs   Lab 03/16/22  2017   TROPONINI  11.576*         Significant Imaging: reviewed

## 2022-03-17 NOTE — PT/OT/SLP EVAL
Speech Language Pathology Evaluation  Bedside Swallow    Patient Name:  Stephanie Wilder   MRN:  [id number]  Admitting Diagnosis: Encephalopathy, metabolic    Recommendations:                 General Recommendations:  Dysphagia therapy  Diet recommendations:  Mechanical soft, Chopped meat, No Rice, Thin   Aspiration Precautions: 1 bite/sip at a time, Assistance with meals, Eliminate distractions, Feed only when awake/alert, HOB to 90 degrees, Meds crushed in puree, Remain upright 30 minutes post meal and Small bites/sips   General Precautions: Standard, vision impaired, fall, aspiration  Communication strategies:  none    History:     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  ST completed evaluation with pt awake, alert, and participatory with no family present.  She is agreeable to ST and is without complaint.   Patient goals: none stated      Pain/Comfort:  · Pain Rating 1: 0/10    Respiratory Status: Nasal cannula    Objective:     Oral Musculature Evaluation  · Oral Musculature: general weakness  · Dentition: edentulous    Bedside Swallow Eval:   Consistencies Assessed:  · Thin liquids and soft solids      Oral Phase:   · Pt with excessive chewing and oral residue that she was able to clear with a liquid wash    Pharyngeal Phase:   · delayed swallow initation  · no overt clinical signs/symptoms of aspiration    Treatment: Pt is blind in the right eye and reports being able to see shapes out of her left eye.  She requests that she be fed by staff.  No  overt signs of aspiration visualized with tested consistencies.  ST will provide skilled meal monitoring for ongoing swallow assessment and cueing for safe swallow strategies.     Assessment:     Stephanie Wilder is a 81 y.o. female with an SLP diagnosis of Dysphagia.  She presents with decreased oral strength/ROM and decreased swallow safety.  She will benefit from ongoing ST for dysphagia.     Goals:   Multidisciplinary Problems     SLP Goals        Problem: SLP Goal    Goal Priority Disciplines Outcome   SLP Goal     SLP Ongoing, Progressing   Description: 1. Pt will tolerate least restrictive diet without incidence                    Plan:     · Patient to be seen:  2 x/week   · Plan of Care expires:  03/24/22  · Plan of Care reviewed with:  patient   · SLP Follow-Up:  Yes       Time Tracking:     SLP Treatment Date:   03/17/22  Speech Start Time:  1000  Speech Stop Time:  1020     Speech Total Time (min):  20 min    Billable Minutes: Speech Therapy Individual 20    03/17/2022

## 2022-03-17 NOTE — SUBJECTIVE & OBJECTIVE
Oncology Treatment Plan:   [Could not find a treatment plan. This SmartLink may be configured incorrectly. Contact a  for help.]    Medications:  Continuous Infusions:  Scheduled Meds:   albuterol-ipratropium  3 mL Nebulization Q6H    aspirin  81 mg Oral Daily    budesonide  0.5 mg Nebulization Q12H    carvediloL  3.125 mg Oral BID WM    clopidogreL  75 mg Oral Daily    docusate sodium  100 mg Oral Daily    gabapentin  100 mg Oral QHS    isosorbide mononitrate  30 mg Oral Daily    metronidazole 1%   Topical (Top) BID    miconazole nitrate 2%   Topical (Top) BID    mupirocin   Nasal BID    pantoprazole  40 mg Oral BID    polyethylene glycol  17 g Oral Daily     PRN Meds:acetaminophen, albuterol-ipratropium, aluminum-magnesium hydroxide-simethicone, dextrose 10%, dextrose 10%, glucagon (human recombinant), glucose, glucose, hydrALAZINE, insulin aspart U-100, magnesium oxide, magnesium oxide, melatonin, naloxone, ondansetron, potassium bicarbonate, potassium bicarbonate, potassium bicarbonate, potassium, sodium phosphates, potassium, sodium phosphates, potassium, sodium phosphates, prochlorperazine, simethicone, sodium chloride 0.9%, traMADoL     Review of patient's allergies indicates:  No Known Allergies     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   Objective:     Vital Signs (Most Recent):  Temp: 97.6 °F (36.4 °C) (03/17/22 0747)  Pulse: 70 (03/17/22 0747)  Resp: 18 (03/17/22 0747)  BP: (!) 148/61 (03/17/22 0747)  SpO2: 99 % (03/17/22 0747)   Vital Signs (24h Range):  Temp:  [97.6 °F (36.4 °C)-99.3 °F (37.4 °C)] 97.6 °F (36.4 °C)  Pulse:  [67-80] 70  Resp:  [16-18] 18  SpO2:  [92 %-100 %] 99 %  BP: (109-148)/(55-62) 148/61     Weight: 68.4 kg (150 lb 12.7 oz)  Body mass index is 22.93 kg/m².  Body surface area is 1.81 meters squared.      Intake/Output Summary (Last 24 hours) at 3/17/2022 1210  Last data filed at 3/17/2022 0835  Gross per 24 hour   Intake 914.87 ml   Output --   Net 914.87 ml       Physical Exam  Vitals reviewed.       Significant Labs:   BMP:   Recent Labs   Lab 03/15/22  1304 03/16/22  0539 03/17/22  0507   * 84 128*    137 134*   K 4.7 4.3 4.3    100 99   CO2 22* 23 23   BUN 30* 28* 40*   CREATININE 5.6* 4.9* 6.0*   CALCIUM 8.5* 8.3* 7.6*   MG 2.1  --   --    , CBC:   Recent Labs   Lab 03/16/22  0539 03/17/22  0347 03/17/22  0759   WBC 8.01 4.74 6.02   HGB 10.3* 7.4* 8.8*   HCT 34.2* 26.0* 29.1*   PLT 91* 58* 92*   , LFTs:   Recent Labs   Lab 03/15/22  1304 03/16/22  0539 03/17/22  0507   ALT 87* 237* 208*   * 296* 168*   ALKPHOS 165* 159* 132   BILITOT 0.8 0.6 0.5   PROT 7.6 7.4 6.6   ALBUMIN 3.1* 3.0* 2.7*   , Urine Studies:   Recent Labs   Lab 03/15/22  1305   COLORU Other*   APPEARANCEUA Cloudy*   PHUR SEE COMMENT   SPECGRAV SEE COMMENT   PROTEINUA SEE COMMENT   GLUCUA SEE COMMENT   KETONESU SEE COMMENT   BILIRUBINUA SEE COMMENT   OCCULTUA SEE COMMENT   NITRITE SEE COMMENT   UROBILINOGEN SEE COMMENT   LEUKOCYTESUR SEE COMMENT   WBCUA >100*   , and All pertinent labs from the last 24  hours have been reviewed.    Diagnostic Results:  I have reviewed all pertinent imaging results/findings within the past 24 hours.

## 2022-03-17 NOTE — PLAN OF CARE
Pt disoriented x time.  VSS.  Pt remained afebrile throughout this shift.   All meds administered per order.   Pt remained free of falls this shift.   Plan of care reviewed. Patient verbalizes understanding.   Pt moving/turning independently.   Patient NSR on monitor.   Bed low, side rails up x 2, wheels locked, call light in reach.   Patient instructed to call for assistance.  Will continue to monitor.

## 2022-03-17 NOTE — PROGRESS NOTES
[facility] ([facility])  Cardiology  Progress Note    Patient Name: Stephanie Wilder  MRN: [id number]  Admission Date: 3/15/2022  Hospital Length of Stay: 1 days  Code Status: Full Code   Attending Physician: Romelia Brunner MD   Primary Care Physician: Garry Vazquez MD  Expected Discharge Date:   Principal Problem:Encephalopathy, metabolic    Subjective:   HPI:  HPI obtained from chart as patient lethargic during exam     Stephanie Wilder is a 81 y.o. female with current medical conditions including DM, HTN, ESRD on HD, GERD, breast cancer, TB, cognitive impairment, hx of MRSA bacteremia with mitral vegetation who presented to the ED 1 day ago after being found unresponsive at [facility]. Per EMS report patient was hypoxemic and hypoglycemic with BS in the 40s. Upon arrival to the ED the patients CT head negative for acute abnormalities, CXR with pulmonary edema. EKG with SR, 1st AV block, RBBB, T wave inversions anteriorly. Cardiology consulted, patient seen and examined. Remains lethargic and confused, unresponsive to verbal stimuli. Would occasionally mumble incomprehensible words. Labs reviewed, troponin 5.1 >> 5.6, BNP 4245, WBC 9.77>>13.54, Cr 5.6 >> 4.9, AST//237 and found to have a UTI on UA.  Echo pending. Would recommend continuing conservative cardio management for now given her AMS.       Hospital Course:   3/17/22-Patient seen and examined today, sitting up in bed. Much more awake and alert. States she feels well. No CV complaints. Labs reviewed, platelets have been persistently low, hematology consulted. Troponin trending down. Echo showed normal EF, no WMA noted. Discussed med management vs invasive ischemic workup with patient (she wants medical management). Labs reviewed.           Review of Systems   Constitutional: Positive for malaise/fatigue.   HENT: Negative.     Eyes: Negative.    Cardiovascular: Negative.    Respiratory: Negative.     Endocrine: Negative.    Hematologic/Lymphatic:  Negative.    Skin: Negative.    Musculoskeletal: Negative.    Gastrointestinal: Negative.    Genitourinary: Negative.    Neurological: Negative.    Psychiatric/Behavioral: Negative.     Allergic/Immunologic: Negative.    Objective:     Vital Signs (Most Recent):  Temp: 97.6 °F (36.4 °C) (03/17/22 0747)  Pulse: 70 (03/17/22 0747)  Resp: 18 (03/17/22 0747)  BP: (!) 148/61 (03/17/22 0747)  SpO2: 99 % (03/17/22 0747)   Vital Signs (24h Range):  Temp:  [97.6 °F (36.4 °C)-99.3 °F (37.4 °C)] 97.6 °F (36.4 °C)  Pulse:  [67-80] 70  Resp:  [16-18] 18  SpO2:  [92 %-100 %] 99 %  BP: (109-148)/(55-62) 148/61     Weight: 68.4 kg (150 lb 12.7 oz)  Body mass index is 22.93 kg/m².     SpO2: 99 %  O2 Device (Oxygen Therapy): nasal cannula      Intake/Output Summary (Last 24 hours) at 3/17/2022 1249  Last data filed at 3/17/2022 0835  Gross per 24 hour   Intake 914.87 ml   Output --   Net 914.87 ml       Lines/Drains/Airways       Central Venous Catheter Line  Duration                  Hemodialysis AV Graft Right thigh -- days              Drain  Duration                  Hemodialysis AV Fistula Right thigh -- days              Peripheral Intravenous Line  Duration                  Peripheral IV - Single Lumen 03/15/22 1920 20 G Right Antecubital 1 day         Peripheral IV - Single Lumen 03/16/22 1836 Distal;Left;Posterior Forearm <1 day                    Physical Exam  Vitals and nursing note reviewed.   Constitutional:       General: She is not in acute distress.     Appearance: She is well-developed. She is ill-appearing. She is not diaphoretic.      Comments:  On supplemental O2   HENT:      Head: Normocephalic and atraumatic.   Eyes:      General:         Right eye: No discharge.         Left eye: No discharge.      Pupils: Pupils are equal, round, and reactive to light.   Neck:      Thyroid: No thyromegaly.      Vascular: No JVD.      Trachea: No tracheal deviation.      Comments: +JVD    Cardiovascular:      Rate and Rhythm:  Normal rate and regular rhythm.      Heart sounds: Normal heart sounds, S1 normal and S2 normal. No murmur heard.  Pulmonary:      Effort: Pulmonary effort is normal. No respiratory distress.      Breath sounds: No wheezing.      Comments: Diminished BS at bases  Abdominal:      General: There is no distension.      Palpations: Abdomen is soft.      Tenderness: There is no rebound.   Musculoskeletal:      Cervical back: Neck supple.      Right lower leg: Edema present.      Left lower leg: Edema present.   Skin:     General: Skin is warm and dry.      Findings: No erythema.      Comments: Dry skin BLE     Neurological:      General: No focal deficit present.      Mental Status: She is alert.      Comments: Oriented to person place   Psychiatric:         Mood and Affect: Mood normal.         Behavior: Behavior normal.         Thought Content: Thought content normal.       Significant Labs: CMP   Recent Labs   Lab 03/15/22  1304 03/16/22  0539 03/17/22  0507    137 134*   K 4.7 4.3 4.3    100 99   CO2 22* 23 23   * 84 128*   BUN 30* 28* 40*   CREATININE 5.6* 4.9* 6.0*   CALCIUM 8.5* 8.3* 7.6*   PROT 7.6 7.4 6.6   ALBUMIN 3.1* 3.0* 2.7*   BILITOT 0.8 0.6 0.5   ALKPHOS 165* 159* 132   * 296* 168*   ALT 87* 237* 208*   ANIONGAP 15 14 12   ESTGFRAFRICA 8* 9* 7*   EGFRNONAA 7* 8* 6*   , CBC   Recent Labs   Lab 03/16/22  0539 03/17/22  0347 03/17/22  0759   WBC 8.01 4.74 6.02   HGB 10.3* 7.4* 8.8*   HCT 34.2* 26.0* 29.1*   PLT 91* 58* 92*   , Troponin   Recent Labs   Lab 03/16/22  1023 03/16/22  1644 03/16/22  2017   TROPONINI 13.713* 11.450* 11.576*   , and All pertinent lab results from the last 24 hours have been reviewed.    Significant Imaging: Echocardiogram: Transthoracic echo (TTE) complete ([facility] Only):   Results for orders placed or performed during the hospital encounter of 03/15/22   Echo   Result Value Ref Range    BSA 1.75 m2    TDI SEPTAL 0.06 m/s    LV LATERAL E/E' RATIO 12.00 m/s     LV SEPTAL E/E' RATIO 20.00 m/s    LA WIDTH 3.10 cm    IVC diameter 2.14 cm    Left Ventricular Outflow Tract Mean Velocity 0.26458601616562 cm/s    Left Ventricular Outflow Tract Mean Gradient 2.27 mmHg    TDI LATERAL 0.10 m/s    LVIDd 4.10 3.5 - 6.0 cm    IVS 1.54 (A) 0.6 - 1.1 cm    Posterior Wall 1.77 (A) 0.6 - 1.1 cm    Ao root annulus 3.37 cm    LVIDs 2.76 2.1 - 4.0 cm    FS 33 28 - 44 %    LA volume 35.01 cm3    Sinus 3.17 cm    STJ 3.09 cm    Ascending aorta 3.21 cm    LV mass 281.77 g    LA size 4.03 cm    RVDD 4.34 cm    TAPSE 1.62 cm    Left Ventricle Relative Wall Thickness 0.86 cm    AV mean gradient 6 mmHg    AV valve area 1.91 cm2    AV Velocity Ratio 0.64     AV index (prosthetic) 0.65     MV valve area p 1/2 method 4.57 cm2    E/A ratio 0.90     Mean e' 0.08 m/s    E wave deceleration time 166.908805121215716 msec    IVRT 71.412811568112178 msec    LVOT diameter 1.93 cm    LVOT area 2.9 cm2    LVOT peak gino 0.96 m/s    LVOT peak VTI 24.00 cm    Ao peak gino 1.51 m/s    Ao VTI 36.8 cm    RVOT peak gino 0.38 m/s    RVOT peak VTI 8.2 cm    LVOT stroke volume 70.18 cm3    AV peak gradient 9 mmHg    PV mean gradient 0.36 mmHg    E/E' ratio 15.00 m/s    MV Peak E Gino 1.20 m/s    TR Max Gino 3.24 m/s    MV stenosis pressure 1/2 time 48.504598325600412 ms    MV Peak A Gino 1.33 m/s    LV Systolic Volume 28.51 mL    LV Systolic Volume Index 16.2 mL/m2    LV Diastolic Volume 74.29 mL    LV Diastolic Volume Index 42.21 mL/m2    LA Volume Index 19.9 mL/m2    LV Mass Index 160 g/m2    Echo EF Estimated 62 %    RA Major Axis 4.34 cm    Left Atrium Minor Axis 3.02 cm    Left Atrium Major Axis 3.63 cm    Triscuspid Valve Regurgitation Peak Gradient 42 mmHg    RA Width 4.39 cm    EF 60 %    Narrative    · Concentric hypertrophy and normal systolic function.  · The estimated ejection fraction is 60%.  · Grade II left ventricular diastolic dysfunction.  · With normal right ventricular systolic function.  · Mild to  moderate tricuspid regurgitation.  · There is pulmonary hypertension.      , EKG: Reviewed, and X-Ray: CXR: X-Ray Chest 1 View (CXR): No results found for this visit on 03/15/22. and X-Ray Chest PA and Lateral (CXR): No results found for this visit on 03/15/22.    Assessment and Plan:   Patient who presents with AMS/encephalopathy, hypoglycemia, respiratory failure, NSTEMI. Much improved, back to baseline. No CV complaints. Remains chest pain free. Echo reviewed, showed normal EF, no WMA. Discussed med management vs invasive workup (LHC, stress test etc). Patient has opted for medical management (no family at bedside during exam, discussed with hospital medicine).  * Encephalopathy, metabolic  -Mgmt and workup as per primary team  -Patient noted to be hypotensive, hypoglycemic, and hypoxic by EMS    Thrombocytopenia  -Heme/onc on board, appreciate assistance      Acute cystitis without hematuria  -On abx    Elevated LFTs  -Workup and mgmt as per hospital medicine    Acute hypoxemic respiratory failure  -Fluid removal via HD    NSTEMI (non-ST elevated myocardial infarction)  -Troponin 5.194>9.777>13.158  -Patient remains altered on exam  -No documented complaints of chest pain   -Continue OMT-ASA, BB, heparin gtt  -Statin held given bumped LFT's  -Check echo  -Conservative med mgmt for now, will consider ischemic workup pending improvement in mental status    3/17/22  -Troponin trending down  -Patient remains chest pain free, no other CV complaints  -Discussed med management vs invasive ischemic workup  -Patient wishes to proceed with med management at this time, no family at bedside during exam, discussed with hospital medicine  -Continue ASA, BB  -Add Imdur  -Resume statin once LFT's normalize  -Add Plavix if platelet count stabilizes     Hypoglycemia  -Mgmt as per primary team    End stage renal disease  -HD as per nephrology        VTE Risk Mitigation (From admission, onward)         Ordered     Reason for No  Pharmacological VTE Prophylaxis  Once        Question:  Reasons:  Answer:  IV Heparin w/in 24 hrs. Pre or Post-Op    03/15/22 1734     IP VTE HIGH RISK PATIENT  Once         03/15/22 1734     Place sequential compression device  Until discontinued         03/15/22 1734                Kourtney oMrales PA-C  Cardiology  [facility] - Tele[facility] ([facility])

## 2022-03-17 NOTE — CONSULTS
[facility] - Tele[facility] ([facility])  Hematology/Oncology  Consult Note    Patient Name: Stephanie Wilder  MRN: [id number]  Admission Date: 3/15/2022  Hospital Length of Stay: 1 days  Code Status: Full Code   Attending Provider: Romelia Brunner MD  Consulting Provider: SHIRLEY Maravilla  Primary Care Physician: Garry Vazquez MD  Principal Problem:Encephalopathy, metabolic    Inpatient consult to Hematology/Oncology  Consult performed by: SHIRLEY Maravilla  Consult ordered by: Kourtney Morales PA-C        Subjective:     HPI:  This is a 81 y.o. female patient with a PMHx of anemia, dementia, DM, dysphagia, end stage renal disease on dialysis, HTN, neuropathy, right eye blindness, GERD, bipolar, remote history of breast cancer, TB, MRSA bacteremia with mitral vegetation, and pneumonia who presents to [facility] ER for hypoglycemia. Per [facility], the pt was found unresponsive and hypoxemic at [facility] with a CBG of 40. At her nursing home, [facility] reports the pt was put on a non-rebreather mask and given 2 mg glucagon IM. [facility] put the pt on nasal cannula O2 and administered 1 amp, hanging IVPB D50 and 25 D50W. [facility] reports the last CBG they recorded was 143.     On arrival to ED, pt was on 2 liters NC, CT head showed nothing acute. CXR showed pulmonary edema. EKG showed NSR with 1 st degree AV block, RBB, nonspecific changes. Troponin 5.1. BNP 4295. WBC 13, Mildy elevated LFTs, INR 1.4, +UTI. Glucose 143.    Pt is drowsy, disoriented x 3, arouses to tactile stimulation, and only mumbles incomprehensible words. Nephrology and cardiology consulted    Her plts have been consistently low during this admit, between 58k & 92k. Hematology/Oncology consulted for anemia and work up of thrombocytopenia.      Oncology Treatment Plan:   [Could not find a treatment plan. This SmartLink may be configured incorrectly. Contact a  for help.]    Medications:  Continuous Infusions:  Scheduled Meds:   albuterol-ipratropium  3 mL  Nebulization Q6H    aspirin  81 mg Oral Daily    budesonide  0.5 mg Nebulization Q12H    carvediloL  3.125 mg Oral BID WM    clopidogreL  75 mg Oral Daily    docusate sodium  100 mg Oral Daily    gabapentin  100 mg Oral QHS    isosorbide mononitrate  30 mg Oral Daily    metronidazole 1%   Topical (Top) BID    miconazole nitrate 2%   Topical (Top) BID    mupirocin   Nasal BID    pantoprazole  40 mg Oral BID    polyethylene glycol  17 g Oral Daily     PRN Meds:acetaminophen, albuterol-ipratropium, aluminum-magnesium hydroxide-simethicone, dextrose 10%, dextrose 10%, glucagon (human recombinant), glucose, glucose, hydrALAZINE, insulin aspart U-100, magnesium oxide, magnesium oxide, melatonin, naloxone, ondansetron, potassium bicarbonate, potassium bicarbonate, potassium bicarbonate, potassium, sodium phosphates, potassium, sodium phosphates, potassium, sodium phosphates, prochlorperazine, simethicone, sodium chloride 0.9%, traMADoL     Review of patient's allergies indicates:  No Known Allergies     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   Objective:     Vital Signs (Most Recent):  Temp: 97.6 °F (36.4 °C) (03/17/22 0747)  Pulse: 70 (03/17/22 0747)  Resp: 18 (03/17/22 0747)  BP: (!) 148/61 (03/17/22 0747)  SpO2: 99 % (03/17/22 0747)   Vital Signs (24h Range):  Temp:  [97.6 °F (36.4 °C)-99.3 °F (37.4 °C)] 97.6 °F (36.4 °C)  Pulse:  [67-80] 70  Resp:  [16-18] 18  SpO2:  [92 %-100 %] 99 %  BP: (109-148)/(55-62) 148/61     Weight: 68.4 kg (150 lb 12.7 oz)  Body mass index is 22.93 kg/m².  Body surface area is 1.81 meters squared.      Intake/Output Summary (Last 24 hours) at 3/17/2022 1210  Last data filed at 3/17/2022 0835  Gross per 24 hour   Intake 914.87 ml   Output --   Net 914.87 ml       Physical Exam  Vitals reviewed.       Significant Labs:   BMP:   Recent Labs   Lab 03/15/22  1304 03/16/22  0539 03/17/22  0507   * 84 128*    137 134*   K 4.7 4.3 4.3    100 99   CO2 22* 23 23   BUN 30* 28* 40*   CREATININE 5.6* 4.9* 6.0*   CALCIUM 8.5* 8.3* 7.6*   MG 2.1  --   --    , CBC:   Recent Labs   Lab 03/16/22  0539 03/17/22  0347 03/17/22  0759   WBC 8.01 4.74 6.02   HGB 10.3* 7.4* 8.8*   HCT 34.2* 26.0* 29.1*   PLT 91* 58* 92*   , LFTs:   Recent Labs   Lab 03/15/22  1304 03/16/22  0539 03/17/22  0507   ALT 87* 237* 208*   * 296* 168*   ALKPHOS 165* 159* 132   BILITOT 0.8 0.6 0.5   PROT 7.6 7.4 6.6   ALBUMIN 3.1* 3.0* 2.7*   , Urine Studies:   Recent Labs   Lab 03/15/22  1305   COLORU Other*   APPEARANCEUA Cloudy*   PHUR SEE COMMENT   SPECGRAV SEE COMMENT   PROTEINUA SEE COMMENT   GLUCUA SEE COMMENT   KETONESU SEE COMMENT   BILIRUBINUA SEE COMMENT   OCCULTUA SEE COMMENT   NITRITE SEE COMMENT   UROBILINOGEN SEE COMMENT   LEUKOCYTESUR SEE COMMENT   WBCUA >100*   , and All pertinent labs from the last 24 hours have been reviewed.    Diagnostic Results:  I have reviewed all pertinent imaging results/findings within the past 24 hours.    Assessment/Plan:     Thrombocytopenia  --plts ranging from 58k to 92k  during current admission; no bruising/bleeding noted, no blood in urine  --patient poor historian due to dementia; no family at bedside; patient is resident of NH  --Will investigate with peripheral smear, iron studies, B12 & folate levels    Acute cystitis without hematuria  --Currently on Rocephin; continue per primary team  --Urine culture pending    Elevated LFTs  --AST &  & 208, respectively  --Consider Hepatitis panel/abdominal US to investigate    NSTEMI (non-ST elevated myocardial infarction)  --Continue serial troponin, heparin drip, ASA, BB per primary team  --Cardiology consulted and following    Hypoglycemia  --Previously on D5 infusion; completed  -- today, resolved; management per primary team    End stage renal disease  --CKD on hemodialysis (TThSat); chronically anemic  --Nephrology consulted and following    Thank you for your consult. I will follow-up with patient. Please contact us if you have any additional questions.    SHIRLEY Maravilla  Hematology/Oncology  [facility] - Telemetry ([facility])

## 2022-03-18 VITALS
SYSTOLIC BLOOD PRESSURE: 116 MMHG | TEMPERATURE: 98 F | HEIGHT: 68 IN | DIASTOLIC BLOOD PRESSURE: 55 MMHG | WEIGHT: 166.44 LBS | HEART RATE: 78 BPM | OXYGEN SATURATION: 95 % | BODY MASS INDEX: 25.23 KG/M2 | RESPIRATION RATE: 20 BRPM

## 2022-03-18 LAB
ALBUMIN SERPL BCP-MCNC: 2.9 G/DL (ref 3.5–5.2)
ALP SERPL-CCNC: 143 U/L (ref 55–135)
ALT SERPL W/O P-5'-P-CCNC: 189 U/L (ref 10–44)
ANION GAP SERPL CALC-SCNC: 14 MMOL/L (ref 8–16)
AST SERPL-CCNC: 108 U/L (ref 10–40)
BASOPHILS # BLD AUTO: 0.01 K/UL (ref 0–0.2)
BASOPHILS NFR BLD: 0.2 % (ref 0–1.9)
BILIRUB SERPL-MCNC: 0.6 MG/DL (ref 0.1–1)
BUN SERPL-MCNC: 47 MG/DL (ref 8–23)
CALCIUM SERPL-MCNC: 8 MG/DL (ref 8.7–10.5)
CHLORIDE SERPL-SCNC: 99 MMOL/L (ref 95–110)
CO2 SERPL-SCNC: 22 MMOL/L (ref 23–29)
CREAT SERPL-MCNC: 7.2 MG/DL (ref 0.5–1.4)
DIFFERENTIAL METHOD: ABNORMAL
EOSINOPHIL # BLD AUTO: 0.1 K/UL (ref 0–0.5)
EOSINOPHIL NFR BLD: 2.5 % (ref 0–8)
ERYTHROCYTE [DISTWIDTH] IN BLOOD BY AUTOMATED COUNT: 15.5 % (ref 11.5–14.5)
EST. GFR  (AFRICAN AMERICAN): 6 ML/MIN/1.73 M^2
EST. GFR  (NON AFRICAN AMERICAN): 5 ML/MIN/1.73 M^2
GLUCOSE SERPL-MCNC: 107 MG/DL (ref 70–110)
HBV SURFACE AB SER QL IA: POSITIVE
HBV SURFACE AB SERPL IA-ACNC: 53 MIU/ML
HCT VFR BLD AUTO: 30.2 % (ref 37–48.5)
HGB BLD-MCNC: 9.2 G/DL (ref 12–16)
IMM GRANULOCYTES # BLD AUTO: 0.02 K/UL (ref 0–0.04)
IMM GRANULOCYTES NFR BLD AUTO: 0.4 % (ref 0–0.5)
LYMPHOCYTES # BLD AUTO: 1 K/UL (ref 1–4.8)
LYMPHOCYTES NFR BLD: 19.9 % (ref 18–48)
MCH RBC QN AUTO: 32.6 PG (ref 27–31)
MCHC RBC AUTO-ENTMCNC: 30.5 G/DL (ref 32–36)
MCV RBC AUTO: 107 FL (ref 82–98)
MONOCYTES # BLD AUTO: 0.8 K/UL (ref 0.3–1)
MONOCYTES NFR BLD: 16.7 % (ref 4–15)
NEUTROPHILS # BLD AUTO: 2.9 K/UL (ref 1.8–7.7)
NEUTROPHILS NFR BLD: 60.3 % (ref 38–73)
NRBC BLD-RTO: 0 /100 WBC
PATH REV BLD -IMP: NORMAL
PLATELET # BLD AUTO: 92 K/UL (ref 150–450)
PMV BLD AUTO: 11 FL (ref 9.2–12.9)
POCT GLUCOSE: 128 MG/DL (ref 70–110)
POCT GLUCOSE: 94 MG/DL (ref 70–110)
POTASSIUM SERPL-SCNC: 4.5 MMOL/L (ref 3.5–5.1)
PROT SERPL-MCNC: 7.1 G/DL (ref 6–8.4)
RBC # BLD AUTO: 2.82 M/UL (ref 4–5.4)
SODIUM SERPL-SCNC: 135 MMOL/L (ref 136–145)
WBC # BLD AUTO: 4.78 K/UL (ref 3.9–12.7)

## 2022-03-18 PROCEDURE — 36415 COLL VENOUS BLD VENIPUNCTURE: CPT | Performed by: NURSE PRACTITIONER

## 2022-03-18 PROCEDURE — 99900035 HC TECH TIME PER 15 MIN (STAT)

## 2022-03-18 PROCEDURE — 80053 COMPREHEN METABOLIC PANEL: CPT | Performed by: NURSE PRACTITIONER

## 2022-03-18 PROCEDURE — 90935 PR HEMODIALYSIS, ONE EVALUATION: ICD-10-PCS | Mod: ,,, | Performed by: INTERNAL MEDICINE

## 2022-03-18 PROCEDURE — 25000003 PHARM REV CODE 250: Performed by: NURSE PRACTITIONER

## 2022-03-18 PROCEDURE — 85025 COMPLETE CBC W/AUTO DIFF WBC: CPT | Performed by: NURSE PRACTITIONER

## 2022-03-18 PROCEDURE — 80100016 HC MAINTENANCE HEMODIALYSIS

## 2022-03-18 PROCEDURE — 90935 HEMODIALYSIS ONE EVALUATION: CPT | Mod: ,,, | Performed by: INTERNAL MEDICINE

## 2022-03-18 RX ORDER — TRAMADOL HYDROCHLORIDE 50 MG/1
50 TABLET ORAL EVERY 12 HOURS PRN
Start: 2022-03-18

## 2022-03-18 RX ORDER — METRONIDAZOLE 10 MG/G
GEL TOPICAL 2 TIMES DAILY
Start: 2022-03-18 | End: 2023-03-18

## 2022-03-18 RX ORDER — ISOSORBIDE MONONITRATE 30 MG/1
30 TABLET, EXTENDED RELEASE ORAL DAILY
Qty: 90 TABLET | Refills: 3
Start: 2022-03-18 | End: 2023-03-18

## 2022-03-18 RX ORDER — CLOPIDOGREL BISULFATE 75 MG/1
75 TABLET ORAL DAILY
Qty: 90 TABLET | Refills: 3
Start: 2022-03-18 | End: 2023-03-18

## 2022-03-18 RX ORDER — NAPROXEN SODIUM 220 MG/1
81 TABLET, FILM COATED ORAL DAILY
Qty: 360 TABLET | Refills: 0
Start: 2022-03-18 | End: 2023-03-18

## 2022-03-18 RX ADMIN — TRAMADOL HYDROCHLORIDE 50 MG: 50 TABLET ORAL at 10:03

## 2022-03-18 RX ADMIN — CARVEDILOL 3.12 MG: 3.12 TABLET, FILM COATED ORAL at 12:03

## 2022-03-18 RX ADMIN — ASPIRIN 81 MG CHEWABLE TABLET 81 MG: 81 TABLET CHEWABLE at 12:03

## 2022-03-18 RX ADMIN — PANTOPRAZOLE SODIUM 40 MG: 40 TABLET, DELAYED RELEASE ORAL at 12:03

## 2022-03-18 RX ADMIN — ISOSORBIDE MONONITRATE 30 MG: 30 TABLET, EXTENDED RELEASE ORAL at 12:03

## 2022-03-18 RX ADMIN — CLOPIDOGREL 75 MG: 75 TABLET, FILM COATED ORAL at 12:03

## 2022-03-18 NOTE — NURSING
Duration: 3 hours    Stable/unstable:stable    Ultrafiltration volume: 3 Liters     Notes: pt tolerated tx well. No vs issues throughout. Only c/o was of hip/back pain for which pt was medicated with tramadol 50 mg PO.

## 2022-03-18 NOTE — PLAN OF CARE
Pt oriented x4 VSS  Pt remained afebile throughout this shift  All meds administered per order.  Pt remianed free of falls  Plan of care reviewed. pt verbalizes understanding.  Patient moving/ turning with assist.  Patient normal sinus on monitor  Bed low, side rails up x2, wheels locked, call light in reach.  Pt instructed to call for assistance  Hourly rounding completed.

## 2022-03-18 NOTE — PROGRESS NOTES
[facility] - Tele[facility] ([facility])  Nephrology  Progress Note    Patient Name: Stephanie Wilder  MRN: [id number]  Admission Date: 3/15/2022  Hospital Length of Stay: 2 days  Attending Provider: Robert Marie MD   Primary Care Physician: Garry Vazquez MD  Principal Problem:Encephalopathy, metabolic    Consults  Subjective:     Interval History:     Patient was seen on dialysis.  In bed resting comfortably.  No acute distress noted.  She is dialyzed through a right thigh graft.    Review of patient's allergies indicates:  No Known Allergies  Current Facility-Administered Medications   Medication Frequency    acetaminophen tablet 650 mg Q4H PRN    albuterol-ipratropium 2.5 mg-0.5 mg/3 mL nebulizer solution 3 mL Q6H PRN    albuterol-ipratropium 2.5 mg-0.5 mg/3 mL nebulizer solution 3 mL Q6H    aluminum-magnesium hydroxide-simethicone 200-200-20 mg/5 mL suspension 30 mL QID PRN    aspirin chewable tablet 81 mg Daily    budesonide nebulizer solution 0.5 mg Q12H    carvediloL tablet 3.125 mg BID WM    clopidogreL tablet 75 mg Daily    dextrose 10% bolus 125 mL PRN    dextrose 10% bolus 250 mL PRN    docusate sodium capsule 100 mg Daily    gabapentin capsule 100 mg QHS    glucagon (human recombinant) injection 1 mg PRN    glucose chewable tablet 16 g PRN    glucose chewable tablet 24 g PRN    hydrALAZINE injection 10 mg Q6H PRN    insulin aspart U-100 pen 0-5 Units QID (AC + HS) PRN    isosorbide mononitrate 24 hr tablet 30 mg Daily    magnesium oxide tablet 800 mg PRN    magnesium oxide tablet 800 mg PRN    melatonin tablet 6 mg Nightly PRN    metronidazole 1% gel BID    miconazole nitrate 2% ointment BID    mupirocin 2 % ointment BID    naloxone 0.4 mg/mL injection 0.02 mg PRN    ondansetron injection 4 mg Q6H PRN    pantoprazole EC tablet 40 mg BID    polyethylene glycol packet 17 g Daily    potassium bicarbonate disintegrating tablet 35 mEq PRN    potassium bicarbonate disintegrating tablet 50 mEq  PRN    potassium bicarbonate disintegrating tablet 60 mEq PRN    potassium, sodium phosphates 280-160-250 mg packet 2 packet PRN    potassium, sodium phosphates 280-160-250 mg packet 2 packet PRN    potassium, sodium phosphates 280-160-250 mg packet 2 packet PRN    prochlorperazine injection Soln 5 mg Q6H PRN    simethicone chewable tablet 80 mg QID PRN    sodium chloride 0.9% flush 10 mL Q8H PRN    traMADoL tablet 50 mg Q12H PRN       Objective:     Vital Signs (Most Recent):  Temp: 98 °F (36.7 °C) (03/18/22 0404)  Pulse: 77 (03/18/22 0404)  Resp: 18 (03/18/22 1005)  BP: (!) 125/58 (03/18/22 0404)  SpO2: (!) 94 % (03/18/22 0404)  O2 Device (Oxygen Therapy): nasal cannula (03/17/22 1955) Vital Signs (24h Range):  Temp:  [97.2 °F (36.2 °C)-98.7 °F (37.1 °C)] 98 °F (36.7 °C)  Pulse:  [68-80] 77  Resp:  [16-18] 18  SpO2:  [93 %-98 %] 94 %  BP: (120-138)/(58-66) 125/58     Weight: 75.5 kg (166 lb 7.2 oz) (03/18/22 0404)  Body mass index is 25.31 kg/m².  Body surface area is 1.9 meters squared.    I/O last 3 completed shifts:  In: 814.9 [P.O.:236; I.V.:557.8; IV Piggyback:21.1]  Out: -     Physical Exam    Significant Labs:sure  BMP:   Recent Labs   Lab 03/15/22  1304 03/16/22  0539 03/18/22  0459   *   < > 107      < > 135*   K 4.7   < > 4.5      < > 99   CO2 22*   < > 22*   BUN 30*   < > 47*   CREATININE 5.6*   < > 7.2*   CALCIUM 8.5*   < > 8.0*   MG 2.1  --   --     < > = values in this interval not displayed.     CMP:   Recent Labs   Lab 03/18/22  0459      CALCIUM 8.0*   ALBUMIN 2.9*   PROT 7.1   *   K 4.5   CO2 22*   CL 99   BUN 47*   CREATININE 7.2*   ALKPHOS 143*   *   *   BILITOT 0.6     All labs within the past 24 hours have been reviewed.    Significant Imaging:  Labs: Reviewed  Reviewed    Assessment/Plan:     Active Diagnoses:    Diagnosis Date Noted POA    Thrombocytopenia [D69.6] 03/17/2022 Yes    NSTEMI (non-ST elevated myocardial infarction) [I21.4]  03/15/2022 Unknown    Diastolic CHF, acute on chronic [I50.33] 03/15/2022 Yes    Acute hypoxemic respiratory failure [J96.01] 03/15/2022 Yes    Elevated LFTs [R79.89] 03/15/2022 Yes    Renovascular hypertension [I15.0] 06/16/2020 Yes    CRLD (chronic restrictive lung disease) [J98.4] 08/28/2018 Yes    DM (diabetes mellitus) [E11.9] 04/04/2015 Yes    End stage renal disease [N18.6] 04/04/2015 Yes      Problems Resolved During this Admission:    Diagnosis Date Noted Date Resolved POA    PRINCIPAL PROBLEM:  Encephalopathy, metabolic [G93.41] 03/15/2022 03/17/2022 Yes    Acute cystitis without hematuria [N30.00] 03/16/2022 03/17/2022 Yes    Hypoglycemia [E16.2] 04/04/2015 03/17/2022 Yes       1. End-stage renal disease:  Seen on dialysis.  No acute distress.  Will plan to continue Monday Wednesday Friday schedule unless otherwise indicated.    Thank you for your consult.     Louie Lockhart MD  Nephrology  [facility] - Telemetry ([facility])

## 2022-03-18 NOTE — PLAN OF CARE
Telemetry monitor removed and returned to monitor tech. PIVs removed and dressing applied. Report called to Ally at [facility] including discharge medications, follow-up appointments and wound care. Awaiting wheelchair van to transport to [facility].

## 2022-03-18 NOTE — PLAN OF CARE
Patient transferring back to [facility].  Discharge orders, mar, avs sent via [facility].       03/18/22 1401   Post-Acute Status   Post-Acute Authorization Placement   Post-Acute Placement Status Set-up Complete/Auth obtained   Discharge Plan   Discharge Plan A Return to nursing home

## 2022-03-19 NOTE — DISCHARGE SUMMARY
[facility] - Telemetry ([facility])  [facility] Medicine  Discharge Summary      Patient Name: Stephanie Wilder  MRN: [id number]  Patient Class: IP- Inpatient  Admission Date: 3/15/2022  Hospital Length of Stay: 2 days  Discharge Date and Time: 3/18/2022  5:41 PM  Attending Physician: Robert Marie MD  Discharging Provider: Zara Bahena NP  Primary Care Provider: Garry Vazquez MD      HPI:    The patient is a 82 yo female with DM, HTN, ESRD on HD, GERD, Bipolar d/o, Breast cancer, Tuberculosis, mild cognitive impairment, Right eye blindness, and hx MRSA bacteremia with mitral vegetation -source thought to be right thigh graft but pt refused removal who presented to ED after being found at [facility] unresponsive, hypoxemic, and hypoglycemic with CBG of 40.  At her nursing home, [facility] placed a non-rebreather mask and gave 2 mg glucagon IM. [facility] then weaned O2 to a nasal cannula and administered 1 amp D5W.  On arrival to ED, pt was on 2 liters NC, CT head showed nothing acute. CXR showed pulmonary edema. EKG showed NSR with 1 st degree AV block, RBB, nonspecific changes. Troponin 5.1. BNP 4295. WBC 13, Mildy elevated LFTs, INR 1.4, +UTI. Glucose 143.    Pt is drowsy, disoriented x 3, arouses to tactile stimulation, and only mumbles incomprehensible words.   Nephrology and cardiology consulted      * No surgery found *      Hospital Course:   82 yo female with DM, HTN, ESRD on HD, GERD, Bipolar d/o, Breast cancer, Tuberculosis, mild cognitive impairment, Right eye blindness, and hx MRSA bacteremia with mitral vegetation -source thought to be right thigh graft but pt refused removal who was admitted for AMS, NSTEMI, Hypoglycemia, UTI, Acute hypoxic respiratory failure 2/2 pulmonary edema, and elevated LFTs. She was placed on IV heparin, ASA, BB. Blood sugars improved on arrival. She was placed on D5W at 25ml/hr. Blood sugar stabilized. Nephrology was consulted and pt received urgent HD. Cardiology was consulted    3/16/22:  Pt remains drowsy but is easily arousable to AAOx3 status. Oxygen status improved after UF with HD. Oxygen weaned from 4 liters NC to 2 liters NC. Blood sugars improved but remain low 100s on D5W.   Troponin 5.1>9.7>13.1>13.7. Echo showed normal LVEF, Diastolic dysfunction, No WMAs. Cardiology recommended medical management for now. Cont IV Heparin, BB, ASA.   Afebrile, WBC trended down to normal. Blood cultures show NGTD, urine culture pending.     3/17/22: AAOx3, Back to baseline. Hypoglycemia resolved-D5W discontinued. Oxygenation stable. No chest pain. Urine culture grew multiple organisms- none in predominance. IV Rocephin d/c'd.   Cardiology recommended d/c IV Heparin, add Plavix and Imdur. They also recommended Heme/Onc consult due to thrombocytopenia and need for Plavix. Heme/Onc recommended platelet blue top which showed platelets 81 and peripheral smear. Iron studies, folate pending. Abdominal u/s ordered 2/2 thrombocytopenia and elevated LFTs- r/o Cirrhosis and splenomegaly. Hepatitis panel still pending.   Possible discharge tomorrow after HD to nursing home if heme/onc w/u stable.     3/18/22 - Spoke with Dr. To and he stated platelet count of 92 K adequate for ASA and Plavix dosing. Pt was advised to follow up with [facility] to further evaluate hepatomegaly, transaminitis and thrombocytopenia. Also, pt advised to follow up with PCP and Dr. Hector, pt's cardiologist. She was seen and examined and determined to be stable for discharge. Her usual home meds were resumed. She was discharged to return to [facility].        Goals of Care Treatment Preferences:  Code Status: Full Code    Health care agent: Pt's daughter and Joe SCHMITZ  Health care agent number: No value filed.                   Consults:   Consults (From admission, onward)        Status Ordering Provider     Inpatient consult to Social Work  Once        Provider:  (Not yet assigned)    Completed MURIEL  KEITH DUMONT     Inpatient consult to Hematology/Oncology  Once        Provider:  Payam To MD    Completed JENNIE NOEL     Inpatient consult to Registered Dietitian/Nutritionist  Once        Provider:  (Not yet assigned)    Completed KEITH LLAMAS     Inpatient consult to Cardiology  Once        Provider:  (Not yet assigned)    Completed CALIN HAMEED          No new Assessment & Plan notes have been filed under this hospital service since the last note was generated.  Service: Hospital Medicine    Final Active Diagnoses:    Diagnosis Date Noted POA    Thrombocytopenia [D69.6] 03/17/2022 Yes    NSTEMI (non-ST elevated myocardial infarction) [I21.4] 03/15/2022 Unknown    Diastolic CHF, acute on chronic [I50.33] 03/15/2022 Yes    Acute hypoxemic respiratory failure [J96.01] 03/15/2022 Yes    Elevated LFTs [R79.89] 03/15/2022 Yes    Renovascular hypertension [I15.0] 06/16/2020 Yes    CRLD (chronic restrictive lung disease) [J98.4] 08/28/2018 Yes    DM (diabetes mellitus) [E11.9] 04/04/2015 Yes    End stage renal disease [N18.6] 04/04/2015 Yes      Problems Resolved During this Admission:    Diagnosis Date Noted Date Resolved POA    PRINCIPAL PROBLEM:  Encephalopathy, metabolic [G93.41] 03/15/2022 03/17/2022 Yes    Acute cystitis without hematuria [N30.00] 03/16/2022 03/17/2022 Yes    Hypoglycemia [E16.2] 04/04/2015 03/17/2022 Yes       Discharged Condition: stable    Disposition: Long Term Care    Follow Up:   Follow-up Information     Garry Vazquez MD Follow up.    Specialty: Internal Medicine  Why: NSTEMI, thrombocytopenia, elevated liver enzymes  Contact information:  [address]  [address]  [facility]  [place] [address] [phone]             [facility] Follow up.    Why: regarding hepatomegaly, elevated liver enzymes and thrombocytopenia           Nigel Hector Md, MD Follow up.    Specialties: Cardiology, Internal Medicine  Why: non ST elevation  myocardial infarction taking aspirin and plavix  Contact information:  [address]  [place] [address]  [phone]                       Patient Instructions:      Ambulatory referral/consult to Gastroenterology   Standing Status: Future   Referral Priority: Routine Referral Type: Consultation   Referral Reason: Specialty Services Required   Requested Specialty: Gastroenterology   Number of Visits Requested: 1       Significant Diagnostic Studies: Labs:   CMP   Recent Labs   Lab 03/18/22 0459   *   K 4.5   CL 99   CO2 22*      BUN 47*   CREATININE 7.2*   CALCIUM 8.0*   PROT 7.1   ALBUMIN 2.9*   BILITOT 0.6   ALKPHOS 143*   *   *   ANIONGAP 14   ESTGFRAFRICA 6*   EGFRNONAA 5*    and CBC   Recent Labs   Lab 03/18/22 0459   WBC 4.78   HGB 9.2*   HCT 30.2*   PLT 92*       Pending Diagnostic Studies:     Procedure Component Value Units Date/Time    APTT [[id number]] Collected: 03/17/22 0347    Order Status: Sent Lab Status: In process Updated: 03/17/22 0347    Specimen: Blood     TSH [[id number]] Collected: 03/15/22 1854    Order Status: Sent Lab Status: In process Updated: 03/15/22 1855    Specimen: Blood          Medications:  Reconciled Home Medications:      Medication List      START taking these medications    aspirin 81 MG Chew  Take 1 tablet (81 mg total) by mouth once daily.     clopidogreL 75 mg tablet  Commonly known as: PLAVIX  Take 1 tablet (75 mg total) by mouth once daily.     isosorbide mononitrate 30 MG 24 hr tablet  Commonly known as: IMDUR  Take 1 tablet (30 mg total) by mouth once daily.     metronidazole 1% 1 % Gel  Commonly known as: METROGEL  Apply topically 2 (two) times a day.     miconazole nitrate 2% 2 % Oint  Commonly known as: MICOTIN  Apply topically 2 (two) times daily.        CHANGE how you take these medications    carvediloL 25 MG tablet  Commonly known as: COREG  Take 1 tablet (25 mg total) by mouth 2 (two) times daily with meals.  What changed:  additional instructions        CONTINUE taking these medications    amLODIPine 10 MG tablet  Commonly known as: NORVASC  Take 1 tablet (10 mg total) by mouth once daily.     ARTIFICIAL TEARS(PVALCH-POVID) 0.5-0.6 % Drop  Generic drug: polyvinyl alcohol-povidone  Place 1 drop into both eyes every 12 (twelve) hours as needed (dry eyes).     AURYXIA ORAL  Take 1 tablet by mouth 2 (two) times daily with meals.     calcium carbonate 500 mg calcium (1,250 mg) chewable tablet  Commonly known as: OS-APARNA  Take 2 tablets by mouth once daily.     cetirizine 10 MG tablet  Commonly known as: ZYRTEC  Take 10 mg by mouth once daily.     cholecalciferol (vitamin D3) 25 mcg (1,000 unit) capsule  Commonly known as: VITAMIN D3  Take 1 capsule by mouth once daily.     cloNIDine 0.1 MG tablet  Commonly known as: CATAPRES  Take 0.1 mg by mouth every 8 (eight) hours as needed (hypertension).     docusate sodium 100 MG capsule  Commonly known as: COLACE  Take 100 mg by mouth once daily. (on Tuesday, Thursday, Saturday, & Sunday)     ethyl chloride 100% 100 % spray  Apply topically as needed (to access cannulation once daily on Monday, Wednesday, & Friday).     gabapentin 100 MG capsule  Commonly known as: NEURONTIN  Take 100 mg by mouth every evening.     hydrocortisone 2.5 % rectal cream  Commonly known as: ANUSOL-HC  Apply to hemorrhoids every 12 hours     hydrOXYzine pamoate 25 MG Cap  Commonly known as: VISTARIL  Take 25 mg by mouth every 6 (six) hours as needed (allergies).     insulin aspart U-100 100 unit/mL injection  Commonly known as: NovoLOG  Inject into the skin 3 (three) times daily before meals. using sliding scale     melatonin 5 mg Cap  Take 1 capsule by mouth nightly.     mirtazapine 15 MG disintegrating tablet  Commonly known as: REMERON SOL-TAB  Take 15 mg by mouth every evening.     pantoprazole 40 MG tablet  Commonly known as: PROTONIX  Take 1 tablet (40 mg total) by mouth 2 (two) times daily.     polyethylene glycol  17 gram Pwpk  Commonly known as: GLYCOLAX  Take 17 g by mouth once daily.     pravastatin 40 MG tablet  Commonly known as: PRAVACHOL  Take 1 tablet (40 mg total) by mouth once daily.     psyllium powder  Commonly known as: METAMUCIL  Take 1 packet by mouth once daily.     traMADoL 50 mg tablet  Commonly known as: ULTRAM  Take 1 tablet (50 mg total) by mouth every 12 (twelve) hours as needed for Pain.        STOP taking these medications    acetaminophen 325 MG tablet  Commonly known as: TYLENOL     benzonatate 100 MG capsule  Commonly known as: TESSALON     doxycycline 100 MG Cap  Commonly known as: VIBRAMYCIN            Indwelling Lines/Drains at time of discharge:   Lines/Drains/Airways     Central Venous Catheter Line  Duration                Hemodialysis AV Graft Right thigh -- days          Drain  Duration                Hemodialysis AV Fistula Right thigh -- days                Time spent on the discharge of patient: > 30 minutes         Zara Bahena NP  Department of Hospital Medicine  [facility] - Telemetry ([facility])

## 2022-03-21 ENCOUNTER — TELEPHONE (OUTPATIENT)
Dept: CARDIOLOGY | Facility: CLINIC | Age: 82
End: 2022-03-21
Payer: MEDICARE

## 2022-03-21 LAB
BACTERIA BLD CULT: NORMAL
BACTERIA BLD CULT: NORMAL

## 2022-03-21 NOTE — TELEPHONE ENCOUNTER
Patient contacted and spoke to the scheduling dept. The patient is scheduled to see Dr. Hector on 03/31/2022 due to transportation. Labs were requested to be done tomorrow and fax to us.to [phone]    The message center stated understanding with no questions or concerns

## 2022-03-23 ENCOUNTER — TELEPHONE (OUTPATIENT)
Dept: CARDIOLOGY | Facility: HOSPITAL | Age: 82
End: 2022-03-23
Payer: MEDICARE

## 2022-03-23 ENCOUNTER — TELEPHONE (OUTPATIENT)
Dept: CARDIOLOGY | Facility: CLINIC | Age: 82
End: 2022-03-23
Payer: MEDICARE

## 2022-03-23 NOTE — TELEPHONE ENCOUNTER
Called patient per Kourtney Morales     Please make sure CBC faxed to us and type in note for me to review.     Thanks     Spoke to patient, seems confused on if she did labs states she haven't had labs drawn since being in the hospital two weeks ago. I'm unsure on where she would have had them done at.

## 2022-03-23 NOTE — TELEPHONE ENCOUNTER
Called [facility]. Spoke to the patient's nurse. She said they did blood work on her yesterday and is getting ready to fax over the results now.       Per Kourtney Morales    Please make sure CBC faxed to us and type in note for me to review.     Thanks

## 2022-03-25 ENCOUNTER — DOCUMENTATION ONLY (OUTPATIENT)
Dept: CARDIOLOGY | Facility: HOSPITAL | Age: 82
End: 2022-03-25
Payer: MEDICARE

## 2022-03-25 NOTE — PROGRESS NOTES
Patient recently admitted with NSTEMI. Medically managed due to co-morbidities (debility, history of anemia, thrombocytopenia, advanced age, memory issues). EF normal by echo.    Reviewed recent CBC due to thrombocytopenia/anemia noted during hospital stay---all values stable.     Will have patient re-evaluated closely in CV clinic next week.     Hematology consulted during hospitalization due to need for DAPT therapy.

## 2022-03-31 ENCOUNTER — OFFICE VISIT (OUTPATIENT)
Dept: CARDIOLOGY | Facility: CLINIC | Age: 82
End: 2022-03-31
Payer: MEDICARE

## 2022-03-31 VITALS
HEART RATE: 72 BPM | OXYGEN SATURATION: 98 % | SYSTOLIC BLOOD PRESSURE: 144 MMHG | BODY MASS INDEX: 25.24 KG/M2 | WEIGHT: 166 LBS | DIASTOLIC BLOOD PRESSURE: 66 MMHG

## 2022-03-31 DIAGNOSIS — I73.9 PVD (PERIPHERAL VASCULAR DISEASE): ICD-10-CM

## 2022-03-31 DIAGNOSIS — N18.6 PULMONARY HYPERTENSION ASSOCIATED WITH END-STAGE RENAL DISEASE (ESRD) ON DIALYSIS: ICD-10-CM

## 2022-03-31 DIAGNOSIS — I87.2 VENOUS INSUFFICIENCY OF BOTH LOWER EXTREMITIES: ICD-10-CM

## 2022-03-31 DIAGNOSIS — N18.6 ESRD ON DIALYSIS: ICD-10-CM

## 2022-03-31 DIAGNOSIS — I21.4 NSTEMI (NON-ST ELEVATED MYOCARDIAL INFARCTION): Primary | ICD-10-CM

## 2022-03-31 DIAGNOSIS — I27.29 PULMONARY HYPERTENSION ASSOCIATED WITH END-STAGE RENAL DISEASE (ESRD) ON DIALYSIS: ICD-10-CM

## 2022-03-31 DIAGNOSIS — Z99.2 PULMONARY HYPERTENSION ASSOCIATED WITH END-STAGE RENAL DISEASE (ESRD) ON DIALYSIS: ICD-10-CM

## 2022-03-31 DIAGNOSIS — I15.0 RENOVASCULAR HYPERTENSION: ICD-10-CM

## 2022-03-31 DIAGNOSIS — Z99.2 ESRD ON DIALYSIS: ICD-10-CM

## 2022-03-31 DIAGNOSIS — R60.0 LOCALIZED EDEMA: ICD-10-CM

## 2022-03-31 DIAGNOSIS — I50.1 PULMONARY EDEMA WITH CONGESTIVE HEART FAILURE: ICD-10-CM

## 2022-03-31 DIAGNOSIS — I95.3 HEMODIALYSIS-ASSOCIATED HYPOTENSION: ICD-10-CM

## 2022-03-31 PROCEDURE — 99999 PR PBB SHADOW E&M-EST. PATIENT-LVL IV: ICD-10-PCS | Mod: PBBFAC,,, | Performed by: INTERNAL MEDICINE

## 2022-03-31 PROCEDURE — 99214 PR OFFICE/OUTPT VISIT, EST, LEVL IV, 30-39 MIN: ICD-10-PCS | Mod: S$PBB,,, | Performed by: INTERNAL MEDICINE

## 2022-03-31 PROCEDURE — 99214 OFFICE O/P EST MOD 30 MIN: CPT | Mod: S$PBB,,, | Performed by: INTERNAL MEDICINE

## 2022-03-31 PROCEDURE — 99214 OFFICE O/P EST MOD 30 MIN: CPT | Mod: PBBFAC | Performed by: INTERNAL MEDICINE

## 2022-03-31 PROCEDURE — 99999 PR PBB SHADOW E&M-EST. PATIENT-LVL IV: CPT | Mod: PBBFAC,,, | Performed by: INTERNAL MEDICINE

## 2022-03-31 NOTE — PROGRESS NOTES
Subjective:   Patient ID:  Stephanie Wilder is a 82 y.o. female who presents for cardiac consult of No chief complaint on file.      HPI  The patient came in today for cardiac consult of No chief complaint on file.    Referring Provider: Michael Cervantes  Reason for initial consult: Leg swelling    Stephanie Wilder is a 82 y.o. female pt with current medical conditions HTN, PVD, venous insufficiency, ESRD on HD M, W,F, DM, neuropathy, anemia, dementia, gerd presents for follow up CV evaluation.     6/29/18  Pt has been having presyncopal feelings during HD. She has been having LE edema x 2 months, gradually getting worse. No SOB, but cannot lay flat, somewhat describes orthopnea. No chest pain. No palpitations. Has been on HD for 13 years. Can walk to bathroom and back. Uses wheelchair, can't walk well overall due to weakness in legs. PT has syncopal episode 12 years ago since episode she couldn't walk, no stroke or other etiology found for lack of walking.     10/23/18  Pt had 2D ECHO with normal Bi V function, Pulm HTN, mild TR. She has LE cellulitis on PO abx, has not cleared for about 2-3 months now, no fevers/chills. Compression stockings have been helpful.   ECG - NSR, RBBB, 1st deg AVB, septal infarct - old    4/9/19  LE SMITHA/venous/arterial Dopplers not completed. BP elevated today but usually very low on HD days. Overall feels well. No syncope/palpitations. Right leg with swelling more with compression stockings on. Discussed needs ultrasounds of legs soon.     7/11/19  Mild PAD noted in recent SMITHA. U/S pending. Started on asa and statin. Has not gotten LE u/s yet. Will increase Hydralazine to 100 tid due to elevated Bp but needs to monitor closely at home.     6/16/20  LE us after last vsit with diffuse disease b/l, mild to moderate PAD. Pt had eval with Kourtney - Discussed SMITHA/arterial U/S with Dr. Reina. Needs to f/u with Dr. Burnett, vasc surgeon.  She also had bleeding from HD fistula went to ER last week, BP  was elevated 211/91. She will see Dr. Boyd soon.   She lives in Nursing home, does not have COVID but brothers do and one of them . BP has improved. She had a fall recently while getting up to fridge, another time out of bed.     3/17/22  Stephanie Wilder is a 81 y.o. female with current medical conditions including DM, HTN, ESRD on HD, GERD, breast cancer, TB, cognitive impairment, hx of MRSA bacteremia with mitral vegetation who presented to the ED 1 day ago after being found unresponsive at [facility]. Per EMS report patient was hypoxemic and hypoglycemic with BS in the 40s. Upon arrival to the ED the patients CT head negative for acute abnormalities, CXR with pulmonary edema. EKG with SR, 1st AV block, RBBB, T wave inversions anteriorly. Cardiology consulted, patient seen and examined. Remains lethargic and confused, unresponsive to verbal stimuli. Would occasionally mumble incomprehensible words. Labs reviewed, troponin 5.1 >> 5.6, BNP 4245, WBC 9.77>>13.54, Cr 5.6 >> 4.9, AST//237 and found to have a UTI on UA.  Echo pending. Would recommend continuing conservative cardio management for now given her AMS.     Hospital Course:   3/17/22-Patient seen and examined today, sitting up in bed. Much more awake and alert. States she feels well. No CV complaints. Labs reviewed, platelets have been persistently low, hematology consulted. Troponin trending down. Echo showed normal EF, no WMA noted. Discussed med management vs invasive ischemic workup with patient (she wants medical management). Labs reviewed.     Patient who presents with AMS/encephalopathy, hypoglycemia, respiratory failure, NSTEMI. Much improved, back to baseline. No CV complaints. Remains chest pain free. Echo reviewed, showed normal EF, no WMA. Discussed med management vs invasive workup (LHC, stress test etc). Patient has opted for medical management (no family at bedside during exam, discussed with hospital medicine).    3/31/22  Recent  hosp follow up for AMS, low blood surgar, NSTEMI trop inc to 5.6, medically managed. She feels well now, no CP/SOB. Is taking asa and plavix. ECHO with normal bi V function.     Patient feels no chest pain, no sob,  no PND, no palpitation, no syncope, no CNS symptoms.    Patient is compliant with medications.    Results for orders placed during the hospital encounter of 03/15/22    Echo    Interpretation Summary  · Concentric hypertrophy and normal systolic function.  · The estimated ejection fraction is 60%.  · Grade II left ventricular diastolic dysfunction.  · With normal right ventricular systolic function.  · Mild to moderate tricuspid regurgitation.  · There is pulmonary hypertension.      8/2/19 [facility]  Patient has Rt prox/mid thigh dialysis graft.     COMPLICATIONS   RLE diffuse monophasic waveform suggesting inflow disease;   LLE: occluded distal PTA with diffuse monophasic waveform.   50 to 99% lesios in bilateral CFA, SFA, popliteal and tibioperoneal trunk arteries.   RLE Diaslysis graft petent.     RLE SMITHA 0.8, mild lower extremity arterial disease.   LLE SMITHA 0.97, minimal disease    This document has been electronically    SIGNED BY: Isaiah Rolon MD On: 08/02/2019 19:06     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       Past Surgical History:   Procedure Laterality Date    CATARACT EXTRACTION      MASTECTOMY Right        Social History     Tobacco Use    Smoking status: Never Smoker    Smokeless tobacco: Never Used   Substance Use Topics    Alcohol use: No    Drug use: No       Family History   Problem Relation Age of Onset    Diabetes Maternal Aunt     Glaucoma Maternal Aunt     Heart  attack Maternal Aunt 80    Diabetes Paternal Aunt     Glaucoma Paternal Aunt     Diabetes Child     Heart attack Brother 79       Patient's Medications   New Prescriptions    No medications on file   Previous Medications    AMLODIPINE (NORVASC) 10 MG TABLET    Take 1 tablet (10 mg total) by mouth once daily.    ASPIRIN 81 MG CHEW    Take 1 tablet (81 mg total) by mouth once daily.    CALCIUM CARBONATE (OS-APARNA) 500 MG CALCIUM (1,250 MG) CHEWABLE TABLET    Take 2 tablets by mouth once daily.    CARVEDILOL (COREG) 25 MG TABLET    Take 1 tablet (25 mg total) by mouth 2 (two) times daily with meals.    CETIRIZINE (ZYRTEC) 10 MG TABLET    Take 10 mg by mouth once daily.    CHOLECALCIFEROL, VITAMIN D3, (VITAMIN D3) 25 MCG (1,000 UNIT) CAPSULE    Take 1 capsule by mouth once daily.    CLONIDINE (CATAPRES) 0.1 MG TABLET    Take 0.1 mg by mouth every 8 (eight) hours as needed (hypertension).    CLOPIDOGREL (PLAVIX) 75 MG TABLET    Take 1 tablet (75 mg total) by mouth once daily.    DOCUSATE SODIUM (COLACE) 100 MG CAPSULE    Take 100 mg by mouth once daily. (on Tuesday, Thursday, Saturday, & Sunday)    ETHYL CHLORIDE 100% 100 % SPRAY    Apply topically as needed (to access cannulation once daily on Monday, Wednesday, & Friday).    FERRIC CITRATE (AURYXIA ORAL)    Take 1 tablet by mouth 2 (two) times daily with meals.    GABAPENTIN (NEURONTIN) 100 MG CAPSULE    Take 100 mg by mouth every evening.    HYDROCORTISONE (ANUSOL-HC) 2.5 % RECTAL CREAM    Apply to hemorrhoids every 12 hours    HYDROXYZINE PAMOATE (VISTARIL) 25 MG CAP    Take 25 mg by mouth every 6 (six) hours as needed (allergies).    INSULIN ASPART U-100 (NOVOLOG) 100 UNIT/ML INJECTION    Inject into the skin 3 (three) times daily before meals. using sliding scale    ISOSORBIDE MONONITRATE (IMDUR) 30 MG 24 HR TABLET    Take 1 tablet (30 mg total) by mouth once daily.    MELATONIN 5 MG CAP    Take 1 capsule by mouth nightly.    METRONIDAZOLE 1% (METROGEL) 1 % GEL     Apply topically 2 (two) times a day.    MICONAZOLE NITRATE 2% (MICOTIN) 2 % OINT    Apply topically 2 (two) times daily.    MIRTAZAPINE (REMERON SOL-TAB) 15 MG DISINTEGRATING TABLET    Take 15 mg by mouth every evening.    PANTOPRAZOLE (PROTONIX) 40 MG TABLET    Take 1 tablet (40 mg total) by mouth 2 (two) times daily.    POLYETHYLENE GLYCOL (GLYCOLAX) 17 GRAM PWPK    Take 17 g by mouth once daily.    POLYVINYL ALCOHOL-POVIDONE (ARTIFICIAL TEARS,PVALCH-POVID,) 0.5-0.6 % DROP    Place 1 drop into both eyes every 12 (twelve) hours as needed (dry eyes).    PRAVASTATIN (PRAVACHOL) 40 MG TABLET    Take 1 tablet (40 mg total) by mouth once daily.    PSYLLIUM (METAMUCIL) POWDER    Take 1 packet by mouth once daily.    TRAMADOL (ULTRAM) 50 MG TABLET    Take 1 tablet (50 mg total) by mouth every 12 (twelve) hours as needed for Pain.   Modified Medications    No medications on file   Discontinued Medications    No medications on file       Review of Systems   Constitutional: Negative.    HENT: Negative.    Eyes: Negative.    Respiratory: Negative.    Cardiovascular: Positive for leg swelling.   Gastrointestinal: Negative.    Genitourinary: Negative.    Musculoskeletal: Negative.    Skin: Negative.    Neurological: Positive for focal weakness.   Endo/Heme/Allergies: Negative.    Psychiatric/Behavioral: Negative.    All 12 systems otherwise negative.      Wt Readings from Last 3 Encounters:   03/31/22 75.3 kg (166 lb)   03/18/22 75.5 kg (166 lb 7.2 oz)   06/16/20 75.8 kg (167 lb)     Temp Readings from Last 3 Encounters:   03/18/22 98.3 °F (36.8 °C) (Oral)   06/10/20 98.4 °F (36.9 °C) (Oral)   03/30/20 99.2 °F (37.3 °C) (Oral)     BP Readings from Last 3 Encounters:   03/31/22 (!) 144/66   03/18/22 (!) 116/55   06/16/20 (!) 140/70     Pulse Readings from Last 3 Encounters:   03/31/22 72   03/18/22 78   06/16/20 70       BP (!) 144/66 (BP Location: Right arm, Patient Position: Sitting, BP Method: Medium (Manual))   Pulse 72    Wt 75.3 kg (166 lb)   SpO2 98%   BMI 25.24 kg/m²     Objective:   Physical Exam  Vitals and nursing note reviewed.   Constitutional:       General: She is not in acute distress.     Appearance: She is well-developed. She is not diaphoretic.   HENT:      Head: Normocephalic and atraumatic.      Nose: Nose normal.   Eyes:      General: No scleral icterus.     Conjunctiva/sclera: Conjunctivae normal.   Neck:      Thyroid: No thyromegaly.      Vascular: No JVD.   Cardiovascular:      Rate and Rhythm: Normal rate and regular rhythm.      Heart sounds: S1 normal and S2 normal. Murmur heard.    Midsystolic murmur is present with a grade of 2/6 at the upper right sternal border.    No friction rub. No gallop. No S3 or S4 sounds.   Pulmonary:      Effort: Pulmonary effort is normal. No respiratory distress.      Breath sounds: Normal breath sounds. No stridor. No wheezing or rales.   Chest:      Chest wall: No tenderness.   Abdominal:      General: Bowel sounds are normal. There is no distension.      Palpations: Abdomen is soft. There is no mass.      Tenderness: There is no abdominal tenderness. There is no rebound.   Genitourinary:     Comments: Deferred  Musculoskeletal:         General: No tenderness or deformity. Normal range of motion.      Cervical back: Normal range of motion and neck supple.   Lymphadenopathy:      Cervical: No cervical adenopathy.   Skin:     General: Skin is warm and dry.      Coloration: Skin is not pale.      Findings: Rash (lipodermatosclerosis on B/L LE) present. No erythema.   Neurological:      Mental Status: She is alert and oriented to person, place, and time.      Motor: No abnormal muscle tone.      Coordination: Coordination normal.   Psychiatric:         Behavior: Behavior normal.         Thought Content: Thought content normal.         Judgment: Judgment normal.         Lab Results   Component Value Date     (L) 03/18/2022    K 4.5 03/18/2022    CL 99 03/18/2022    CO2 22 (L)  03/18/2022    BUN 47 (H) 03/18/2022    CREATININE 7.2 (H) 03/18/2022     03/18/2022    HGBA1C 5.4 07/13/2021    HGBA1C 6.2 02/27/2016    MG 2.1 03/15/2022     (H) 03/18/2022     (H) 03/18/2022    ALBUMIN 2.9 (L) 03/18/2022    PROT 7.1 03/18/2022    BILITOT 0.6 03/18/2022    WBC 4.78 03/18/2022    HGB 9.2 (L) 03/18/2022    HCT 30.2 (L) 03/18/2022    HCT 35 (L) 03/15/2022     (H) 03/18/2022    PLT 92 (L) 03/18/2022    INR 1.1 03/17/2022    TSH <0.010 (L) 04/13/2016    CHOL 86 (L) 03/16/2022    HDL 37 (L) 03/16/2022    LDLCALC 37.6 (L) 03/16/2022    TRIG 57 03/16/2022    BNP 4,245 (H) 03/15/2022     Assessment:      1. NSTEMI (non-ST elevated myocardial infarction)    2. Pulmonary edema with congestive heart failure    3. ESRD on dialysis    4. PVD (peripheral vascular disease)    5. Renovascular hypertension    6. Localized edema    7. Pulmonary hypertension associated with end-stage renal disease (ESRD) on dialysis    8. Venous insufficiency of both lower extremities    9. Hemodialysis-associated hypotension        Plan:   1. LE edema sec Venous insufficiency  - rec compression stockings daily  - low salt diet    2. HTN/hypotension with HD - improved today  - stable, occ low     3. ESRD  - cont HD  - compression stockings will increase venous return to allow more volume removal with HD    4. Pulm HTN  - cont HD and f/u with pulm    5. PVD  - cont asa, statin  - f/u with vasc surgeon    6. H/o Recent NSTEMI   - cont medical management  - ECHO with normal bi V function.   - cont asa, plavix, statin    Thank you for allowing me to participate in this patient's care. Please do not hesitate to contact me with any questions or concerns. Consult note has been forwarded to the referral physician.

## 2022-09-28 ENCOUNTER — TELEPHONE (OUTPATIENT)
Dept: CARDIOLOGY | Facility: CLINIC | Age: 82
End: 2022-09-28

## 2022-09-28 DIAGNOSIS — I73.9 PVD (PERIPHERAL VASCULAR DISEASE): ICD-10-CM

## 2022-09-28 DIAGNOSIS — I50.33 DIASTOLIC CHF, ACUTE ON CHRONIC: Primary | ICD-10-CM
